# Patient Record
Sex: FEMALE | Race: WHITE | NOT HISPANIC OR LATINO | Employment: FULL TIME | URBAN - METROPOLITAN AREA
[De-identification: names, ages, dates, MRNs, and addresses within clinical notes are randomized per-mention and may not be internally consistent; named-entity substitution may affect disease eponyms.]

---

## 2018-09-11 ENCOUNTER — RECORDS - HEALTHEAST (OUTPATIENT)
Dept: LAB | Facility: CLINIC | Age: 48
End: 2018-09-11

## 2018-09-11 LAB
T4 FREE SERPL-MCNC: 0.6 NG/DL (ref 0.7–1.8)
TSH SERPL DL<=0.005 MIU/L-ACNC: 5.88 UIU/ML (ref 0.3–5)

## 2019-07-23 ENCOUNTER — OFFICE VISIT (OUTPATIENT)
Dept: HEMATOLOGY | Facility: CLINIC | Age: 49
End: 2019-07-23
Attending: INTERNAL MEDICINE
Payer: COMMERCIAL

## 2019-07-23 VITALS
HEART RATE: 89 BPM | OXYGEN SATURATION: 98 % | BODY MASS INDEX: 43.19 KG/M2 | RESPIRATION RATE: 12 BRPM | TEMPERATURE: 98.2 F | SYSTOLIC BLOOD PRESSURE: 136 MMHG | WEIGHT: 253 LBS | DIASTOLIC BLOOD PRESSURE: 80 MMHG | HEIGHT: 64 IN

## 2019-07-23 DIAGNOSIS — Z86.718 PERSONAL HISTORY OF VENOUS THROMBOSIS AND EMBOLISM: Primary | ICD-10-CM

## 2019-07-23 DIAGNOSIS — Z79.01 LONG TERM CURRENT USE OF ANTICOAGULANT THERAPY: ICD-10-CM

## 2019-07-23 DIAGNOSIS — D68.51 HETEROZYGOUS FACTOR V LEIDEN MUTATION (H): ICD-10-CM

## 2019-07-23 PROCEDURE — 99204 OFFICE O/P NEW MOD 45 MIN: CPT | Performed by: INTERNAL MEDICINE

## 2019-07-23 PROCEDURE — G0463 HOSPITAL OUTPT CLINIC VISIT: HCPCS

## 2019-07-23 RX ORDER — ALBUTEROL SULFATE 90 UG/1
2 AEROSOL, METERED RESPIRATORY (INHALATION) EVERY 4 HOURS PRN
COMMUNITY
Start: 2019-05-09

## 2019-07-23 RX ORDER — ERGOCALCIFEROL 1.25 MG/1
1 CAPSULE, LIQUID FILLED ORAL
Refills: 1 | COMMUNITY
Start: 2019-07-01

## 2019-07-23 RX ORDER — LAMOTRIGINE 200 MG/1
200 TABLET ORAL DAILY
Refills: 0 | COMMUNITY
Start: 2019-07-01 | End: 2023-01-25

## 2019-07-23 ASSESSMENT — PAIN SCALES - GENERAL: PAINLEVEL: NO PAIN (0)

## 2019-07-23 ASSESSMENT — MIFFLIN-ST. JEOR: SCORE: 1762.84

## 2019-07-23 NOTE — NURSING NOTE
Anna Nieves is here for return visit (last seen in 2014) and is  being seen for her history of clotting.  She had her initial clotting in 1994 and was on warfarin.  In 2014 when off warfarin for a week, she had unprovoked clotting and has been on warfarin since.  She is fine with this drug, but her primary is urging her to come in to discuss DOACs. She also shared that her father has been ill with a blood problem and brought his records to see if this has an impact on her care.    I welcomed and introduced her to our center and provided her with a contact card containing our information.    We reviewed the above history and reason for visit and this was communicated to the provider.  I then reviewed which provider she was going to be seeing today and the expected timing of that provider.    Medications and allergies were reviewed, updated and reconciled with available records.    Educated patient about the signs, symptoms of and risk factors for venous thrombosis (VTE) and provided an educational  book pedro, which reviews these facts. And includes the following web links  for further information:  www.stoptheclot.org, www.clotconnect.org.    I did provide patient with a flyer about a thrombosis educational event, happening on 10/12/2019 and sponsored by our center. The flyer includes a phone number and email for further information or to register their interest.    I  thanked her for coming and encouraged her to call with any concerns or questions.    Marie Barbour, RN - Nurse Clinician - [facility] - [phone]

## 2019-07-23 NOTE — PROGRESS NOTES
[facility]  [address] [place] [address] [address], [place] [address]  Main: [phone], Fax: [phone]        NAME: Anna Nieves    MRN: [id number]    Date of Visit:  07/23/2019             Chief complaint:   Possible switch to DOAC         History of Present Illness:   Anna Nieves is a 49 year old female with history of unprovoked DVT and PE in 2/2014 and heterozygous for factor V Leiden on chronic warfarin therapy who presents today for discussion regarding consideration of switching to a direct oral anticoagulant.     Please refer to the clinic note on 4/8/2014 by Dr. Lee for further details. In summary, the patient had her first thrombotic event in the superior sagittal sinus in 1994 after taking oral estrogen for less than 6 months.  It was felt to be provoked by oral estrogen therapy thus recommended short-term anticoagulation but patient elected to stay with long-term warfarin.  In terms of hypercoagulation work-up, there was a transient lupus anticoagulant that was not demonstrated on repeat testing.  She was found to be heterozygous for factor V Leiden.  When she had been off of her warfarin for a week due to running out of medication, she developed unprovoked left upper and lower extremity DVT with bilateral PE in 2/2014.  Since then she has been on warfarin.  She was seen last in the clinic at that time discussed other oral anticoagulation options particularly warfarin versus rivaroxaban, she decided to stay on warfarin.     She states that she has not had any recurrence of blood clots or bleeding complications since the last episode in 2014.  Although her INRs are not available to us, she reports that her INR has been very stable.  However, occasionally she did not show up for INR check-up with her PCP since her INR has been stable all the time.  Her primary care physician has thus advised her to discuss newer anticoagulation medications with   Jesus.      Last time she was also recommended to have a repeat venous ultrasound 3 months after the last event, which apparently did not happen.  Her father recently developed refractory anemia and is undergoing evaluations including bone marrow biopsy.  She was told it might be either multiple myeloma or NHL, and wonders whether there is any ties between her inherited coagulation tendency and these hematological conditions.         Review of Systems:   ROS: Comprehensive 10 point ROS negative except for that detailed above.         Past Medical History:   No past medical history on file.           Past Surgical History:   No past surgical history on file.         Social History:     Social History     Socioeconomic History     Marital status:      Spouse name: Not on file     Number of children: Not on file     Years of education: Not on file     Highest education level: Not on file   Occupational History     Not on file   Social Needs     Financial resource strain: Not on file     Food insecurity:     Worry: Not on file     Inability: Not on file     Transportation needs:     Medical: Not on file     Non-medical: Not on file   Tobacco Use     Smoking status: Never Smoker   Substance and Sexual Activity     Alcohol use: No     Drug use: Not on file     Sexual activity: Not on file   Lifestyle     Physical activity:     Days per week: Not on file     Minutes per session: Not on file     Stress: Not on file   Relationships     Social connections:     Talks on phone: Not on file     Gets together: Not on file     Attends Scientologist service: Not on file     Active member of club or organization: Not on file     Attends meetings of clubs or organizations: Not on file     Relationship status: Not on file     Intimate partner violence:     Fear of current or ex partner: Not on file     Emotionally abused: Not on file     Physically abused: Not on file     Forced sexual activity: Not on file   Other Topics Concern  "    Parent/sibling w/ CABG, MI or angioplasty before 65F 55M? Not Asked   Social History Narrative     Not on file            Family History:   No family history on file.     Please see Dr. Lee's note from 2014 for details.         Allergies:     Allergies   Allergen Reactions     Sulfa Drugs Hives            Home Medications:     Current Outpatient Medications   Medication     acetaZOLAMIDE (DIAMOX) 250 MG tablet     amphetamine-dextroamphetamine (ADDERALL) 20 MG tablet     cetirizine (ZYRTEC ALLERGY) 10 MG tablet     sertraline (ZOLOFT) 100 MG tablet     thyroid (ARMOUR THYROID) 120 MG tablet     warfarin (COUMADIN) 3 MG tablet     No current facility-administered medications for this visit.             Physical Exam:   /80 (BP Location: Right arm, Patient Position: Sitting, Cuff Size: Adult Large)   Pulse 89   Temp 98.2  F (36.8  C) (Oral)   Resp 12   Ht 1.618 m (5' 3.7\")   Wt 114.8 kg (253 lb)   SpO2 98%   BMI 43.84 kg/m       General:  no acute distress.  Heme/Lymph: No overt bleeding  Skin: No concerning lesions or rash on exposed surfaces.  HEENT: NCAT. anicteric sclera. Oral mucosa pink and moist with no lesions or thrush.  Neck: Neck supple.   Respiratory: Non-labored breathing, good air exchange, lungs clear to auscultation bilaterally.  Cardiovascular: Regular rate and rhythm. No murmur or rub.   Abdomen: Normoactive bowel sounds. Abdomen soft, non-distended, and non-tender. Extremities: grossly normal, non-tender, no edema.   Neurologic: A&O x 3, CNs 2-12 grossly intact, speech normal. Grossly non-focal.   Psychiatric: Mentation and affect appear normal.           Assessment :   1- History of provoked (by oral estrogen therapy for less than 6 months) superior sagittal sinus thrombosis in 1994.   2- Heterozygous for factor V Leiden with family history of venous thrombosis.   3- History of unprovoked left upper and lower extremity deep vein thrombosis with bilateral pulmonary embolism in " 2014  4- Long term anticoagulation with warfarin      The patient has been on long-term anticoagulation with warfarin since 1994 and complicated by unprovoked DVT/PE when warfarin was discontinued for about a week in 2014.  Last time she discussed pros and cons of different anticoagulation options, particularly warfarin versus rivaroxaban.  At that time, she elected to stay with warfarin.     We again discussed the advantages and disadvantages of warfarin versus direct oral anticoagulants.  After consideration, she decided to stay with warfarin.  This is due primarily to her concern about the impact of occasionally missing doses.    We will recommend her to have another ultrasound at the facility where she had previous venous doppler.  The reason to have another doppler is to set a baseline to be compared in case she develops symptoms concerning for DVT in the future.  Approximately 1 out of 3 patients could have chronic non-occlusive thrombosis after initial DVT.    In terms of genetic test for inherited hypercoagulability, there are no new genetic tests available since her last visit. Her father's diagnosis is pending, but none of her father's potential diagnoses likely requires her to have additional genetic tests. Since her family history is strongly positive for DVT we encourage her to update us if there are any family members newly affected by DVT.           Plan:   - Continue warfarin   - Repeat venous doppler in the left lower extremity    Patient was seen, care plan discussed and staffed with Dr. Cortez.     Se lisa Chun MD  [facility]  Hematology oncology and transplantation fellow  Pager [phone]        HEMATOLOGY STAFF:  Seen with fellow, whose note reflects our joint evaluation, assessment, and plan.    Anna is a 49-year-old woman whom we have seen intermittently over many years.  As outlined in my last note from April 2014, she has a history of estrogen provoked superior  sagittal sinus thrombosis in 1994.  Subsequent hypercoagulable testing revealed only heterozygous factor V Leiden.  She has a strong family history of venous thrombosis suggesting the presence of something more than factor V Leiden although no other thrombophilia has been identified.  She elected to stay on long-term anticoagulation even though her initial clotting event was provoked.    In early 2014, after being off warfarin for approximately 1 week, she had an unprovoked episode of venous thromboembolism including left upper and lower extremity DVTs as well as pulmonary emboli.  Thus, she now has a clear indication for long-term anticoagulation.    She is here today to discuss whether she should continue with warfarin or switch to a direct oral anticoagulant.  We reviewed the advantages and disadvantages of both classes of medications.  Her primary concern about the direct oral anticoagulants is the importance of maintaining strict adherence to the every 12 or 24-hour dosing schedules.  For this reason, she feels that warfarin would be a better fit for her and we support this decision.      Would like to see her back annually to review her long-term anticoagulation plan.  She was given our up-to-date contact information and encouraged to call with any new questions or concerns.    Total time 45 minutes, all in counseling and coordination of care.      Pedro Lee MD  Associate Professor of Medicine  Division of Hematology, Oncology, and Transplantation  Director, [facility]

## 2020-11-29 ENCOUNTER — HEALTH MAINTENANCE LETTER (OUTPATIENT)
Age: 50
End: 2020-11-29

## 2021-02-04 ENCOUNTER — RECORDS - HEALTHEAST (OUTPATIENT)
Dept: LAB | Facility: CLINIC | Age: 51
End: 2021-02-04

## 2021-02-04 LAB
T4 FREE SERPL-MCNC: 0.5 NG/DL (ref 0.7–1.8)
TSH SERPL DL<=0.005 MIU/L-ACNC: 102.01 UIU/ML (ref 0.3–5)

## 2021-02-14 ENCOUNTER — HEALTH MAINTENANCE LETTER (OUTPATIENT)
Age: 51
End: 2021-02-14

## 2021-05-24 ENCOUNTER — RECORDS - HEALTHEAST (OUTPATIENT)
Dept: ADMINISTRATIVE | Facility: CLINIC | Age: 51
End: 2021-05-24

## 2021-05-29 ENCOUNTER — RECORDS - HEALTHEAST (OUTPATIENT)
Dept: ADMINISTRATIVE | Facility: CLINIC | Age: 51
End: 2021-05-29

## 2021-07-19 ENCOUNTER — TRANSFERRED RECORDS (OUTPATIENT)
Dept: HEALTH INFORMATION MANAGEMENT | Facility: CLINIC | Age: 51
End: 2021-07-19

## 2021-08-11 ENCOUNTER — OFFICE VISIT (OUTPATIENT)
Dept: NEUROLOGY | Facility: CLINIC | Age: 51
End: 2021-08-11
Payer: COMMERCIAL

## 2021-08-11 VITALS
DIASTOLIC BLOOD PRESSURE: 95 MMHG | HEART RATE: 68 BPM | WEIGHT: 255 LBS | SYSTOLIC BLOOD PRESSURE: 157 MMHG | BODY MASS INDEX: 43.54 KG/M2 | HEIGHT: 64 IN

## 2021-08-11 DIAGNOSIS — D68.51 FACTOR 5 LEIDEN MUTATION, HETEROZYGOUS (H): ICD-10-CM

## 2021-08-11 DIAGNOSIS — G93.2 PSEUDOTUMOR CEREBRI SYNDROME: ICD-10-CM

## 2021-08-11 PROBLEM — I74.9 THROMBOEMBOLIC DISORDER (H): Status: ACTIVE | Noted: 2021-08-11

## 2021-08-11 PROCEDURE — 99214 OFFICE O/P EST MOD 30 MIN: CPT | Performed by: PSYCHIATRY & NEUROLOGY

## 2021-08-11 RX ORDER — ACETAZOLAMIDE 250 MG/1
250 TABLET ORAL 3 TIMES DAILY
Qty: 90 TABLET | Refills: 11 | Status: SHIPPED | OUTPATIENT
Start: 2021-08-11 | End: 2022-09-26

## 2021-08-11 ASSESSMENT — MIFFLIN-ST. JEOR: SCORE: 1761.67

## 2021-08-11 NOTE — NURSING NOTE
Chief Complaint   Patient presents with     Headache     Pt states headaches are about the same. Most days she does well.     Nisreen Horner LPN on 8/11/2021 at 11:39 AM

## 2021-08-11 NOTE — LETTER
8/11/2021         RE: Anna Nieves  [address] [facility] [id number]        Dear Colleague,    Thank you for referring your patient, Anna Nieves, to the [facility] [place]. Please see a copy of my visit note below.    In person evaluation    HPI  2/26/2020, in person visit  8/11/2021, in person visit    51-year-old followed neurologically for:  Sagittal sinus venous thrombosis 9045  Heterozygous factor V gene mutation  Obstructive sleep apnea  Pseudotumor cerebri with increased ICP and visual changes in the past      Since last seen a year and a half ago  No hospitalizations  No surgeries  No major illnesses  No Covid illness  Is in the midst of getting the 2 part Covid vaccine  Has had some flareup of her asthma usually triggered by cats alone she does not have a cat    Has had very dry eyes and her contacts are irritating her she is seeing the optometrist in that regard  Has followed also with Dr. Julio C Hernandez of ophthalmology    Fluctuating headaches more of an annoying frontal headache  No severe obscurations or visual changes    Pseudotumor seems stable  Currently using Diamox 250 mg tablet 2 at nighttime                She has the history of:    1. Sagittal venous thrombus in 1994.  2. Heterozygous factor V gene mutation.  3. History of obstructive sleep apnea, uses the CPAP machine. Working with a new sleep person is using the CPAP machine currently might have some narcoleptic type symptomatology  4. Intermittent headaches which seem well controlled and responsive to Diamox, using a low dose of 250 mg, one to two tablets per day. Increasing dose today up to 4 times per day  5. Has had some mild cataracts in the past, followed by  ophthalmology.  6. Pre-existing ADD in the past    Past history, the patient had blood clots also back in 2014 during the early part of the year, and was seen at the [facility]. These occurred in the left arm, left leg, right  arm clots, and multiple pulmonary emboli and she was placed back on her Coumadin by her primary.      Past medical history  Heterozygous factor V gene mutation  Sagittal sinus venous thrombosis 1994  Blood clots in 2014  Cataracts  ADD  Depression  Obstructive sleep apnea    She switched from Zoloft to Wellbutrin for depression treatment.      Habits  Non-smoker  Rare alcoholic beverage  She has adopted children with special needs.        Family history  Paternal grandfather with stroke  Father with hypertension and high cholesterol and abdominal aneurysm  Mother with epilepsy, migraines, Alzheimer's disease and arthritis  Father had blood clots  Brother had blood clots  Sister has blood clots and does not have a factor V gene problem        Work-up  History of heterozygous factor V Leiden  7 sinus venous thrombosis 1994  MRI scan brain/MRV head December 2005  A.  Negative MRI scan of the head  B.  Improvement in the superior sagittal sinus thrombosis partially recannulized compared to 2000  EEG December 2007, mild right theta disorganization more so after hyperventilation bursts of theta no epileptiform activity  [facility] August 2009 optic fundi look good  Stop anticoagulation February 2014 developed clot in the left arm left leg and right arm and multiple pulmonary emboli (restarted anticoagulation)      Laboratory review                      2/2014     1/2015   12/2017    10/2018   2/13/2021  Na/K          138/2.9     140/2.8   141/3.8     140/2.8    139/3.9    Cl/CO2       111/22     112/19    112/20      109/23     111/19    Glu                                                                               93    WBC/Hgb                                                                   9.8/14.3    Plts                                                                            289,000       INR                                                                            1.7              Review of  systems  Headaches but no significant visual changes  Blind spots seem to be stable  No obscurations    No diplopia dysarthria dysphagia  No chest pain    Has had some flareup of her asthma stable today    Dry eyes due to her contacts following with optometrist    Otherwise exam stable    We discussed clotting disorder at length  Has follow with the hematologist  Not a good candidate for the newer agents as they have shorter half-life and she have to be spot on with timing  Family history is positive for clots and multiple family members some without the factor V problem      Exam  Blood pressure 157/95 pulse 68 temperature 98.2  HEENT normal  Lungs clear  Heart rate regular  Abdomen soft  Symmetrical pulses  No edema in the feet    Neurologic exam  Alert oriented x3  No aphasia  No neglect  Normal memory recall    Cranial nerves II through XII significant for  Enlarged blind spot bilaterally using confrontation to measure mild increase  Optic fundi appear relatively normal    No ophthalmoplegia no nystagmus tongue twisters good face symmetrical  No actual visual field cut    Upper extremities  No drift no tremor normal finger-nose    Lower extremity strength normal    Reflexes symmetrical toes downgoing    Gait normal      Assessment/Plan     1.  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 Mcleod/ ophthalmologist following and tracking visual fields for good visual field to see if there is increased papilledema      2.  Factor V Leiden (D68.51)       Continue blood thinner warfarin INRs checked through primary       1994 maulik sinus thrombosis intracranially       2014 multifocal peripheral venous thrombosis and bilateral pulmonary emboli restarted warfarin    3.  RENETTA (Obstructive Sleep Apnea) (G47.33) Is wearing the CPAP mask at this time    She knows the risks and benefits of Diamox and the need for using this.   She stays well hydrated.    She knows about kidney stones, and this was re-discussed today also.   It can also cause electrolyte abnormalities, but she is on  low dose at this time.    Current plan:    1. Diamox 250 mg tablet up to 3 tablets/day  2. Should follow up yearly basis sooner if there is problems  3. See ophthalmologist for formal visual fields  4. Anticoagulation is controlled by her primary for her difficulty with clots, pulmonary emboli, and her clotting disorder as above.  5. Follow-up with a sleep specialist to see if she has narcoleptic tendencies or not she has some ADD the Adderall probably helps with that with concentration I do not feel that she has a progressive memory problem but if she is getting poor sleep she had ADD concentration may be difficult we had a long discussion about that she probably did not have any significant brain damage from her past clots but needs to treat her symptoms when they arise          As part of this visit today  Reviewed ER notes 2/13/2021  Left lower extremity Doppler 2/13/2021 no clot  Labs from February 2021    Total care time today 34 minutes      Again, thank you for allowing me to participate in the care of your patient.        Sincerely,        Sharath Riggs MD

## 2021-08-11 NOTE — PROGRESS NOTES
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 Julio C Hernandez of ophthalmology    Fluctuating headaches more of an annoying frontal headache  No severe obscurations or visual changes    Pseudotumor seems stable  Currently using Diamox 250 mg tablet 2 at nighttime                She has the history of:    1. Sagittal venous thrombus in 1994.  2. Heterozygous factor V gene mutation.  3. History of obstructive sleep apnea, uses the CPAP machine. Working with a new sleep person is using the CPAP machine currently might have some narcoleptic type symptomatology  4. Intermittent headaches which seem well controlled and responsive to Diamox, using a low dose of 250 mg, one to two tablets per day. Increasing dose today up to 4 times per day  5. Has had some mild cataracts in the past, followed by  ophthalmology.  6. Pre-existing ADD in the past    Past history, the patient had blood clots also back in 2014 during the early part of the year, and was seen at the [facility]. These occurred in the left arm, left leg, right arm clots, and multiple pulmonary emboli and she was placed back on her Coumadin by her primary.      Past medical history  Heterozygous factor V gene mutation  Sagittal sinus venous thrombosis 1994  Blood clots in 2014  Cataracts  ADD  Depression  Obstructive sleep  apnea    She switched from Zoloft to Wellbutrin for depression treatment.      Habits  Non-smoker  Rare alcoholic beverage  She has adopted children with special needs.        Family history  Paternal grandfather with stroke  Father with hypertension and high cholesterol and abdominal aneurysm  Mother with epilepsy, migraines, Alzheimer's disease and arthritis  Father had blood clots  Brother had blood clots  Sister has blood clots and does not have a factor V gene problem        Work-up  History of heterozygous factor V Leiden  7 sinus venous thrombosis 1994  MRI scan brain/MRV head December 2005  A.  Negative MRI scan of the head  B.  Improvement in the superior sagittal sinus thrombosis partially recannulized compared to 2000  EEG December 2007, mild right theta disorganization more so after hyperventilation bursts of theta no epileptiform activity  [facility] August 2009 optic fundi look good  Stop anticoagulation February 2014 developed clot in the left arm left leg and right arm and multiple pulmonary emboli (restarted anticoagulation)      Laboratory review                      2/2014     1/2015   12/2017    10/2018   2/13/2021  Na/K          138/2.9     140/2.8   141/3.8     140/2.8    139/3.9    Cl/CO2       111/22     112/19    112/20      109/23     111/19    Glu                                                                               93    WBC/Hgb                                                                   9.8/14.3    Plts                                                                            289,000       INR                                                                            1.7              Review of systems  Headaches but no significant visual changes  Blind spots seem to be stable  No obscurations    No diplopia dysarthria dysphagia  No chest pain    Has had some flareup of her asthma stable today    Dry eyes due to her contacts following with  optometrist    Otherwise exam stable    We discussed clotting disorder at length  Has follow with the hematologist  Not a good candidate for the newer agents as they have shorter half-life and she have to be spot on with timing  Family history is positive for clots and multiple family members some without the factor V problem      Exam  Blood pressure 157/95 pulse 68 temperature 98.2  HEENT normal  Lungs clear  Heart rate regular  Abdomen soft  Symmetrical pulses  No edema in the feet    Neurologic exam  Alert oriented x3  No aphasia  No neglect  Normal memory recall    Cranial nerves II through XII significant for  Enlarged blind spot bilaterally using confrontation to measure mild increase  Optic fundi appear relatively normal    No ophthalmoplegia no nystagmus tongue twisters good face symmetrical  No actual visual field cut    Upper extremities  No drift no tremor normal finger-nose    Lower extremity strength normal    Reflexes symmetrical toes downgoing    Gait normal      Assessment/Plan     1.  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 Mcleod/ ophthalmologist following and tracking visual fields for good visual field to see if there is increased papilledema      2.  Factor V Leiden (D68.51)       Continue blood thinner warfarin INRs checked through primary       1994 maulik sinus thrombosis intracranially       2014 multifocal peripheral venous thrombosis and bilateral pulmonary emboli restarted warfarin    3.  RENETTA (Obstructive Sleep Apnea) (G47.33) Is wearing the CPAP mask at this time    She knows the risks and benefits of Diamox and the need for using this.   She stays well hydrated.   She knows about kidney stones, and this was re-discussed today also.   It can also cause electrolyte abnormalities, but she is on  low dose at this time.    Current plan:    1. Diamox 250 mg tablet up to 3 tablets/day  2. Should follow up yearly basis  sooner if there is problems  3. See ophthalmologist for formal visual fields  4. Anticoagulation is controlled by her primary for her difficulty with clots, pulmonary emboli, and her clotting disorder as above.  5. Follow-up with a sleep specialist to see if she has narcoleptic tendencies or not she has some ADD the Adderall probably helps with that with concentration I do not feel that she has a progressive memory problem but if she is getting poor sleep she had ADD concentration may be difficult we had a long discussion about that she probably did not have any significant brain damage from her past clots but needs to treat her symptoms when they arise          As part of this visit today  Reviewed ER notes 2/13/2021  Left lower extremity Doppler 2/13/2021 no clot  Labs from February 2021    Total care time today 34 minutes

## 2021-09-25 ENCOUNTER — HEALTH MAINTENANCE LETTER (OUTPATIENT)
Age: 51
End: 2021-09-25

## 2022-01-09 ENCOUNTER — HEALTH MAINTENANCE LETTER (OUTPATIENT)
Age: 52
End: 2022-01-09

## 2022-02-13 ENCOUNTER — APPOINTMENT (OUTPATIENT)
Dept: ULTRASOUND IMAGING | Facility: HOSPITAL | Age: 52
End: 2022-02-13
Attending: EMERGENCY MEDICINE
Payer: COMMERCIAL

## 2022-02-13 ENCOUNTER — HOSPITAL ENCOUNTER (EMERGENCY)
Facility: HOSPITAL | Age: 52
Discharge: HOME | End: 2022-02-13
Attending: EMERGENCY MEDICINE | Admitting: EMERGENCY MEDICINE
Payer: COMMERCIAL

## 2022-02-13 VITALS
WEIGHT: 255 LBS | OXYGEN SATURATION: 97 % | HEART RATE: 106 BPM | DIASTOLIC BLOOD PRESSURE: 73 MMHG | TEMPERATURE: 97.5 F | RESPIRATION RATE: 16 BRPM | SYSTOLIC BLOOD PRESSURE: 134 MMHG | BODY MASS INDEX: 43.77 KG/M2

## 2022-02-13 DIAGNOSIS — M79.662 PAIN OF LEFT LOWER LEG: ICD-10-CM

## 2022-02-13 LAB — INR PPP: 2.65 (ref 0.9–1.15)

## 2022-02-13 PROCEDURE — 99284 EMERGENCY DEPT VISIT MOD MDM: CPT | Mod: 25

## 2022-02-13 PROCEDURE — 36415 COLL VENOUS BLD VENIPUNCTURE: CPT | Performed by: EMERGENCY MEDICINE

## 2022-02-13 PROCEDURE — 85610 PROTHROMBIN TIME: CPT | Performed by: EMERGENCY MEDICINE

## 2022-02-13 PROCEDURE — 93971 EXTREMITY STUDY: CPT | Mod: LT

## 2022-02-13 NOTE — ED PROVIDER NOTES
EMERGENCY DEPARTMENT ENCOUNTER      NAME: Anna Nieves  AGE: 51 year old female  YOB: 1970  MRN: [id number]  EVALUATION DATE & TIME: 2/13/2022  4:01 PM    PCP: Itzel Young    ED PROVIDER: Sade Parra M.D.      Chief Complaint   Patient presents with     left leg pain     FINAL IMPRESSION:  1. Pain of left lower leg      ED COURSE & MEDICAL DECISION MAKING:    Pertinent Labs & Imaging studies reviewed. (See chart for details)  ED Course as of 02/13/22 1728   Sun Feb 13, 2022   1609 I performed my initial history and physical exam as well as discussed ED course and plan.         1615 Patient is a pleasant 51-year-old female who comes in today with left lower extremity pain.  She noticed some pain and swelling of her left calf.  She has a history of factor V Leiden deficiency and is on Coumadin but has missed some doses in the last week.  She is not having any chest pain or trouble breathing.  She has had pain in the leg before and is that it ultrasounded at times in the past and its been negative but she was worried about it 1 to come in and get checked out.  She does have some mild tenderness and a little bit of swelling.  We will get an ultrasound of the leg and we will check her INR today.  She did not intentionally miss any Coumadin doses but sometimes she gets tired and falls asleep before she takes her doses and misses them.  I discussed the plan with her and she is in agreement.  Her physical exam was otherwise fairly unremarkable.   1709 No DVT was seen.  We are still waiting for the patient's INR.   1724 I discussed the ultrasound results with the patient.  We will wait for her INR and then work on getting her discharged home.  Her initial blood pressure was elevated so she wanted us to recheck it which I think is reasonable.  We will get a recheck right now and then we can hopefully get her discharged.   1726 INR came back at 2.65.  I think patient can continue her normal doses.    1728 I discussed the plan with the patient.  She will be dismissed in stable condition.       At the conclusion of the encounter I discussed  the results of all of the tests and the disposition with patient.   All questions were answered.  The patient acknowledged understanding and was involved in the decision making regarding the overall care plan.      I discussed with patient the utility, limitations and findings of the exam/interventions/studies done during this visit as well as the list of differential diagnosis and symptoms to monitor/return to ER for.  Additional verbal discharge instructions were provided.     MEDICATIONS GIVEN IN THE EMERGENCY:  Medications - No data to display    NEW PRESCRIPTIONS STARTED AT TODAY'S ER VISIT  New Prescriptions    No medications on file          =================================================================    HPI    Triage Note: Pt presents with left lower leg in back of leg and calf area with some swelling.  Pt states that she has factor Leiden 5 and has has a blood clot in this leg previously      Patient information was obtained from: Patient    Use of : N/A        Anna Nieves is a 51 year old female who presents with a pertinent medical history of factor V deficiency, pulmonary embolism, deep vein thrombosis, and long term use of anticoagulation via private car for evaluation of left sided calf pain.     Patient reports having a complex history of complications due to her factor V deficiency, noting that she was diagnosed 23 years old and had a stroke at this time too. She remarks that 20 years later after initial diagnosis she developed a pulmonary embolism and clots in her right arm and left leg. Patient has noticed onset of some left posterior leg pain and calf tenderness with mild swelling. She adds that she gets this sensation in her right leg all the time and it always results as normal following workup. Patient would just like to be sure  "she does not have a clot on the off chance she might have developed another one. She is currently taking Warfarin but says she has not been super consistent over the past few weeks. Patient's last time missing a dose of her blood thinner was earlier this week. Her last INR was \"A while ago\". No other medical problems. Denies chest pain, shortness of breath, or any other symptoms at this time.     REVIEW OF SYSTEMS   Except as stated in the HPI all other systems reviewed and are negative.    PAST MEDICAL HISTORY:  Past Medical History:   Diagnosis Date     Deep vein thrombosis (H)      Factor V deficiency (H)      Parkinson's disease (H)      Pulmonary embolism (H)        PAST SURGICAL HISTORY:  No past surgical history on file.    CURRENT MEDICATIONS:    No current facility-administered medications for this encounter.    Current Outpatient Medications:      acetaZOLAMIDE (DIAMOX) 250 MG tablet, Take 1 tablet (250 mg) by mouth 3 times daily, Disp: 90 tablet, Rfl: 11     albuterol (VENTOLIN HFA) 108 (90 Base) MCG/ACT inhaler, Inhale 2 puffs into the lungs as needed, Disp: , Rfl:      amphetamine-dextroamphetamine (ADDERALL) 20 MG tablet, Take 20 mg by mouth daily  (Patient not taking: Reported on 8/11/2021), Disp: , Rfl:      cetirizine (ZYRTEC ALLERGY) 10 MG tablet, Take 10 mg by mouth daily, Disp: , Rfl:      lamoTRIgine (LAMICTAL) 200 MG tablet, Take 200 mg by mouth daily (Patient not taking: Reported on 8/11/2021), Disp: , Rfl: 0     omeprazole (PRILOSEC) 20 MG DR capsule, TAKE 1 CAPSULE BY MOUTH EVERY DAY, Disp: , Rfl:      thyroid (ARMOUR THYROID) 120 MG tablet, Take 120 mg by mouth daily, Disp: , Rfl:      vitamin D2 (ERGOCALCIFEROL) 46812 units (1250 mcg) capsule, Take 1 capsule by mouth every 7 days, Disp: , Rfl: 1     warfarin (COUMADIN) 3 MG tablet, Take 4 mg by mouth daily , Disp: , Rfl:     ALLERGIES:  Allergies   Allergen Reactions     Cats Difficulty breathing     Dust Mites Other (See Comments)     " Nasal dripping     Pollen Extract Other (See Comments)     Nasal congestion       Sulfa Drugs Hives       FAMILY HISTORY:  Family History   Problem Relation Age of Onset     Hypertension Father      Lymphoma Father      Peripheral Vascular Disease Father        SOCIAL HISTORY:   Social History     Socioeconomic History     Marital status:      Spouse name: Not on file     Number of children: Not on file     Years of education: Not on file     Highest education level: Not on file   Occupational History     Not on file   Tobacco Use     Smoking status: Never Smoker     Smokeless tobacco: Never Used   Substance and Sexual Activity     Alcohol use: Yes     Comment: 1-2 times per year     Drug use: Not on file     Sexual activity: Not on file   Other Topics Concern     Parent/sibling w/ CABG, MI or angioplasty before 65F 55M? Not Asked   Social History Narrative     Not on file     Social Determinants of Health     Financial Resource Strain: Not on file   Food Insecurity: Not on file   Transportation Needs: Not on file   Physical Activity: Not on file   Stress: Not on file   Social Connections: Not on file   Intimate Partner Violence: Not on file   Housing Stability: Not on file       PHYSICAL EXAM    VITAL SIGNS: BP (!) 181/98   Pulse 106   Temp 97.5  F (36.4  C) (Temporal)   Resp 16   Wt 115.7 kg (255 lb)   SpO2 97%   BMI 43.77 kg/m     GENERAL: Awake, Alert, answering questions, No acute distress, Well nourished  HEENT: Normal cephalic, Atraumatic, bilateral external ears normal, No scleral icterus, mask in place  NECK: No obvious swelling or abnormality, No stridor  PULMONARY:Normal and symmetric breath sounds, No respiratory distress, Lungs clear to auscultation bilaterally. No wheezing  CARDIOVASCULAR: Regular rate and rhythm, Distal pulses present and normal.  EXTREMITIES: Moves all extremities spontaneously, warm, No major deformities. Mild left sided calf tenderness  NEURO: No facial droop, normal  motor function, Normal speech   PSYCH: Normal mood and affect  SKIN: No rashes on visualized skin, dry, warm     LAB:  All pertinent labs reviewed and interpreted.  Results for orders placed or performed during the hospital encounter of 02/13/22   US Lower Extremity Venous Duplex Left    Impression    IMPRESSION:  1.  No deep venous thrombosis in the left lower extremity.   Result Value Ref Range    INR 2.65 (H) 0.90 - 1.15       RADIOLOGY:  US Lower Extremity Venous Duplex Left   Final Result   IMPRESSION:   1.  No deep venous thrombosis in the left lower extremity.            I, Scar Mon, am serving as a scribe to document services personally performed by Dr. Parra based on my observation and the provider's statements to me. I, Sade Parra MD attest that Scar Mon is acting in a scribe capacity, has observed my performance of the services and has documented them in accordance with my direction.    Sade Parra M.D.  Emergency Medicine  [facility] EMERGENCY DEPARTMENT  [facility] [facility] [address]  [phone]  Dept: [phone]       Sade Parra MD  02/13/22 2442

## 2022-02-13 NOTE — ED TRIAGE NOTES
Pt presents with left lower leg in back of leg and calf area with some swelling.  Pt states that she has factor Leiden 5 and has has a blood clot in this leg previously

## 2022-02-13 NOTE — DISCHARGE INSTRUCTIONS
You were seen in the Emergency Department today for evaluation of lower leg pain.  Your lab work showed an INR of 2.65. Your imaging studies showed no DVT.  Follow up with your primary care physician to ensure resolution of symptoms. Return if you have new or worsening symptoms.

## 2022-02-16 ENCOUNTER — LAB REQUISITION (OUTPATIENT)
Dept: LAB | Facility: CLINIC | Age: 52
End: 2022-02-16

## 2022-02-16 DIAGNOSIS — E03.9 HYPOTHYROIDISM, UNSPECIFIED: ICD-10-CM

## 2022-02-16 LAB
T4 FREE SERPL-MCNC: 0.88 NG/DL (ref 0.7–1.8)
TSH SERPL DL<=0.005 MIU/L-ACNC: 0.07 UIU/ML (ref 0.3–5)

## 2022-02-16 PROCEDURE — 84443 ASSAY THYROID STIM HORMONE: CPT | Performed by: PHYSICIAN ASSISTANT

## 2022-02-16 PROCEDURE — 84439 ASSAY OF FREE THYROXINE: CPT | Performed by: PHYSICIAN ASSISTANT

## 2022-03-06 ENCOUNTER — HEALTH MAINTENANCE LETTER (OUTPATIENT)
Age: 52
End: 2022-03-06

## 2022-06-17 ENCOUNTER — LAB REQUISITION (OUTPATIENT)
Dept: LAB | Facility: CLINIC | Age: 52
End: 2022-06-17

## 2022-06-17 DIAGNOSIS — A68.9 RELAPSING FEVER, UNSPECIFIED: ICD-10-CM

## 2022-06-17 DIAGNOSIS — R53.83 OTHER FATIGUE: ICD-10-CM

## 2022-06-17 DIAGNOSIS — R68.2 DRY MOUTH, UNSPECIFIED: ICD-10-CM

## 2022-06-17 LAB
C REACTIVE PROTEIN LHE: 0.6 MG/DL (ref 0–0.8)
ERYTHROCYTE [DISTWIDTH] IN BLOOD BY AUTOMATED COUNT: 13.2 % (ref 10–15)
ERYTHROCYTE [SEDIMENTATION RATE] IN BLOOD BY WESTERGREN METHOD: 15 MM/HR (ref 0–20)
HCT VFR BLD AUTO: 43.3 % (ref 35–47)
HGB BLD-MCNC: 13.6 G/DL (ref 11.7–15.7)
MCH RBC QN AUTO: 27.6 PG (ref 26.5–33)
MCHC RBC AUTO-ENTMCNC: 31.4 G/DL (ref 31.5–36.5)
MCV RBC AUTO: 88 FL (ref 78–100)
PLATELET # BLD AUTO: 302 10E3/UL (ref 150–450)
RBC # BLD AUTO: 4.93 10E6/UL (ref 3.8–5.2)
TSH SERPL DL<=0.005 MIU/L-ACNC: 3.23 UIU/ML (ref 0.3–5)
WBC # BLD AUTO: 7.2 10E3/UL (ref 4–11)

## 2022-06-17 PROCEDURE — 85027 COMPLETE CBC AUTOMATED: CPT | Performed by: PHYSICIAN ASSISTANT

## 2022-06-17 PROCEDURE — 80053 COMPREHEN METABOLIC PANEL: CPT | Performed by: PHYSICIAN ASSISTANT

## 2022-06-17 PROCEDURE — 84443 ASSAY THYROID STIM HORMONE: CPT | Performed by: PHYSICIAN ASSISTANT

## 2022-06-17 PROCEDURE — 85652 RBC SED RATE AUTOMATED: CPT | Performed by: PHYSICIAN ASSISTANT

## 2022-06-17 PROCEDURE — 86140 C-REACTIVE PROTEIN: CPT | Performed by: PHYSICIAN ASSISTANT

## 2022-06-17 PROCEDURE — 82040 ASSAY OF SERUM ALBUMIN: CPT | Performed by: PHYSICIAN ASSISTANT

## 2022-06-17 PROCEDURE — 86038 ANTINUCLEAR ANTIBODIES: CPT | Performed by: PHYSICIAN ASSISTANT

## 2022-06-18 LAB
ALBUMIN SERPL-MCNC: 3.7 G/DL (ref 3.5–5)
ALP SERPL-CCNC: 95 U/L (ref 45–120)
ALT SERPL W P-5'-P-CCNC: 15 U/L (ref 0–45)
ANION GAP SERPL CALCULATED.3IONS-SCNC: 11 MMOL/L (ref 5–18)
AST SERPL W P-5'-P-CCNC: 18 U/L (ref 0–40)
BILIRUB SERPL-MCNC: 0.4 MG/DL (ref 0–1)
BUN SERPL-MCNC: 9 MG/DL (ref 8–22)
CALCIUM SERPL-MCNC: 8.6 MG/DL (ref 8.5–10.5)
CHLORIDE BLD-SCNC: 110 MMOL/L (ref 98–107)
CO2 SERPL-SCNC: 20 MMOL/L (ref 22–31)
CREAT SERPL-MCNC: 0.76 MG/DL (ref 0.6–1.1)
GFR SERPL CREATININE-BSD FRML MDRD: >90 ML/MIN/1.73M2
GLUCOSE BLD-MCNC: 91 MG/DL (ref 70–125)
POTASSIUM BLD-SCNC: 4.5 MMOL/L (ref 3.5–5)
PROT SERPL-MCNC: 6.4 G/DL (ref 6–8)
SODIUM SERPL-SCNC: 141 MMOL/L (ref 136–145)

## 2022-06-20 LAB
ANA PAT SER IF-IMP: ABNORMAL
ANA PAT SER IF-IMP: ABNORMAL
ANA SER QL IF: POSITIVE
ANA TITR SER IF: ABNORMAL {TITER}
ANA TITR SER IF: ABNORMAL {TITER}

## 2022-06-29 ENCOUNTER — MEDICAL CORRESPONDENCE (OUTPATIENT)
Dept: HEALTH INFORMATION MANAGEMENT | Facility: CLINIC | Age: 52
End: 2022-06-29

## 2022-09-26 ENCOUNTER — TELEPHONE (OUTPATIENT)
Dept: NEUROLOGY | Facility: CLINIC | Age: 52
End: 2022-09-26

## 2022-09-26 RX ORDER — ACETAZOLAMIDE 250 MG/1
250 TABLET ORAL 3 TIMES DAILY
Qty: 90 TABLET | Refills: 3 | Status: SHIPPED | OUTPATIENT
Start: 2022-09-26 | End: 2023-01-25

## 2022-09-26 NOTE — TELEPHONE ENCOUNTER
Jo-Ann sent a refill request for Acetazolamide 250 mg 1 tab tid # 90. I have lm for pt with a appt date for follow up visit.

## 2022-09-26 NOTE — TELEPHONE ENCOUNTER
Pending Prescriptions:                       Disp   Refills    acetaZOLAMIDE (DIAMOX) 250 MG tablet      90 tab*11           Sig: Take 1 tablet (250 mg) by mouth 3 times daily    LOV 8/11/21    NOV 1/25/23    Per LOV note: Current plan: 1. Diamox 250 mg tablet up to 3 tablets/day    Refills provided until f/u    Sloan Gunn RN, BSN  [facility]

## 2022-12-26 ENCOUNTER — HEALTH MAINTENANCE LETTER (OUTPATIENT)
Age: 52
End: 2022-12-26

## 2023-01-24 NOTE — PROGRESS NOTES
In person evaluation    HPI  2/26/2020, in person visit  8/11/2021, in person visit  1/25/2023, in person visit      52-year-old followed neurologically for:  Sagittal sinus venous thrombosis 9088  Heterozygous factor V gene mutation  Obstructive sleep apnea  Pseudotumor cerebri with increased ICP and visual changes in the past    Since last seen about a year and a half ago  No hospitalizations  No surgeries  No new major illnesses    Had COVID in the past some chronic fatigue from that  Past history of some flareup of asthma triggered by cats in the past not having asthma trouble now    No diplopia dysarthria dysphagia  No visual obscurations   No blurry vision    Last seen ophthalmology Dr. Julio C Hernandez may be year plus ago    Has headache that is mild    Frequency 3/week    Duration    4 hours    Tylenol and a sinus medicine 1.5 tabs per week    No sign of withdrawal headache    Does take an extra Diamox occasionally  Otherwise is on 2 tabs at night  Medications written as 250 mg Diamox 2 tabs 3 times daily    This way she has a rescue    Increased stressors with loss of loved ones in the last year and a half                    She has the history of:    1. Sagittal venous thrombus in 1994.  2. Heterozygous factor V gene mutation.  3. History of obstructive sleep apnea, uses the CPAP machine. Working with a new sleep person is using the CPAP machine currently might have some narcoleptic type symptomatology  4. Intermittent headaches which seem well controlled and responsive to Diamox, using a low dose of 250 mg, one to two tablets per day. Increasing dose today up to 4 times per day  5. Has had some mild cataracts in the past, followed by  ophthalmology.  6. Pre-existing ADD in the past    Past history, the patient had blood clots also back in 2014 during the early part of the year, and was seen at the [facility]. These occurred in the left arm, left leg, right arm clots, and multiple pulmonary emboli  and she was placed back on her Coumadin by her primary.      Past medical history  Heterozygous factor V gene mutation  Sagittal sinus venous thrombosis 1994  Blood clots in 2014  Cataracts  ADD  Depression  Obstructive sleep apnea    She switched from Zoloft to Wellbutrin for depression treatment.      Habits  Non-smoker  Rare alcoholic beverage  She has adopted children with special needs.        Family history  Paternal grandfather with stroke  Father with hypertension and high cholesterol and abdominal aneurysm  Mother with epilepsy, migraines, Alzheimer's disease and arthritis  Father had blood clots  Brother had blood clots  Sister has blood clots and does not have a factor V gene problem        Work-up  History of heterozygous factor V Leiden  7 sinus venous thrombosis 1994  MRI scan brain/MRV head December 2005  A.  Negative MRI scan of the head  B.  Improvement in the superior sagittal sinus thrombosis partially recannulized compared to 2000  EEG December 2007, mild right theta disorganization more so after hyperventilation bursts of theta no epileptiform activity  [place] [facility] August 2009 optic fundi look good  Stop anticoagulation February 2014 developed clot in the left arm left leg and right arm and multiple pulmonary emboli (restarted anticoagulation)      Laboratory review                      2/2014     1/2015   12/2017    10/2018   2/13/2021    6/17/2022  Na/K          138/2.9     140/2.8   141/3.8     140/2.8    139/3.9          141/4.5    Cl/CO2       111/22     112/19    112/20      109/23     111/19           110/20    Glu                                                                               93                  91    AST                                                                                                   18    WBC/Hgb                                                                   9.8/14.3          7.2/13.6    Plts                                                                             289,000           302,000       INR                                                                            1.7    TSH                                                                                                    3.23          Review of systems  Headaches but no significant visual changes see above  Blind spots seem to be stable  No obscurations    No diplopia dysarthria dysphagia  No chest pain    Has had some flareup of her asthma stable today    Dry eyes due to her contacts following with optometrist    Otherwise exam stable    Discussed past clotting disorder  Has followed with hematology  Is on warfarin    Family history is positive for clots in multiple family members son without factor V problem        Exam  Blood pressure 117/85, pulse 86  HEENT normal  Lungs clear  Heart rate regular  Abdomen soft  Symmetrical pulses  No edema in the feet    Neurologic exam  Alert oriented x3  No aphasia  No neglect  Normal memory recall    Cranial nerves II through XII significant for   blind spot is possibly minimally in creased subtle  Optic fundi appear relatively normal    No ophthalmoplegia no nystagmus tongue twisters good face symmetrical  No actual visual field cut    Upper extremities  No drift no tremor normal finger-nose    Lower extremity strength normal    Reflexes symmetrical toes downgoing    Gait normal      Assessment/Plan     1.  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 Mcleod/ ophthalmologist following and tracking visual fields for good visual field to see if there is increased papilledema      2.  Factor V Leiden (D68.51)       Continue blood thinner warfarin INRs checked through primary       1994 maulik sinus thrombosis intracranially       2014 multifocal peripheral venous thrombosis and bilateral pulmonary emboli restarted warfarin    3.  RENETTA (Obstructive Sleep Apnea) (G47.33) Is wearing  the CPAP mask at this time    She knows the risks and benefits of Diamox and the need for using this.   She stays well hydrated.   She knows about kidney stones, and this was re-discussed today also.   It can also cause electrolyte abnormalities, but she is on  low dose at this time.    Current plan:    1. Diamox 250 mg tablet up to 3 tablets/day, usually on 2 uses a third as a rescue  2. Should follow up yearly basis sooner if there is problems  3. See ophthalmologist for formal visual fields  4. Anticoagulation is controlled by her primary for her difficulty with clots, pulmonary emboli, and her clotting disorder as above.      In the past  5. Follow-up with a sleep specialist to see if she has narcoleptic tendencies or not she has some ADD the Adderall probably helps with that with concentration I do not feel that she has a progressive memory problem but if she is getting poor sleep she had ADD concentration may be difficult we had a long discussion about that she probably did not have any significant brain damage from her past clots but needs to treat her symptoms when they arise    Should follow-up with ophthalmology  Continue with Diamox  Follow-up in October 2023    Reviewed past history since the last year and a half  Multiple issues as above discussed    Total care time today 42 minutes      As per the visit today  Reviewed multiple laboratory data  Multiple issues clotting disorder/pseudotumor/headaches discussed

## 2023-01-25 ENCOUNTER — OFFICE VISIT (OUTPATIENT)
Dept: NEUROLOGY | Facility: CLINIC | Age: 53
End: 2023-01-25
Payer: COMMERCIAL

## 2023-01-25 VITALS
WEIGHT: 249 LBS | SYSTOLIC BLOOD PRESSURE: 117 MMHG | BODY MASS INDEX: 42.51 KG/M2 | HEART RATE: 86 BPM | HEIGHT: 64 IN | DIASTOLIC BLOOD PRESSURE: 85 MMHG

## 2023-01-25 DIAGNOSIS — D68.51 FACTOR 5 LEIDEN MUTATION, HETEROZYGOUS (H): ICD-10-CM

## 2023-01-25 DIAGNOSIS — G93.2 PSEUDOTUMOR CEREBRI SYNDROME: Primary | ICD-10-CM

## 2023-01-25 PROCEDURE — 99215 OFFICE O/P EST HI 40 MIN: CPT | Performed by: PSYCHIATRY & NEUROLOGY

## 2023-01-25 RX ORDER — ACETAZOLAMIDE 250 MG/1
250 TABLET ORAL 3 TIMES DAILY
Qty: 270 TABLET | Refills: 3 | Status: SHIPPED | OUTPATIENT
Start: 2023-01-25 | End: 2023-10-02

## 2023-01-25 NOTE — LETTER
1/25/2023         RE: Anna Nieves  [address] [facility] [address]        Dear Colleague,    Thank you for referring your patient, Anna Nieves, to the [facility] [place]. Please see a copy of my visit note below.    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 Julio C Hernandez may be year plus ago    Has headache that is mild    Frequency 3/week    Duration    4 hours    Tylenol and a sinus medicine 1.5 tabs per week    No sign of withdrawal headache    Does take an extra Diamox occasionally  Otherwise is on 2 tabs at night  Medications written as 250 mg Diamox 2 tabs 3 times daily    This way she has a rescue    Increased stressors with loss of loved ones in the last year and a half                    She has the history of:    1. Sagittal venous thrombus in 1994.  2. Heterozygous factor V gene mutation.  3. History of obstructive sleep apnea, uses the CPAP machine. Working with a new sleep person is using the CPAP machine currently might have some narcoleptic type symptomatology  4. Intermittent headaches which seem well controlled and responsive to Diamox, using a low dose of 250 mg, one to two tablets per day. Increasing dose today up to 4 times per day  5. Has had some mild cataracts in the past, followed  by  ophthalmology.  6. Pre-existing ADD in the past    Past history, the patient had blood clots also back in 2014 during the early part of the year, and was seen at the [facility]. These occurred in the left arm, left leg, right arm clots, and multiple pulmonary emboli and she was placed back on her Coumadin by her primary.      Past medical history  Heterozygous factor V gene mutation  Sagittal sinus venous thrombosis 1994  Blood clots in 2014  Cataracts  ADD  Depression  Obstructive sleep apnea    She switched from Zoloft to Wellbutrin for depression treatment.      Habits  Non-smoker  Rare alcoholic beverage  She has adopted children with special needs.        Family history  Paternal grandfather with stroke  Father with hypertension and high cholesterol and abdominal aneurysm  Mother with epilepsy, migraines, Alzheimer's disease and arthritis  Father had blood clots  Brother had blood clots  Sister has blood clots and does not have a factor V gene problem        Work-up  History of heterozygous factor V Leiden  7 sinus venous thrombosis 1994  MRI scan brain/MRV head December 2005  A.  Negative MRI scan of the head  B.  Improvement in the superior sagittal sinus thrombosis partially recannulized compared to 2000  EEG December 2007, mild right theta disorganization more so after hyperventilation bursts of theta no epileptiform activity  [place] [facility] August 2009 optic fundi look good  Stop anticoagulation February 2014 developed clot in the left arm left leg and right arm and multiple pulmonary emboli (restarted anticoagulation)      Laboratory review                      2/2014     1/2015   12/2017    10/2018   2/13/2021    6/17/2022  Na/K          138/2.9     140/2.8   141/3.8     140/2.8    139/3.9          141/4.5    Cl/CO2       111/22     112/19    112/20      109/23     111/19           110/20    Glu                                                                               93                   91    AST                                                                                                   18    WBC/Hgb                                                                   9.8/14.3          7.2/13.6    Plts                                                                            289,000           302,000       INR                                                                            1.7    TSH                                                                                                    3.23          Review of systems  Headaches but no significant visual changes see above  Blind spots seem to be stable  No obscurations    No diplopia dysarthria dysphagia  No chest pain    Has had some flareup of her asthma stable today    Dry eyes due to her contacts following with optometrist    Otherwise exam stable    Discussed past clotting disorder  Has followed with hematology  Is on warfarin    Family history is positive for clots in multiple family members son without factor V problem        Exam  Blood pressure 117/85, pulse 86  HEENT normal  Lungs clear  Heart rate regular  Abdomen soft  Symmetrical pulses  No edema in the feet    Neurologic exam  Alert oriented x3  No aphasia  No neglect  Normal memory recall    Cranial nerves II through XII significant for   blind spot is possibly minimally in creased subtle  Optic fundi appear relatively normal    No ophthalmoplegia no nystagmus tongue twisters good face symmetrical  No actual visual field cut    Upper extremities  No drift no tremor normal finger-nose    Lower extremity strength normal    Reflexes symmetrical toes downgoing    Gait normal      Assessment/Plan     1.  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 Mcleod/ ophthalmologist following and tracking visual fields for good visual field to see if there is increased papilledema      2.  Factor V Leiden  (D68.51)       Continue blood thinner warfarin INRs checked through primary       1994 maulik sinus thrombosis intracranially       2014 multifocal peripheral venous thrombosis and bilateral pulmonary emboli restarted warfarin    3.  RENETTA (Obstructive Sleep Apnea) (G47.33) Is wearing the CPAP mask at this time    She knows the risks and benefits of Diamox and the need for using this.   She stays well hydrated.   She knows about kidney stones, and this was re-discussed today also.   It can also cause electrolyte abnormalities, but she is on  low dose at this time.    Current plan:    1. Diamox 250 mg tablet up to 3 tablets/day, usually on 2 uses a third as a rescue  2. Should follow up yearly basis sooner if there is problems  3. See ophthalmologist for formal visual fields  4. Anticoagulation is controlled by her primary for her difficulty with clots, pulmonary emboli, and her clotting disorder as above.      In the past  5. Follow-up with a sleep specialist to see if she has narcoleptic tendencies or not she has some ADD the Adderall probably helps with that with concentration I do not feel that she has a progressive memory problem but if she is getting poor sleep she had ADD concentration may be difficult we had a long discussion about that she probably did not have any significant brain damage from her past clots but needs to treat her symptoms when they arise    Should follow-up with ophthalmology  Continue with Diamox  Follow-up in October 2023    Reviewed past history since the last year and a half  Multiple issues as above discussed    Total care time today 42 minutes      As per the visit today  Reviewed multiple laboratory data  Multiple issues clotting disorder/pseudotumor/headaches discussed        Again, thank you for allowing me to participate in the care of your patient.        Sincerely,        Sharath Riggs MD

## 2023-01-25 NOTE — NURSING NOTE
Chief Complaint   Patient presents with     Headache     Pt states occasionally will have to take a third diamox. Not sure if it's more sinus pressure, but of she has a headache she will take an extra.     Pseudotumor cerebri      Annual follow up     Nisreen Horner LPN on 1/25/2023 at 11:00 AM

## 2023-03-13 ENCOUNTER — APPOINTMENT (OUTPATIENT)
Dept: CT IMAGING | Facility: HOSPITAL | Age: 53
End: 2023-03-13
Attending: EMERGENCY MEDICINE
Payer: COMMERCIAL

## 2023-03-13 ENCOUNTER — HOSPITAL ENCOUNTER (EMERGENCY)
Facility: HOSPITAL | Age: 53
Discharge: HOME | End: 2023-03-13
Attending: EMERGENCY MEDICINE | Admitting: EMERGENCY MEDICINE
Payer: COMMERCIAL

## 2023-03-13 VITALS
TEMPERATURE: 98 F | DIASTOLIC BLOOD PRESSURE: 70 MMHG | OXYGEN SATURATION: 99 % | SYSTOLIC BLOOD PRESSURE: 140 MMHG | RESPIRATION RATE: 16 BRPM | HEART RATE: 72 BPM

## 2023-03-13 DIAGNOSIS — R10.12 ABDOMINAL DISCOMFORT IN LEFT UPPER QUADRANT: ICD-10-CM

## 2023-03-13 PROBLEM — E66.9 OBESITY: Status: ACTIVE | Noted: 2023-03-13

## 2023-03-13 PROBLEM — G47.33 OBSTRUCTIVE SLEEP APNEA SYNDROME: Status: ACTIVE | Noted: 2023-03-13

## 2023-03-13 PROBLEM — Z87.2 HISTORY OF CHRONIC URTICARIA: Status: ACTIVE | Noted: 2019-07-09

## 2023-03-13 LAB
ALBUMIN SERPL BCG-MCNC: 4.3 G/DL (ref 3.5–5.2)
ALP SERPL-CCNC: 97 U/L (ref 35–104)
ALT SERPL W P-5'-P-CCNC: 11 U/L (ref 10–35)
ANION GAP SERPL CALCULATED.3IONS-SCNC: 11 MMOL/L (ref 7–15)
AST SERPL W P-5'-P-CCNC: 17 U/L (ref 10–35)
BILIRUB DIRECT SERPL-MCNC: <0.2 MG/DL (ref 0–0.3)
BILIRUB SERPL-MCNC: 0.3 MG/DL
BUN SERPL-MCNC: 12.3 MG/DL (ref 6–20)
CALCIUM SERPL-MCNC: 9.3 MG/DL (ref 8.6–10)
CHLORIDE SERPL-SCNC: 105 MMOL/L (ref 98–107)
CREAT SERPL-MCNC: 0.93 MG/DL (ref 0.51–0.95)
DEPRECATED HCO3 PLAS-SCNC: 24 MMOL/L (ref 22–29)
ERYTHROCYTE [DISTWIDTH] IN BLOOD BY AUTOMATED COUNT: 13.5 % (ref 10–15)
GFR SERPL CREATININE-BSD FRML MDRD: 74 ML/MIN/1.73M2
GLUCOSE SERPL-MCNC: 106 MG/DL (ref 70–99)
HCT VFR BLD AUTO: 42 % (ref 35–47)
HGB BLD-MCNC: 14.3 G/DL (ref 11.7–15.7)
HOLD SPECIMEN: NORMAL
INR PPP: 1.89 (ref 0.85–1.15)
LIPASE SERPL-CCNC: 44 U/L (ref 13–60)
MCH RBC QN AUTO: 30.2 PG (ref 26.5–33)
MCHC RBC AUTO-ENTMCNC: 34 G/DL (ref 31.5–36.5)
MCV RBC AUTO: 89 FL (ref 78–100)
MONOCYTES NFR BLD AUTO: NEGATIVE %
PLATELET # BLD AUTO: 338 10E3/UL (ref 150–450)
POTASSIUM SERPL-SCNC: 3.7 MMOL/L (ref 3.4–5.3)
PROT SERPL-MCNC: 7.4 G/DL (ref 6.4–8.3)
RBC # BLD AUTO: 4.74 10E6/UL (ref 3.8–5.2)
SODIUM SERPL-SCNC: 140 MMOL/L (ref 136–145)
WBC # BLD AUTO: 9.7 10E3/UL (ref 4–11)

## 2023-03-13 PROCEDURE — 86308 HETEROPHILE ANTIBODY SCREEN: CPT | Performed by: EMERGENCY MEDICINE

## 2023-03-13 PROCEDURE — 85027 COMPLETE CBC AUTOMATED: CPT | Performed by: EMERGENCY MEDICINE

## 2023-03-13 PROCEDURE — 99285 EMERGENCY DEPT VISIT HI MDM: CPT | Mod: 25

## 2023-03-13 PROCEDURE — 82248 BILIRUBIN DIRECT: CPT | Performed by: EMERGENCY MEDICINE

## 2023-03-13 PROCEDURE — 83690 ASSAY OF LIPASE: CPT | Performed by: EMERGENCY MEDICINE

## 2023-03-13 PROCEDURE — 74177 CT ABD & PELVIS W/CONTRAST: CPT

## 2023-03-13 PROCEDURE — 36415 COLL VENOUS BLD VENIPUNCTURE: CPT | Performed by: EMERGENCY MEDICINE

## 2023-03-13 PROCEDURE — 80053 COMPREHEN METABOLIC PANEL: CPT | Performed by: EMERGENCY MEDICINE

## 2023-03-13 PROCEDURE — 250N000011 HC RX IP 250 OP 636: Performed by: EMERGENCY MEDICINE

## 2023-03-13 PROCEDURE — 85610 PROTHROMBIN TIME: CPT | Performed by: EMERGENCY MEDICINE

## 2023-03-13 RX ORDER — IOPAMIDOL 755 MG/ML
100 INJECTION, SOLUTION INTRAVASCULAR ONCE
Status: COMPLETED | OUTPATIENT
Start: 2023-03-13 | End: 2023-03-13

## 2023-03-13 RX ADMIN — IOPAMIDOL 100 ML: 755 INJECTION, SOLUTION INTRAVENOUS at 16:47

## 2023-03-13 ASSESSMENT — ENCOUNTER SYMPTOMS
SORE THROAT: 1
ADENOPATHY: 1
NAUSEA: 1
CONSTIPATION: 0
APPETITE CHANGE: 1
ABDOMINAL PAIN: 1
VOMITING: 0

## 2023-03-13 ASSESSMENT — ACTIVITIES OF DAILY LIVING (ADL)
ADLS_ACUITY_SCORE: 33
ADLS_ACUITY_SCORE: 35

## 2023-03-13 NOTE — ED PROVIDER NOTES
"EMERGENCY DEPARTMENT ENCOUNTER      NAME: Anna Nieves  AGE: 52 year old female  YOB: 1970  MRN: [id number]  EVALUATION DATE & TIME: No admission date for patient encounter.    PCP: Itzel Young    ED PROVIDER: Jaron Davis M.D.      Chief Complaint   Patient presents with     Abdominal Pain         FINAL IMPRESSION:  1. Abdominal discomfort in left upper quadrant          ED COURSE & MEDICAL DECISION MAKING:    Pertinent Labs & Imaging studies reviewed below.  All EKGs below represent my independent interpretation.   ED Course as of 03/13/23 1728   Mon Mar 13, 2023   1518 Patient is a 52-year-old woman with history of factor V Leiden, presents with discomfort in the abdomen, swelling in the left upper aspect for the past few days after experiencing a \"popping\" sensation.  She denies any urban pain, but is concerned because she is on blood thinners.  Arrival blood pressure is mildly elevated at 149 systolic, normal temperature.  Heart rate is mildly elevated at 109.  Abdominal exam is benign, she notes some discomfort when I push in the left upper quadrant.  She also notes some soreness in the right side of her throat.  She may have mono or another viral illness that is causing splenomegaly, and with the description of her symptoms I like to make sure that there is no evidence of intra-abdominal, or splenic hematoma.  Lab work, CT scan ordered.  Monoscreen ordered.   1615 INR(!): 1.89   1615 Lipase: 44   1615 Basic metabolic panel(!)  WNL   1615 Hepatic function panel  WNL   1632 Mononucleosis Screen: Negative   1718 CT Abdomen Pelvis w Contrast  1.  No abnormalities seen to explain patient's left upper quadrant pain. No evidence for splenomegaly or a hematoma.   1723 Patient was updated, provided reassurance, follow-up with primary care doctor for any further work-up.  Unclear what is causing her symptoms, but thankfully no evidence of intra-abdominal hematoma, infection, or emergent " pathology.       Additional ED Course Timestamps:  2:40 PM I introduced myself to the patient, obtained patient history, performed a physical exam, and discussed plan for ED workup including potential diagnostic laboratory/imaging studies and interventions.  5:20 PM Rechecked and updated the patient. We discussed the plan for discharge and the patient is agreeable. Reviewed supportive cares, symptomatic treatment, outpatient follow up, and reasons to return to the Emergency Department. Patient to be discharged by ED RN.     Medical Decision Making    History:    Supplemental history from: N/A    External Record(s) reviewed: Documented in chart, if applicable.    Work Up:    Chart documentation includes differential considered and any EKGs or imaging independently interpreted by provider, where specified.    In additional to work up documented, I considered the following work up: Documented in chart, if applicable.    External consultation:    Discussion of management with another provider: Documented in chart, if applicable    Complicating factors:    Care impacted by chronic illness: Other: Factor V Leiden    Care affected by social determinants of health: N/A    Disposition considerations: Discharge. No recommendations on prescription strength medication(s). See documentation for any additional details.        At the conclusion of the encounter I discussed the results of all of the tests and the disposition. The questions were answered. The patient or family acknowledged understanding and was agreeable with the care plan.       MEDICATIONS GIVEN IN THE EMERGENCY:  Medications   iopamidol (ISOVUE-370) solution 100 mL (100 mLs Intravenous $Given 3/13/23 2779)         NEW PRESCRIPTIONS STARTED AT TODAY'S ER VISIT  New Prescriptions    No medications on file          =================================================================    HPI    Patient information was obtained from: Patient    Use of : N/A     "  Anna Nieves is a 52 year old female with a pertinent history of Parkinson's disease, Factor V Leiden mutation, DVT, and PE who presents to this ED for evaluation of abdominal pain.    The patient reports a few days ago she started to have a pulling sensation in her mid abdomen and felt bloated, but she attributed the discomfort to something she had eaten. 2 days ago she felt 2 \"pops\" in her abdomen and the pulling sensation resolved. Later she noticed a bloody discharge in her naval but was not able to identify and sores or lacerations in her naval. Later she developed a discomfort in her left lower abdomen she describes as a fullness. The fullness has come and gone over the past couple days and occasionally extends up into her left upper abdomen. She endorses some nausea and has had a decreased appetite. She has not had any vomiting and is still passing bowel movements. She has not had any prior abdominal surgeries.    She also reports she had a sore throat about a week ago and has since noticed an enlarge lymph node in the right side of her neck with associated pain. The pain is worse when she turns her head to the left.    She denies any other complaints at this time.             REVIEW OF SYSTEMS   Review of Systems   Constitutional: Positive for appetite change (decrease).   HENT: Positive for sore throat.    Gastrointestinal: Positive for abdominal pain and nausea. Negative for constipation and vomiting.   Hematological: Positive for adenopathy (right neck ).   All other systems reviewed and are negative.    VITALS:  BP (!) 141/75   Pulse 73   Temp 98  F (36.7  C) (Oral)   Resp 18   SpO2 100%     PHYSICAL EXAM    Constitutional: Well developed, well nourished. Comfortable appearing.  HENT: Normocephalic, atraumatic, mucous membranes moist, nose normal. Neck- Supple, gross ROM intact.   Eyes: Pupils mid-range, conjunctiva without injection, no discharge.   Respiratory: Clear to auscultation " bilaterally, no respiratory distress, no wheezing, speaks full sentences easily. No cough.  Cardiovascular: Normal heart rate, regular rhythm, no murmurs.   GI: Soft, minimal discomfort in the LUQ, no masses, no lacerations or bleeding around the umbilicus.  Musculoskeletal: Moving all 4 extremities intentionally and without pain. No obvious deformity.  Skin: Warm, dry, no rash.  Neurologic: Alert & oriented x 3, cranial nerves grossly intact.  Psychiatric: Affect normal, cooperative.      PROCEDURES:   None      I, Omega Valle am serving as a scribe to document services personally performed by Dr. Jaron Davis based on my observation and the provider's statements to me. IJaron MD attest that Omega Valle is acting in a scribe capacity, has observed my performance of the services and has documented them in accordance with my direction.    Jaron Davis M.D.  Emergency Medicine  [facility] EMERGENCY DEPARTMENT  [facility] [facility] [address]  [phone]  Dept: [phone]     Jaron Davis MD  03/13/23 9651

## 2023-03-13 NOTE — ED TRIAGE NOTES
Pt presents to triage ambulatory for abdominal pain. Pt reports felt a pop in her abdomen about two days ago and has been having discomfort since. Reports she feels a pressure that pushes up into her chest intermittently. Discomfort is mostly in LUQ of abdomen. Reports she had some blood in her naval.

## 2023-03-13 NOTE — DISCHARGE INSTRUCTIONS
Thankfully no sign of mononucleosis, hematoma, spleen injury.  The bowels are normal.  Please recheck your primary care doctor for further evaluation.

## 2023-04-16 ENCOUNTER — HEALTH MAINTENANCE LETTER (OUTPATIENT)
Age: 53
End: 2023-04-16

## 2023-05-14 ENCOUNTER — HOSPITAL ENCOUNTER (OUTPATIENT)
Facility: CLINIC | Age: 53
Setting detail: OBSERVATION
Discharge: HOME | End: 2023-05-15
Attending: EMERGENCY MEDICINE | Admitting: PHYSICIAN ASSISTANT
Payer: COMMERCIAL

## 2023-05-14 ENCOUNTER — APPOINTMENT (OUTPATIENT)
Dept: CT IMAGING | Facility: HOSPITAL | Age: 53
End: 2023-05-14
Attending: EMERGENCY MEDICINE
Payer: COMMERCIAL

## 2023-05-14 ENCOUNTER — HOSPITAL ENCOUNTER (EMERGENCY)
Facility: HOSPITAL | Age: 53
Discharge: ANOTHER HOSPITAL | End: 2023-05-14
Attending: EMERGENCY MEDICINE | Admitting: EMERGENCY MEDICINE
Payer: COMMERCIAL

## 2023-05-14 VITALS
SYSTOLIC BLOOD PRESSURE: 141 MMHG | TEMPERATURE: 97.8 F | HEART RATE: 94 BPM | BODY MASS INDEX: 44.56 KG/M2 | OXYGEN SATURATION: 97 % | WEIGHT: 261.02 LBS | HEIGHT: 64 IN | DIASTOLIC BLOOD PRESSURE: 85 MMHG | RESPIRATION RATE: 18 BRPM

## 2023-05-14 DIAGNOSIS — D68.2 HEREDITARY COAGULATION FACTOR DEFICIENCY (H): ICD-10-CM

## 2023-05-14 DIAGNOSIS — R11.0 NAUSEA: ICD-10-CM

## 2023-05-14 DIAGNOSIS — S02.30XA CLOSED FRACTURE OF ORBITAL FLOOR, UNSPECIFIED LATERALITY, INITIAL ENCOUNTER (H): ICD-10-CM

## 2023-05-14 DIAGNOSIS — R79.1 ABNORMAL COAGULATION PROFILE: ICD-10-CM

## 2023-05-14 DIAGNOSIS — S02.32XA CLOSED BLOW-OUT FRACTURE OF LEFT ORBIT, INITIAL ENCOUNTER (H): ICD-10-CM
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
## 2023-05-14 DIAGNOSIS — G93.2 BENIGN INTRACRANIAL HYPERTENSION: ICD-10-CM

## 2023-05-14 DIAGNOSIS — S09.90XA INJURY OF HEAD, INITIAL ENCOUNTER: ICD-10-CM

## 2023-05-14 DIAGNOSIS — H53.2 DIPLOPIA: ICD-10-CM

## 2023-05-14 DIAGNOSIS — S02.32XA CLOSED FRACTURE OF LEFT ORBITAL FLOOR, INITIAL ENCOUNTER (H): ICD-10-CM

## 2023-05-14 DIAGNOSIS — Z79.01 LONG TERM (CURRENT) USE OF ANTICOAGULANTS: ICD-10-CM

## 2023-05-14 LAB — INR PPP: 1.7 (ref 0.85–1.15)

## 2023-05-14 PROCEDURE — 250N000011 HC RX IP 250 OP 636: Performed by: EMERGENCY MEDICINE

## 2023-05-14 PROCEDURE — 85610 PROTHROMBIN TIME: CPT | Performed by: EMERGENCY MEDICINE

## 2023-05-14 PROCEDURE — 99285 EMERGENCY DEPT VISIT HI MDM: CPT | Mod: 25

## 2023-05-14 PROCEDURE — 99285 EMERGENCY DEPT VISIT HI MDM: CPT | Performed by: EMERGENCY MEDICINE

## 2023-05-14 PROCEDURE — 70486 CT MAXILLOFACIAL W/O DYE: CPT

## 2023-05-14 PROCEDURE — 99222 1ST HOSP IP/OBS MODERATE 55: CPT | Performed by: PHYSICIAN ASSISTANT

## 2023-05-14 PROCEDURE — 99285 EMERGENCY DEPT VISIT HI MDM: CPT | Mod: 25 | Performed by: EMERGENCY MEDICINE

## 2023-05-14 PROCEDURE — 70450 CT HEAD/BRAIN W/O DYE: CPT

## 2023-05-14 PROCEDURE — 36415 COLL VENOUS BLD VENIPUNCTURE: CPT | Performed by: EMERGENCY MEDICINE

## 2023-05-14 RX ORDER — ONDANSETRON 4 MG/1
4 TABLET, ORALLY DISINTEGRATING ORAL ONCE
Status: COMPLETED | OUTPATIENT
Start: 2023-05-14 | End: 2023-05-14

## 2023-05-14 RX ADMIN — ONDANSETRON 4 MG: 4 TABLET, ORALLY DISINTEGRATING ORAL at 22:06

## 2023-05-14 ASSESSMENT — ACTIVITIES OF DAILY LIVING (ADL): ADLS_ACUITY_SCORE: 35

## 2023-05-14 NOTE — LETTER
[facility] [address] [facility]  [address] [address]  Phone: [phone]  Fax: [phone]    May 15, 2023        Anna Nieves  [address] [facility] [address]          To whom it may concern:    RE: Anna Nieves    Patient was seen and treated today at our hospital and will not be able to drive for the following days: 5/14/23 and 5/15/23, 5/16/23, 5/17/23, 5/18/23, 5/19/23, 5/20/23, 5/21/23  Patient may return to driving on 5/22/23.     Please contact me for questions or concerns.      Sincerely,    GUEVARA Mays, NP  Emergency Department/OBS unit

## 2023-05-14 NOTE — LETTER
[facility] UNIT 6D OBSERVATION [address] [address]  Phone: [phone]  Fax: [phone]    May 15, 2023        Anna Nieves  [address] [place] [id number]          To whom it may concern:    RE: Anna Nieves    Patient was seen and treated today at our hospital and missed work on 5/14/23 and 5/15/23, 5/16/23, 5/17/23, 5/18/23, 5/19/23, 5/20/23, 5/21/23  Patient may return to work 5/22/23.     Please contact me for questions or concerns.      Sincerely,        GUEVARA Mays, NP  Emergency Department/Observation unit.

## 2023-05-15 ENCOUNTER — APPOINTMENT (OUTPATIENT)
Dept: OCCUPATIONAL THERAPY | Facility: CLINIC | Age: 53
End: 2023-05-15
Attending: PHYSICIAN ASSISTANT
Payer: COMMERCIAL

## 2023-05-15 ENCOUNTER — APPOINTMENT (OUTPATIENT)
Dept: CT IMAGING | Facility: CLINIC | Age: 53
End: 2023-05-15
Attending: PHYSICIAN ASSISTANT
Payer: COMMERCIAL

## 2023-05-15 ENCOUNTER — APPOINTMENT (OUTPATIENT)
Dept: CT IMAGING | Facility: CLINIC | Age: 53
End: 2023-05-15
Attending: EMERGENCY MEDICINE
Payer: COMMERCIAL

## 2023-05-15 VITALS
SYSTOLIC BLOOD PRESSURE: 135 MMHG | BODY MASS INDEX: 44.26 KG/M2 | HEART RATE: 80 BPM | WEIGHT: 259.26 LBS | HEIGHT: 64 IN | RESPIRATION RATE: 18 BRPM | DIASTOLIC BLOOD PRESSURE: 85 MMHG | TEMPERATURE: 98.9 F | OXYGEN SATURATION: 100 %

## 2023-05-15 PROBLEM — S02.30XA: Status: ACTIVE | Noted: 2023-05-15

## 2023-05-15 PROBLEM — S09.90XA INJURY OF HEAD, INITIAL ENCOUNTER: Status: ACTIVE | Noted: 2023-05-15

## 2023-05-15 LAB
ALBUMIN SERPL BCG-MCNC: 4.1 G/DL (ref 3.5–5.2)
ALP SERPL-CCNC: 91 U/L (ref 35–104)
ALT SERPL W P-5'-P-CCNC: 11 U/L (ref 10–35)
ANION GAP SERPL CALCULATED.3IONS-SCNC: 10 MMOL/L (ref 7–15)
ANION GAP SERPL CALCULATED.3IONS-SCNC: 11 MMOL/L (ref 7–15)
APTT PPP: 30 SECONDS (ref 22–38)
AST SERPL W P-5'-P-CCNC: 15 U/L (ref 10–35)
BASOPHILS # BLD AUTO: 0 10E3/UL (ref 0–0.2)
BASOPHILS # BLD AUTO: 0.1 10E3/UL (ref 0–0.2)
BASOPHILS NFR BLD AUTO: 0 %
BASOPHILS NFR BLD AUTO: 0 %
BILIRUB SERPL-MCNC: 0.3 MG/DL
BUN SERPL-MCNC: 10.6 MG/DL (ref 6–20)
BUN SERPL-MCNC: 11.5 MG/DL (ref 6–20)
CALCIUM SERPL-MCNC: 8.9 MG/DL (ref 8.6–10)
CALCIUM SERPL-MCNC: 9.6 MG/DL (ref 8.6–10)
CHLORIDE SERPL-SCNC: 106 MMOL/L (ref 98–107)
CHLORIDE SERPL-SCNC: 109 MMOL/L (ref 98–107)
CREAT SERPL-MCNC: 0.81 MG/DL (ref 0.51–0.95)
CREAT SERPL-MCNC: 0.9 MG/DL (ref 0.51–0.95)
DEPRECATED HCO3 PLAS-SCNC: 22 MMOL/L (ref 22–29)
DEPRECATED HCO3 PLAS-SCNC: 22 MMOL/L (ref 22–29)
EOSINOPHIL # BLD AUTO: 0.2 10E3/UL (ref 0–0.7)
EOSINOPHIL # BLD AUTO: 0.3 10E3/UL (ref 0–0.7)
EOSINOPHIL NFR BLD AUTO: 2 %
EOSINOPHIL NFR BLD AUTO: 2 %
ERYTHROCYTE [DISTWIDTH] IN BLOOD BY AUTOMATED COUNT: 14.4 % (ref 10–15)
ERYTHROCYTE [DISTWIDTH] IN BLOOD BY AUTOMATED COUNT: 14.6 % (ref 10–15)
GFR SERPL CREATININE-BSD FRML MDRD: 77 ML/MIN/1.73M2
GFR SERPL CREATININE-BSD FRML MDRD: 87 ML/MIN/1.73M2
GLUCOSE SERPL-MCNC: 124 MG/DL (ref 70–99)
GLUCOSE SERPL-MCNC: 139 MG/DL (ref 70–99)
HCT VFR BLD AUTO: 40.3 % (ref 35–47)
HCT VFR BLD AUTO: 41.4 % (ref 35–47)
HGB BLD-MCNC: 12.6 G/DL (ref 11.7–15.7)
HGB BLD-MCNC: 13 G/DL (ref 11.7–15.7)
IMM GRANULOCYTES # BLD: 0.1 10E3/UL
IMM GRANULOCYTES # BLD: 0.1 10E3/UL
IMM GRANULOCYTES NFR BLD: 0 %
IMM GRANULOCYTES NFR BLD: 0 %
INR PPP: 1.68 (ref 0.85–1.15)
LYMPHOCYTES # BLD AUTO: 1.8 10E3/UL (ref 0.8–5.3)
LYMPHOCYTES # BLD AUTO: 1.9 10E3/UL (ref 0.8–5.3)
LYMPHOCYTES NFR BLD AUTO: 14 %
LYMPHOCYTES NFR BLD AUTO: 15 %
MCH RBC QN AUTO: 28.8 PG (ref 26.5–33)
MCH RBC QN AUTO: 28.8 PG (ref 26.5–33)
MCHC RBC AUTO-ENTMCNC: 31.3 G/DL (ref 31.5–36.5)
MCHC RBC AUTO-ENTMCNC: 31.4 G/DL (ref 31.5–36.5)
MCV RBC AUTO: 92 FL (ref 78–100)
MCV RBC AUTO: 92 FL (ref 78–100)
MONOCYTES # BLD AUTO: 0.5 10E3/UL (ref 0–1.3)
MONOCYTES # BLD AUTO: 0.6 10E3/UL (ref 0–1.3)
MONOCYTES NFR BLD AUTO: 4 %
MONOCYTES NFR BLD AUTO: 5 %
NEUTROPHILS # BLD AUTO: 10.7 10E3/UL (ref 1.6–8.3)
NEUTROPHILS # BLD AUTO: 9.6 10E3/UL (ref 1.6–8.3)
NEUTROPHILS NFR BLD AUTO: 79 %
NEUTROPHILS NFR BLD AUTO: 79 %
NRBC # BLD AUTO: 0 10E3/UL
NRBC # BLD AUTO: 0 10E3/UL
NRBC BLD AUTO-RTO: 0 /100
NRBC BLD AUTO-RTO: 0 /100
PLATELET # BLD AUTO: 276 10E3/UL (ref 150–450)
PLATELET # BLD AUTO: 297 10E3/UL (ref 150–450)
POTASSIUM SERPL-SCNC: 3.6 MMOL/L (ref 3.4–5.3)
POTASSIUM SERPL-SCNC: 4 MMOL/L (ref 3.4–5.3)
PROT SERPL-MCNC: 7 G/DL (ref 6.4–8.3)
RBC # BLD AUTO: 4.38 10E6/UL (ref 3.8–5.2)
RBC # BLD AUTO: 4.52 10E6/UL (ref 3.8–5.2)
SODIUM SERPL-SCNC: 139 MMOL/L (ref 136–145)
SODIUM SERPL-SCNC: 141 MMOL/L (ref 136–145)
WBC # BLD AUTO: 12.2 10E3/UL (ref 4–11)
WBC # BLD AUTO: 13.5 10E3/UL (ref 4–11)

## 2023-05-15 PROCEDURE — 85610 PROTHROMBIN TIME: CPT | Performed by: EMERGENCY MEDICINE

## 2023-05-15 PROCEDURE — 85730 THROMBOPLASTIN TIME PARTIAL: CPT | Performed by: EMERGENCY MEDICINE

## 2023-05-15 PROCEDURE — 97535 SELF CARE MNGMENT TRAINING: CPT | Mod: GO

## 2023-05-15 PROCEDURE — 80053 COMPREHEN METABOLIC PANEL: CPT | Performed by: EMERGENCY MEDICINE

## 2023-05-15 PROCEDURE — G0378 HOSPITAL OBSERVATION PER HR: HCPCS

## 2023-05-15 PROCEDURE — 72125 CT NECK SPINE W/O DYE: CPT

## 2023-05-15 PROCEDURE — 70450 CT HEAD/BRAIN W/O DYE: CPT | Mod: 26 | Performed by: RADIOLOGY

## 2023-05-15 PROCEDURE — 72125 CT NECK SPINE W/O DYE: CPT | Mod: 26 | Performed by: RADIOLOGY

## 2023-05-15 PROCEDURE — 99238 HOSP IP/OBS DSCHRG MGMT 30/<: CPT | Mod: FS | Performed by: INTERNAL MEDICINE

## 2023-05-15 PROCEDURE — 250N000013 HC RX MED GY IP 250 OP 250 PS 637: Performed by: PHYSICIAN ASSISTANT

## 2023-05-15 PROCEDURE — 36415 COLL VENOUS BLD VENIPUNCTURE: CPT | Performed by: PHYSICIAN ASSISTANT

## 2023-05-15 PROCEDURE — 99207 PR SERVICE NOT STAFFED W/SUPERV PROV: CPT | Performed by: STUDENT IN AN ORGANIZED HEALTH CARE EDUCATION/TRAINING PROGRAM

## 2023-05-15 PROCEDURE — 36415 COLL VENOUS BLD VENIPUNCTURE: CPT | Performed by: EMERGENCY MEDICINE

## 2023-05-15 PROCEDURE — 70450 CT HEAD/BRAIN W/O DYE: CPT

## 2023-05-15 PROCEDURE — 250N000013 HC RX MED GY IP 250 OP 250 PS 637

## 2023-05-15 PROCEDURE — 250N000013 HC RX MED GY IP 250 OP 250 PS 637: Performed by: EMERGENCY MEDICINE

## 2023-05-15 PROCEDURE — 85025 COMPLETE CBC W/AUTO DIFF WBC: CPT | Performed by: EMERGENCY MEDICINE

## 2023-05-15 PROCEDURE — 999N000157 HC STATISTIC RCP TIME EA 10 MIN

## 2023-05-15 PROCEDURE — 97165 OT EVAL LOW COMPLEX 30 MIN: CPT | Mod: GO

## 2023-05-15 PROCEDURE — 85025 COMPLETE CBC W/AUTO DIFF WBC: CPT | Performed by: PHYSICIAN ASSISTANT

## 2023-05-15 RX ORDER — DIMENHYDRINATE 50 MG
50 TABLET ORAL
Qty: 7 TABLET | Refills: 0 | Status: ON HOLD | OUTPATIENT
Start: 2023-05-15 | End: 2023-07-07

## 2023-05-15 RX ORDER — CALCIUM CARBONATE 500 MG/1
500 TABLET, CHEWABLE ORAL ONCE
Status: COMPLETED | OUTPATIENT
Start: 2023-05-15 | End: 2023-05-15

## 2023-05-15 RX ORDER — NALOXONE HYDROCHLORIDE 0.4 MG/ML
0.4 INJECTION, SOLUTION INTRAMUSCULAR; INTRAVENOUS; SUBCUTANEOUS
Status: DISCONTINUED | OUTPATIENT
Start: 2023-05-15 | End: 2023-05-15 | Stop reason: HOSPADM

## 2023-05-15 RX ORDER — PROCHLORPERAZINE MALEATE 10 MG
10 TABLET ORAL EVERY 6 HOURS PRN
Status: DISCONTINUED | OUTPATIENT
Start: 2023-05-15 | End: 2023-05-15 | Stop reason: HOSPADM

## 2023-05-15 RX ORDER — WARFARIN SODIUM 5 MG/1
5 TABLET ORAL
Status: DISCONTINUED | OUTPATIENT
Start: 2023-05-15 | End: 2023-05-15 | Stop reason: HOSPADM

## 2023-05-15 RX ORDER — ONDANSETRON 2 MG/ML
4 INJECTION INTRAMUSCULAR; INTRAVENOUS ONCE
Status: DISCONTINUED | OUTPATIENT
Start: 2023-05-15 | End: 2023-05-15 | Stop reason: HOSPADM

## 2023-05-15 RX ORDER — CETIRIZINE HYDROCHLORIDE 10 MG/1
10 TABLET ORAL DAILY
Status: DISCONTINUED | OUTPATIENT
Start: 2023-05-15 | End: 2023-05-15 | Stop reason: HOSPADM

## 2023-05-15 RX ORDER — ACETAZOLAMIDE 250 MG/1
250 TABLET ORAL 3 TIMES DAILY
Status: DISCONTINUED | OUTPATIENT
Start: 2023-05-15 | End: 2023-05-15

## 2023-05-15 RX ORDER — HYDROCODONE BITARTRATE AND ACETAMINOPHEN 5; 325 MG/1; MG/1
1 TABLET ORAL EVERY 6 HOURS PRN
Qty: 5 TABLET | Refills: 0 | Status: SHIPPED | OUTPATIENT
Start: 2023-05-15 | End: 2023-12-27

## 2023-05-15 RX ORDER — ACETAMINOPHEN 325 MG/1
650 TABLET ORAL EVERY 6 HOURS PRN
Status: DISCONTINUED | OUTPATIENT
Start: 2023-05-15 | End: 2023-05-15

## 2023-05-15 RX ORDER — THYROID 60 MG/1
120 TABLET ORAL
Status: DISCONTINUED | OUTPATIENT
Start: 2023-05-15 | End: 2023-05-15 | Stop reason: HOSPADM

## 2023-05-15 RX ORDER — CALCIUM CARBONATE 500 MG/1
500 TABLET, CHEWABLE ORAL 3 TIMES DAILY PRN
Status: DISCONTINUED | OUTPATIENT
Start: 2023-05-15 | End: 2023-05-15 | Stop reason: HOSPADM

## 2023-05-15 RX ORDER — PROCHLORPERAZINE 25 MG
25 SUPPOSITORY, RECTAL RECTAL EVERY 12 HOURS PRN
Status: DISCONTINUED | OUTPATIENT
Start: 2023-05-15 | End: 2023-05-15 | Stop reason: HOSPADM

## 2023-05-15 RX ORDER — ONDANSETRON 2 MG/ML
4 INJECTION INTRAMUSCULAR; INTRAVENOUS EVERY 6 HOURS PRN
Status: DISCONTINUED | OUTPATIENT
Start: 2023-05-15 | End: 2023-05-15 | Stop reason: HOSPADM

## 2023-05-15 RX ORDER — PANTOPRAZOLE SODIUM 40 MG/1
40 TABLET, DELAYED RELEASE ORAL DAILY PRN
Status: DISCONTINUED | OUTPATIENT
Start: 2023-05-15 | End: 2023-05-15 | Stop reason: HOSPADM

## 2023-05-15 RX ORDER — NALOXONE HYDROCHLORIDE 0.4 MG/ML
0.2 INJECTION, SOLUTION INTRAMUSCULAR; INTRAVENOUS; SUBCUTANEOUS
Status: DISCONTINUED | OUTPATIENT
Start: 2023-05-15 | End: 2023-05-15 | Stop reason: HOSPADM

## 2023-05-15 RX ORDER — HYDROCODONE BITARTRATE AND ACETAMINOPHEN 5; 325 MG/1; MG/1
1 TABLET ORAL EVERY 6 HOURS PRN
Status: DISCONTINUED | OUTPATIENT
Start: 2023-05-15 | End: 2023-05-15 | Stop reason: HOSPADM

## 2023-05-15 RX ORDER — ONDANSETRON 4 MG/1
4 TABLET, ORALLY DISINTEGRATING ORAL EVERY 6 HOURS PRN
Status: DISCONTINUED | OUTPATIENT
Start: 2023-05-15 | End: 2023-05-15 | Stop reason: HOSPADM

## 2023-05-15 RX ORDER — ACETAMINOPHEN 500 MG
1000 TABLET ORAL ONCE
Status: COMPLETED | OUTPATIENT
Start: 2023-05-15 | End: 2023-05-15

## 2023-05-15 RX ORDER — ACETAZOLAMIDE 250 MG/1
250 TABLET ORAL 3 TIMES DAILY
Status: DISCONTINUED | OUTPATIENT
Start: 2023-05-15 | End: 2023-05-15 | Stop reason: HOSPADM

## 2023-05-15 RX ORDER — LIDOCAINE 40 MG/G
CREAM TOPICAL
Status: DISCONTINUED | OUTPATIENT
Start: 2023-05-15 | End: 2023-05-15 | Stop reason: HOSPADM

## 2023-05-15 RX ORDER — WARFARIN SODIUM 5 MG/1
5 TABLET ORAL ONCE
Status: COMPLETED | OUTPATIENT
Start: 2023-05-15 | End: 2023-05-15

## 2023-05-15 RX ORDER — ACETAMINOPHEN 650 MG/1
650 SUPPOSITORY RECTAL EVERY 6 HOURS PRN
Status: DISCONTINUED | OUTPATIENT
Start: 2023-05-15 | End: 2023-05-15

## 2023-05-15 RX ORDER — ONDANSETRON 4 MG/1
4 TABLET, ORALLY DISINTEGRATING ORAL EVERY 6 HOURS PRN
Qty: 5 TABLET | Refills: 0 | Status: ON HOLD | OUTPATIENT
Start: 2023-05-15 | End: 2023-07-07

## 2023-05-15 RX ADMIN — THYROID, PORCINE 120 MG: 60 TABLET ORAL at 08:05

## 2023-05-15 RX ADMIN — WARFARIN SODIUM 5 MG: 5 TABLET ORAL at 03:25

## 2023-05-15 RX ADMIN — ACETAMINOPHEN 1000 MG: 500 TABLET ORAL at 00:51

## 2023-05-15 RX ADMIN — ACETAZOLAMIDE 250 MG: 250 TABLET ORAL at 02:56

## 2023-05-15 RX ADMIN — CALCIUM CARBONATE 500 MG: 500 TABLET, CHEWABLE ORAL at 01:11

## 2023-05-15 ASSESSMENT — ACTIVITIES OF DAILY LIVING (ADL)
ADLS_ACUITY_SCORE: 31
ADLS_ACUITY_SCORE: 31
ADLS_ACUITY_SCORE: 35
ADLS_ACUITY_SCORE: 31
ADLS_ACUITY_SCORE: 35
DEPENDENT_IADLS:: INDEPENDENT
ADLS_ACUITY_SCORE: 31

## 2023-05-15 ASSESSMENT — ENCOUNTER SYMPTOMS
PAIN SEVERITY NOW: MILD
SUBJECTIVE PATIENT PAIN CONTROL: PARTIALLY CONTROLLED
ACTIVITY IMPAIRMENT: NORMAL
DIETARY ISSUES: ADEQUATE INTAKE
SUBJECTIVE PAIN PROGRESSION: UNCHANGED

## 2023-05-15 NOTE — PROGRESS NOTES
"Goal Outcome Evaluation:                Observation goals:    -diagnostic tests and consults completed and resulted. Not Met, Ophthalmology consulted.     -vital signs normal or at patient baseline: Met     -tolerating oral intake to maintain hydration: Met     -adequate pain control on oral analgesics: Met, denies pain.  /85 (BP Location: Right arm)   Pulse 80   Temp 98.9  F (37.2  C) (Oral)   Resp 18   Ht 1.626 m (5' 4\")   Wt 117.6 kg (259 lb 4.2 oz)   SpO2 100%   BMI 44.50 kg/m         "

## 2023-05-15 NOTE — PLAN OF CARE
"Goal Outcome Evaluation:     Observation goals:    BP (!) 149/90   Pulse 79   Temp 98.6  F (37  C) (Oral)   Resp 16   Ht 1.626 m (5' 4\")   Wt 117.6 kg (259 lb 4.2 oz)   SpO2 99%   BMI 44.50 kg/m    -diagnostic tests and consults completed and resulted. Not Met   -vital signs normal or at patient baseline: Met   -tolerating oral intake to maintain hydration: Met   -adequate pain control on oral analgesics: Met        "
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
## 2023-05-15 NOTE — PLAN OF CARE
Occupational Therapy Discharge Summary    Reason for therapy discharge:    All goals and outcomes met, no further needs identified.    Progress towards therapy goal(s). See goals on Care Plan in [facility] electronic health record for goal details.  Goals met    Therapy recommendation(s):    Continued therapy is recommended.  Rationale/Recommendations:  Pt may benefit from OP vision rehab and OP vestibular rehab to manage impaired L eye vision and dizziness/nausea with mobility pending functional improvement with time. Recommend return home with assist as needed with ADLs/IADLs.

## 2023-05-15 NOTE — PROGRESS NOTES
RT talked to pt about using CPAP for tonight and she stated that she believed she was going home today. She would like to wait until she gets updated settings for her home unit by her new doctor as well. Pt stated she would notify us if she changed her mind.    Jeremie Walters,ADRIANO.

## 2023-05-15 NOTE — PROGRESS NOTES
"/85 (BP Location: Right arm)   Pulse 80   Temp 98.9  F (37.2  C) (Oral)   Resp 18   Ht 1.626 m (5' 4\")   Wt 117.6 kg (259 lb 4.2 oz)   SpO2 100%   BMI 44.50 kg/m      Patient's condition and vital signs are stable/WNL. Discharge instructions reviewed with patient and questions answered. Patient verbalizes understanding. PIV removed. Pain under control. Patient is tolerating regular diet and denies any N/V. Patient to be discharged to home via family. Patient has all belongings.    "

## 2023-05-15 NOTE — ED NOTES
Report called to observation unit RN, pt pushed over to obs unit in stretcher and then ambulated to bed. Vitals taken prior to leaving the ed.
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
## 2023-05-15 NOTE — PROGRESS NOTES
"Goal Outcome Evaluation:                Observation goals:    -diagnostic tests and consults completed and resulted. Not Met, Ophthalmology consulted.    -vital signs normal or at patient baseline: Met, /88 (BP Location: Right arm)   Pulse 69   Temp 98.8  F (37.1  C) (Oral)   Resp 16   Ht 1.626 m (5' 4\")   Wt 117.6 kg (259 lb 4.2 oz)   SpO2 98%   BMI 44.50 kg/m       -tolerating oral intake to maintain hydration: Met, tolerated Apple Juices. Denies nausea. Diet has been advanced.    -adequate pain control on oral analgesics: Met, denies pain.        "

## 2023-05-15 NOTE — PROGRESS NOTES
"Anna Nieves is a 52 year old female patient.  1. Closed fracture of orbital floor, unspecified laterality, initial encounter (H)    2. Injury of head, initial encounter    3. Nausea      Past Medical History:   Diagnosis Date     Deep vein thrombosis (H)      Factor V deficiency (H)      Parkinson's disease (H)      Pulmonary embolism (H)      No current outpatient medications on file.     Allergies   Allergen Reactions     Cats Difficulty breathing     Dust Mites Other (See Comments)     Nasal dripping     Mold      Pollen Extract Other (See Comments)     Nasal congestion       Sulfa Antibiotics Hives     Principal Problem:    Injury of head, initial encounter  Active Problems:    Closed fracture of orbital floor, unspecified laterality, initial encounter (H)    Blood pressure 128/88, pulse 69, temperature 98.8  F (37.1  C), temperature source Oral, resp. rate 16, height 1.626 m (5' 4\"), weight 117.6 kg (259 lb 4.2 oz), SpO2 98 %.    Subjective:  Symptoms:  Stable.  (Left eye pain and ?diplopia).    Diet:  Adequate intake.    Activity level: Normal.    Pain:  She complains of pain that is mild.  She reports pain is unchanged.  Pain is partially controlled.      Objective:  General Appearance:  Comfortable.    Vital signs: (most recent): Blood pressure 128/88, pulse 69, temperature 98.8  F (37.1  C), temperature source Oral, resp. rate 16, height 1.626 m (5' 4\"), weight 117.6 kg (259 lb 4.2 oz), SpO2 98 %.  Vital signs are normal.    Output: Producing urine.    HEENT: Normal HEENT exam.  (Left eye echymosis mild)    Lungs:  Normal effort and normal respiratory rate.  Breath sounds clear to auscultation.    Heart: Normal rate.  Regular rhythm.  S1 normal and S2 normal.    Abdomen: Abdomen is soft.  Bowel sounds are normal.   There is no abdominal tenderness.     Extremities: Normal range of motion.    Pulses: Distal pulses are intact.    Neurological: Patient is alert.    Pupils:  Pupils are equal, round, and " reactive to light.    Skin:  Warm.      Assessment:    Condition: In stable condition.  Improving.   (52 yof with Parkinson Factor 5 Leiden presents with left orbital floor fx with ?entrapment after trauma.    OMFS input appreciated, awaiting today's input. Optho consult.    Possible discharge later today.).     The pt was seen and examined by myself. The case was reviewed and the plan was discussed with the COLIN.    Michelle Sharif MD, MD  5/15/2023

## 2023-05-15 NOTE — PLAN OF CARE
"Outpatient/Observation goals to be met before discharge home:   /85 (BP Location: Right arm)   Pulse 80   Temp 98.9  F (37.2  C) (Oral)   Resp 18   Ht 1.626 m (5' 4\")   Wt 117.6 kg (259 lb 4.2 oz)   SpO2 100%   BMI 44.50 kg/m      -diagnostic tests and consults completed and resulted: Not met  -vital signs normal or at patient baseline: Met  -tolerating oral intake to maintain hydration: Met  -adequate pain control on oral analgesics: Met  "
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
## 2023-05-15 NOTE — PHARMACY-ANTICOAGULATION SERVICE
Clinical Pharmacy - Warfarin Dosing Consult     Pharmacy has been consulted to manage this patient s warfarin therapy.  Indication: Other - specify in comments (Factor V leiden, hx VTE)  Therapy Goal: INR 2-3  Warfarin Prior to Admission: Yes  Warfarin PTA Regimen: 3mg Tue,Thu and 4 mg all other days  Dose Comments: Last dose taken Sat 5/13 (4mg)    INR   Date Value Ref Range Status   05/15/2023 1.68 (H) 0.85 - 1.15 Final   05/14/2023 1.70 (H) 0.85 - 1.15 Final     Factor 2 Assay   Date Value Ref Range Status   09/01/2005 19 (L) 60 - 140 % Final       Recommend warfarin 5 mg today (Late dose for 5-14 not taken prior to admission).  Pharmacy will monitor Anna Nieves daily and order warfarin doses to achieve specified goal.      Please contact pharmacy as soon as possible if the warfarin needs to be held for a procedure or if the warfarin goals change.
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
## 2023-05-17 ENCOUNTER — TELEPHONE (OUTPATIENT)
Dept: OPHTHALMOLOGY | Facility: CLINIC | Age: 53
End: 2023-05-17
Payer: COMMERCIAL

## 2023-05-17 ENCOUNTER — NURSE TRIAGE (OUTPATIENT)
Dept: NURSING | Facility: CLINIC | Age: 53
End: 2023-05-17

## 2023-05-17 NOTE — TELEPHONE ENCOUNTER
Called and LVM for Cayetano's     Made an appointment for 5/30 @ 815 am with Dr. Riley     Sent a [employer] message     Saba Bennett Communication Facilitator on 5/17/2023 at 1:52 PM

## 2023-05-17 NOTE — TELEPHONE ENCOUNTER
Pt to f/u in oculoplastics in 1-2 weeks per Ophthalmology hospital consult note and [facility] oculo-plastics team will assist in scheduling/reaching out (previous request sent to team)    Symptoms stable per message with some blurring during computer work.      [phone]  Called twice at 1055 and no answer and mailbox full    Ok to f/u in oculoplastics in lieu of any acute vision loss/changes that are more constant and/or any worsening eye pain/pain with eye movement as previously review by eye provider.    Would encourage preservative free artificial tear use throughout day and use before reading/computer activities.    Will forward to oculo-plastics team for assistance scheduling and Dr. Welch in oculoplastics for review
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
## 2023-05-30 ENCOUNTER — OFFICE VISIT (OUTPATIENT)
Dept: OPHTHALMOLOGY | Facility: CLINIC | Age: 53
End: 2023-05-30
Payer: COMMERCIAL

## 2023-05-30 DIAGNOSIS — S02.30XA CLOSED FRACTURE OF ORBITAL FLOOR, UNSPECIFIED LATERALITY, INITIAL ENCOUNTER (H): Primary | ICD-10-CM

## 2023-05-30 PROCEDURE — 92285 EXTERNAL OCULAR PHOTOGRAPHY: CPT | Mod: GC | Performed by: OPHTHALMOLOGY

## 2023-05-30 PROCEDURE — 99204 OFFICE O/P NEW MOD 45 MIN: CPT | Mod: GC | Performed by: OPHTHALMOLOGY

## 2023-05-30 ASSESSMENT — VISUAL ACUITY
METHOD: SNELLEN - LINEAR
OS_CC+: -2
OD_CC: 20/30
OD_CC+: -2
OS_CC: 20/25
CORRECTION_TYPE: GLASSES

## 2023-05-30 ASSESSMENT — CONF VISUAL FIELD
OS_SUPERIOR_TEMPORAL_RESTRICTION: 0
OD_SUPERIOR_NASAL_RESTRICTION: 0
OD_INFERIOR_NASAL_RESTRICTION: 0
OS_SUPERIOR_NASAL_RESTRICTION: 0
OD_NORMAL: 1
OS_INFERIOR_NASAL_RESTRICTION: 0
OD_SUPERIOR_TEMPORAL_RESTRICTION: 0
OD_INFERIOR_TEMPORAL_RESTRICTION: 0
METHOD: COUNTING FINGERS
OS_INFERIOR_TEMPORAL_RESTRICTION: 0
OS_NORMAL: 1

## 2023-05-30 ASSESSMENT — SLIT LAMP EXAM - LIDS: COMMENTS: NORMAL

## 2023-05-30 ASSESSMENT — TONOMETRY
OS_IOP_MMHG: 14
OD_IOP_MMHG: 15
IOP_METHOD: ICARE

## 2023-05-30 ASSESSMENT — EXTERNAL EXAM - RIGHT EYE: OD_EXAM: NORMAL
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
## 2023-06-01 ENCOUNTER — LAB REQUISITION (OUTPATIENT)
Dept: LAB | Facility: CLINIC | Age: 53
End: 2023-06-01

## 2023-06-01 ENCOUNTER — TELEPHONE (OUTPATIENT)
Dept: OPHTHALMOLOGY | Facility: CLINIC | Age: 53
End: 2023-06-01
Payer: COMMERCIAL

## 2023-06-01 DIAGNOSIS — E03.9 HYPOTHYROIDISM, UNSPECIFIED: ICD-10-CM

## 2023-06-01 DIAGNOSIS — Z86.711 PERSONAL HISTORY OF PULMONARY EMBOLISM: ICD-10-CM

## 2023-06-01 LAB
INR PPP: 4.57 (ref 0.85–1.15)
TSH SERPL DL<=0.005 MIU/L-ACNC: 0.49 UIU/ML (ref 0.3–4.2)

## 2023-06-01 PROCEDURE — 85610 PROTHROMBIN TIME: CPT | Performed by: PHYSICIAN ASSISTANT

## 2023-06-01 PROCEDURE — 84443 ASSAY THYROID STIM HORMONE: CPT | Performed by: PHYSICIAN ASSISTANT

## 2023-06-01 NOTE — TELEPHONE ENCOUNTER
LVM for patient to call to schedule surgery. Please have them call [phone], they have my direct contact info

## 2023-06-02 ENCOUNTER — TELEPHONE (OUTPATIENT)
Dept: OPHTHALMOLOGY | Facility: CLINIC | Age: 53
End: 2023-06-02
Payer: COMMERCIAL

## 2023-06-02 NOTE — TELEPHONE ENCOUNTER
Spoke with patient to schedule surgery with Dr Lynch    Surgery was scheduled on 7/7 at   Patient will have H&P at [facility]     Post-Op visit was scheduled on 7/18  Patient is aware a / is needed day of surgery.   Surgery packet was mailed, patient has my direct contact information for any further questions.

## 2023-06-29 ENCOUNTER — LAB REQUISITION (OUTPATIENT)
Dept: LAB | Facility: CLINIC | Age: 53
End: 2023-06-29

## 2023-06-29 DIAGNOSIS — G93.2 BENIGN INTRACRANIAL HYPERTENSION: ICD-10-CM

## 2023-06-29 PROCEDURE — 80048 BASIC METABOLIC PNL TOTAL CA: CPT | Performed by: PHYSICIAN ASSISTANT

## 2023-06-30 LAB
ANION GAP SERPL CALCULATED.3IONS-SCNC: 12 MMOL/L (ref 7–15)
BUN SERPL-MCNC: 16.1 MG/DL (ref 6–20)
CALCIUM SERPL-MCNC: 8.6 MG/DL (ref 8.6–10)
CHLORIDE SERPL-SCNC: 108 MMOL/L (ref 98–107)
CREAT SERPL-MCNC: 0.78 MG/DL (ref 0.51–0.95)
DEPRECATED HCO3 PLAS-SCNC: 20 MMOL/L (ref 22–29)
GFR SERPL CREATININE-BSD FRML MDRD: >90 ML/MIN/1.73M2
GLUCOSE SERPL-MCNC: 100 MG/DL (ref 70–99)
POTASSIUM SERPL-SCNC: 4.4 MMOL/L (ref 3.4–5.3)
SODIUM SERPL-SCNC: 140 MMOL/L (ref 136–145)

## 2023-07-05 RX ORDER — DEXTROAMPHETAMINE SACCHARATE, AMPHETAMINE ASPARTATE MONOHYDRATE, DEXTROAMPHETAMINE SULFATE AND AMPHETAMINE SULFATE 7.5; 7.5; 7.5; 7.5 MG/1; MG/1; MG/1; MG/1
30 CAPSULE, EXTENDED RELEASE ORAL DAILY
COMMUNITY
Start: 2023-06-01

## 2023-07-05 RX ORDER — PSEUDOEPHEDRINE HCL 30 MG
1 TABLET ORAL DAILY
Status: ON HOLD | COMMUNITY
End: 2023-12-28

## 2023-07-06 ENCOUNTER — ANESTHESIA EVENT (OUTPATIENT)
Dept: SURGERY | Facility: CLINIC | Age: 53
End: 2023-07-06
Payer: COMMERCIAL

## 2023-07-06 NOTE — ANESTHESIA PREPROCEDURE EVALUATION
Anesthesia Pre-Procedure Evaluation    Patient: Anna Nieves   MRN: [id number] : 1970        Procedure : Procedure(s):  left orbital floor and medial wall fracture repair with implant  LEFT ORBITOTOMY, USING OPTICAL TRACKING SYSTEM          Past Medical History:   Diagnosis Date     Deep vein thrombosis (H)      Factor V deficiency (H)      Gastroesophageal reflux disease      Hypothyroidism      Pulmonary embolism (H)      Sleep apnea       No past surgical history on file.   Allergies   Allergen Reactions     Cats Difficulty breathing     Dust Mites Other (See Comments)     Nasal dripping     Mold      Pollen Extract Other (See Comments)     Nasal congestion       Sulfa Antibiotics Hives      Social History     Tobacco Use     Smoking status: Never     Smokeless tobacco: Never   Substance Use Topics     Alcohol use: Yes     Comment: 1-2 times per year      Wt Readings from Last 1 Encounters:   05/15/23 117.6 kg (259 lb 4.2 oz)        Anesthesia Evaluation   Pt has had prior anesthetic.     History of anesthetic complications   Hives with anesthetic as child..    ROS/MED HX  ENT/Pulmonary: Comment: Mild sleep apnea - related per patient to nasal congestion. Patient is clear from a congestion perspective today.    (+) sleep apnea, mild, doesn't use CPAP,     Neurologic: Comment: ADHD  H/o saggital sinus thrombosis  Pseudotumor cerebri      Cardiovascular: Comment: H/o PE      METS/Exercise Tolerance:     Hematologic: Comments: Factor V Leiden    Hgb 12.2    (+) History of blood clots, pt is anticoagulated,     Musculoskeletal:       GI/Hepatic:     (+) GERD,     Renal/Genitourinary: Comment: Cr 0.78      Endo:     (+) thyroid problem, hypothyroidism, Obesity,     Psychiatric/Substance Use:     (+) psychiatric history depression     Infectious Disease:       Malignancy:       Other:            Physical Exam    Airway  airway exam normal      Mallampati: II   TM distance: > 3 FB   Neck ROM: full   Mouth  opening: > 3 cm    Respiratory Devices and Support         Dental       (+) Minor Abnormalities - some fillings, tiny chips      Cardiovascular   cardiovascular exam normal       Rhythm and rate: regular and normal     Pulmonary   pulmonary exam normal                OUTSIDE LABS:  CBC:   Lab Results   Component Value Date    WBC 12.2 (H) 05/15/2023    WBC 13.5 (H) 05/15/2023    HGB 13.0 05/15/2023    HGB 12.6 05/15/2023    HCT 41.4 05/15/2023    HCT 40.3 05/15/2023     05/15/2023     05/15/2023     BMP:   Lab Results   Component Value Date     06/29/2023     05/15/2023    POTASSIUM 4.4 06/29/2023    POTASSIUM 4.0 05/15/2023    CHLORIDE 108 (H) 06/29/2023    CHLORIDE 109 (H) 05/15/2023    CO2 20 (L) 06/29/2023    CO2 22 05/15/2023    BUN 16.1 06/29/2023    BUN 10.6 05/15/2023    CR 0.78 06/29/2023    CR 0.81 05/15/2023     (H) 06/29/2023     (H) 05/15/2023     COAGS:   Lab Results   Component Value Date    PTT 30 05/15/2023    INR 4.57 (H) 06/01/2023     POC: No results found for: BGM, HCG, HCGS  HEPATIC:   Lab Results   Component Value Date    ALBUMIN 4.1 05/15/2023    PROTTOTAL 7.0 05/15/2023    ALT 11 05/15/2023    AST 15 05/15/2023    ALKPHOS 91 05/15/2023    BILITOTAL 0.3 05/15/2023     OTHER:   Lab Results   Component Value Date    JESSICA 8.6 06/29/2023    LIPASE 44 03/13/2023    TSH 0.49 06/01/2023    T4 0.88 02/16/2022    CRP 0.6 06/17/2022    SED 15 06/17/2022       Anesthesia Plan    ASA Status:  2   NPO Status:  NPO Appropriate    Anesthesia Type: General.     - Airway: ETT   Induction: Propofol.      Techniques and Equipment:     - Airway: Video-Laryngoscope         Consents    Anesthesia Plan(s) and associated risks, benefits, and realistic alternatives discussed. Questions answered and patient/representative(s) expressed understanding.    - Discussed:     - Discussed with:  Patient         Postoperative Care    Pain management: Multi-modal analgesia.   PONV  prophylaxis: Ondansetron (or other 5HT-3), Background Propofol Infusion, Dexamethasone or Solumedrol     Comments:                Isaac London MD

## 2023-07-07 ENCOUNTER — HOSPITAL ENCOUNTER (OUTPATIENT)
Facility: CLINIC | Age: 53
Discharge: HOME HEALTH | End: 2023-07-07
Attending: OPHTHALMOLOGY | Admitting: OPHTHALMOLOGY
Payer: COMMERCIAL

## 2023-07-07 ENCOUNTER — ANESTHESIA (OUTPATIENT)
Dept: SURGERY | Facility: CLINIC | Age: 53
End: 2023-07-07
Payer: COMMERCIAL

## 2023-07-07 VITALS
HEART RATE: 87 BPM | BODY MASS INDEX: 42.75 KG/M2 | RESPIRATION RATE: 12 BRPM | DIASTOLIC BLOOD PRESSURE: 83 MMHG | TEMPERATURE: 97.6 F | HEIGHT: 64 IN | OXYGEN SATURATION: 96 % | WEIGHT: 250.4 LBS | SYSTOLIC BLOOD PRESSURE: 140 MMHG

## 2023-07-07 DIAGNOSIS — S02.30XA CLOSED FRACTURE OF ORBITAL FLOOR, UNSPECIFIED LATERALITY, INITIAL ENCOUNTER (H): Primary | ICD-10-CM

## 2023-07-07 LAB — INR PPP: 1.66 (ref 0.85–1.15)

## 2023-07-07 PROCEDURE — 250N000009 HC RX 250

## 2023-07-07 PROCEDURE — 250N000011 HC RX IP 250 OP 636: Performed by: ANESTHESIOLOGY

## 2023-07-07 PROCEDURE — 36415 COLL VENOUS BLD VENIPUNCTURE: CPT | Performed by: ANESTHESIOLOGY

## 2023-07-07 PROCEDURE — 272N000001 HC OR GENERAL SUPPLY STERILE: Performed by: OPHTHALMOLOGY

## 2023-07-07 PROCEDURE — 250N000025 HC SEVOFLURANE, PER MIN: Performed by: OPHTHALMOLOGY

## 2023-07-07 PROCEDURE — 710N000009 HC RECOVERY PHASE 1, LEVEL 1, PER MIN: Performed by: OPHTHALMOLOGY

## 2023-07-07 PROCEDURE — 250N000009 HC RX 250: Performed by: ANESTHESIOLOGY

## 2023-07-07 PROCEDURE — 21390 OPN TX ORBIT PERIORBTL IMPLT: CPT | Mod: LT | Performed by: OPHTHALMOLOGY

## 2023-07-07 PROCEDURE — 250N000011 HC RX IP 250 OP 636: Mod: JZ

## 2023-07-07 PROCEDURE — 370N000017 HC ANESTHESIA TECHNICAL FEE, PER MIN: Performed by: OPHTHALMOLOGY

## 2023-07-07 PROCEDURE — 258N000003 HC RX IP 258 OP 636: Performed by: ANESTHESIOLOGY

## 2023-07-07 PROCEDURE — 360N000077 HC SURGERY LEVEL 4, PER MIN: Performed by: OPHTHALMOLOGY

## 2023-07-07 PROCEDURE — C1713 ANCHOR/SCREW BN/BN,TIS/BN: HCPCS | Performed by: OPHTHALMOLOGY

## 2023-07-07 PROCEDURE — 250N000013 HC RX MED GY IP 250 OP 250 PS 637: Performed by: OPHTHALMOLOGY

## 2023-07-07 PROCEDURE — 250N000011 HC RX IP 250 OP 636: Performed by: STUDENT IN AN ORGANIZED HEALTH CARE EDUCATION/TRAINING PROGRAM

## 2023-07-07 PROCEDURE — 250N000011 HC RX IP 250 OP 636: Performed by: NURSE ANESTHETIST, CERTIFIED REGISTERED

## 2023-07-07 PROCEDURE — 85610 PROTHROMBIN TIME: CPT | Performed by: ANESTHESIOLOGY

## 2023-07-07 PROCEDURE — 250N000011 HC RX IP 250 OP 636: Performed by: OPHTHALMOLOGY

## 2023-07-07 PROCEDURE — 250N000011 HC RX IP 250 OP 636: Mod: JZ | Performed by: ANESTHESIOLOGY

## 2023-07-07 PROCEDURE — 61782 SCAN PROC CRANIAL EXTRA: CPT | Performed by: OPHTHALMOLOGY

## 2023-07-07 PROCEDURE — 250N000009 HC RX 250: Performed by: OPHTHALMOLOGY

## 2023-07-07 PROCEDURE — 999N000141 HC STATISTIC PRE-PROCEDURE NURSING ASSESSMENT: Performed by: OPHTHALMOLOGY

## 2023-07-07 PROCEDURE — 710N000012 HC RECOVERY PHASE 2, PER MINUTE: Performed by: OPHTHALMOLOGY

## 2023-07-07 DEVICE — IMPLANTABLE DEVICE: Type: IMPLANTABLE DEVICE | Site: EYE | Status: FUNCTIONAL

## 2023-07-07 RX ORDER — ONDANSETRON 4 MG/1
4 TABLET, ORALLY DISINTEGRATING ORAL EVERY 30 MIN PRN
Status: DISCONTINUED | OUTPATIENT
Start: 2023-07-07 | End: 2023-07-07 | Stop reason: HOSPADM

## 2023-07-07 RX ORDER — NEOMYCIN POLYMYXIN B SULFATES AND DEXAMETHASONE 3.5; 10000; 1 MG/ML; [USP'U]/ML; MG/ML
SUSPENSION/ DROPS OPHTHALMIC
Qty: 5 ML | Refills: 0 | Status: SHIPPED | OUTPATIENT
Start: 2023-07-07 | End: 2023-07-17

## 2023-07-07 RX ORDER — CEFAZOLIN SODIUM/WATER 2 G/20 ML
2 SYRINGE (ML) INTRAVENOUS
Status: COMPLETED | OUTPATIENT
Start: 2023-07-07 | End: 2023-07-07

## 2023-07-07 RX ORDER — SODIUM CHLORIDE, SODIUM LACTATE, POTASSIUM CHLORIDE, CALCIUM CHLORIDE 600; 310; 30; 20 MG/100ML; MG/100ML; MG/100ML; MG/100ML
INJECTION, SOLUTION INTRAVENOUS CONTINUOUS PRN
Status: DISCONTINUED | OUTPATIENT
Start: 2023-07-07 | End: 2023-07-07

## 2023-07-07 RX ORDER — ERYTHROMYCIN 5 MG/G
OINTMENT OPHTHALMIC PRN
Status: DISCONTINUED | OUTPATIENT
Start: 2023-07-07 | End: 2023-07-07 | Stop reason: HOSPADM

## 2023-07-07 RX ORDER — FENTANYL CITRATE 50 UG/ML
25 INJECTION, SOLUTION INTRAMUSCULAR; INTRAVENOUS EVERY 5 MIN PRN
Status: DISCONTINUED | OUTPATIENT
Start: 2023-07-07 | End: 2023-07-07 | Stop reason: HOSPADM

## 2023-07-07 RX ORDER — OXYCODONE HYDROCHLORIDE 5 MG/1
5 TABLET ORAL EVERY 6 HOURS PRN
Qty: 12 TABLET | Refills: 0 | Status: SHIPPED | OUTPATIENT
Start: 2023-07-07 | End: 2023-07-10

## 2023-07-07 RX ORDER — BUPIVACAINE HYDROCHLORIDE 5 MG/ML
INJECTION, SOLUTION EPIDURAL; INTRACAUDAL
Status: DISCONTINUED
Start: 2023-07-07 | End: 2023-07-07 | Stop reason: HOSPADM

## 2023-07-07 RX ORDER — HYDROMORPHONE HCL IN WATER/PF 6 MG/30 ML
0.4 PATIENT CONTROLLED ANALGESIA SYRINGE INTRAVENOUS EVERY 5 MIN PRN
Status: DISCONTINUED | OUTPATIENT
Start: 2023-07-07 | End: 2023-07-07 | Stop reason: HOSPADM

## 2023-07-07 RX ORDER — TETRACAINE HYDROCHLORIDE 5 MG/ML
SOLUTION OPHTHALMIC
Status: DISCONTINUED
Start: 2023-07-07 | End: 2023-07-07 | Stop reason: HOSPADM

## 2023-07-07 RX ORDER — FENTANYL CITRATE 50 UG/ML
50 INJECTION, SOLUTION INTRAMUSCULAR; INTRAVENOUS EVERY 5 MIN PRN
Status: DISCONTINUED | OUTPATIENT
Start: 2023-07-07 | End: 2023-07-07 | Stop reason: HOSPADM

## 2023-07-07 RX ORDER — FENTANYL CITRATE 50 UG/ML
INJECTION, SOLUTION INTRAMUSCULAR; INTRAVENOUS PRN
Status: DISCONTINUED | OUTPATIENT
Start: 2023-07-07 | End: 2023-07-07

## 2023-07-07 RX ORDER — ONDANSETRON 2 MG/ML
INJECTION INTRAMUSCULAR; INTRAVENOUS PRN
Status: DISCONTINUED | OUTPATIENT
Start: 2023-07-07 | End: 2023-07-07

## 2023-07-07 RX ORDER — HYDROMORPHONE HCL IN WATER/PF 6 MG/30 ML
0.2 PATIENT CONTROLLED ANALGESIA SYRINGE INTRAVENOUS EVERY 5 MIN PRN
Status: DISCONTINUED | OUTPATIENT
Start: 2023-07-07 | End: 2023-07-07 | Stop reason: HOSPADM

## 2023-07-07 RX ORDER — DEXAMETHASONE SODIUM PHOSPHATE 4 MG/ML
INJECTION, SOLUTION INTRA-ARTICULAR; INTRALESIONAL; INTRAMUSCULAR; INTRAVENOUS; SOFT TISSUE PRN
Status: DISCONTINUED | OUTPATIENT
Start: 2023-07-07 | End: 2023-07-07

## 2023-07-07 RX ORDER — CEFAZOLIN SODIUM/WATER 2 G/20 ML
2 SYRINGE (ML) INTRAVENOUS SEE ADMIN INSTRUCTIONS
Status: DISCONTINUED | OUTPATIENT
Start: 2023-07-07 | End: 2023-07-07 | Stop reason: HOSPADM

## 2023-07-07 RX ORDER — CEPHALEXIN 500 MG/1
500 CAPSULE ORAL 2 TIMES DAILY
Qty: 10 CAPSULE | Refills: 0 | Status: SHIPPED | OUTPATIENT
Start: 2023-07-07 | End: 2023-07-12

## 2023-07-07 RX ORDER — OXYCODONE HYDROCHLORIDE 5 MG/1
5 TABLET ORAL ONCE
Status: COMPLETED | OUTPATIENT
Start: 2023-07-07 | End: 2023-07-07

## 2023-07-07 RX ORDER — LIDOCAINE HYDROCHLORIDE 20 MG/ML
INJECTION, SOLUTION INFILTRATION; PERINEURAL PRN
Status: DISCONTINUED | OUTPATIENT
Start: 2023-07-07 | End: 2023-07-07

## 2023-07-07 RX ORDER — ONDANSETRON 2 MG/ML
4 INJECTION INTRAMUSCULAR; INTRAVENOUS EVERY 30 MIN PRN
Status: DISCONTINUED | OUTPATIENT
Start: 2023-07-07 | End: 2023-07-07 | Stop reason: HOSPADM

## 2023-07-07 RX ORDER — PROPOFOL 10 MG/ML
INJECTION, EMULSION INTRAVENOUS PRN
Status: DISCONTINUED | OUTPATIENT
Start: 2023-07-07 | End: 2023-07-07

## 2023-07-07 RX ORDER — ERYTHROMYCIN 5 MG/G
OINTMENT OPHTHALMIC
Status: DISCONTINUED
Start: 2023-07-07 | End: 2023-07-07 | Stop reason: HOSPADM

## 2023-07-07 RX ORDER — SODIUM CHLORIDE, SODIUM LACTATE, POTASSIUM CHLORIDE, CALCIUM CHLORIDE 600; 310; 30; 20 MG/100ML; MG/100ML; MG/100ML; MG/100ML
INJECTION, SOLUTION INTRAVENOUS CONTINUOUS
Status: DISCONTINUED | OUTPATIENT
Start: 2023-07-07 | End: 2023-07-07 | Stop reason: HOSPADM

## 2023-07-07 RX ORDER — DEXMEDETOMIDINE HYDROCHLORIDE 4 UG/ML
INJECTION, SOLUTION INTRAVENOUS PRN
Status: DISCONTINUED | OUTPATIENT
Start: 2023-07-07 | End: 2023-07-07

## 2023-07-07 RX ORDER — PROPOFOL 10 MG/ML
INJECTION, EMULSION INTRAVENOUS CONTINUOUS PRN
Status: DISCONTINUED | OUTPATIENT
Start: 2023-07-07 | End: 2023-07-07

## 2023-07-07 RX ADMIN — PROPOFOL 30 MCG/KG/MIN: 10 INJECTION, EMULSION INTRAVENOUS at 14:00

## 2023-07-07 RX ADMIN — HYDROMORPHONE HYDROCHLORIDE 0.5 MG: 1 INJECTION, SOLUTION INTRAMUSCULAR; INTRAVENOUS; SUBCUTANEOUS at 14:16

## 2023-07-07 RX ADMIN — SUGAMMADEX 300 MG: 100 INJECTION, SOLUTION INTRAVENOUS at 14:53

## 2023-07-07 RX ADMIN — ONDANSETRON 4 MG: 2 INJECTION INTRAMUSCULAR; INTRAVENOUS at 14:46

## 2023-07-07 RX ADMIN — ROCURONIUM BROMIDE 20 MG: 50 INJECTION, SOLUTION INTRAVENOUS at 14:15

## 2023-07-07 RX ADMIN — DEXAMETHASONE SODIUM PHOSPHATE 4 MG: 4 INJECTION, SOLUTION INTRA-ARTICULAR; INTRALESIONAL; INTRAMUSCULAR; INTRAVENOUS; SOFT TISSUE at 13:59

## 2023-07-07 RX ADMIN — DEXMEDETOMIDINE HYDROCHLORIDE 8 MCG: 200 INJECTION INTRAVENOUS at 14:16

## 2023-07-07 RX ADMIN — DEXMEDETOMIDINE HYDROCHLORIDE 12 MCG: 200 INJECTION INTRAVENOUS at 14:09

## 2023-07-07 RX ADMIN — ONDANSETRON 4 MG: 2 INJECTION INTRAMUSCULAR; INTRAVENOUS at 16:45

## 2023-07-07 RX ADMIN — FENTANYL CITRATE 50 MCG: 50 INJECTION, SOLUTION INTRAMUSCULAR; INTRAVENOUS at 14:11

## 2023-07-07 RX ADMIN — SODIUM CHLORIDE, POTASSIUM CHLORIDE, SODIUM LACTATE AND CALCIUM CHLORIDE: 600; 310; 30; 20 INJECTION, SOLUTION INTRAVENOUS at 14:53

## 2023-07-07 RX ADMIN — LIDOCAINE HYDROCHLORIDE 80 MG: 20 INJECTION, SOLUTION INFILTRATION; PERINEURAL at 13:50

## 2023-07-07 RX ADMIN — SODIUM CHLORIDE, POTASSIUM CHLORIDE, SODIUM LACTATE AND CALCIUM CHLORIDE: 600; 310; 30; 20 INJECTION, SOLUTION INTRAVENOUS at 13:43

## 2023-07-07 RX ADMIN — OXYCODONE HYDROCHLORIDE 5 MG: 5 TABLET ORAL at 15:56

## 2023-07-07 RX ADMIN — PROPOFOL 200 MG: 10 INJECTION, EMULSION INTRAVENOUS at 13:50

## 2023-07-07 RX ADMIN — Medication 2 G: at 13:43

## 2023-07-07 RX ADMIN — FENTANYL CITRATE 50 MCG: 50 INJECTION, SOLUTION INTRAMUSCULAR; INTRAVENOUS at 13:50

## 2023-07-07 RX ADMIN — MIDAZOLAM 2 MG: 1 INJECTION INTRAMUSCULAR; INTRAVENOUS at 13:43

## 2023-07-07 RX ADMIN — ROCURONIUM BROMIDE 30 MG: 50 INJECTION, SOLUTION INTRAVENOUS at 13:51

## 2023-07-07 ASSESSMENT — ACTIVITIES OF DAILY LIVING (ADL)
ADLS_ACUITY_SCORE: 35

## 2023-07-07 NOTE — ANESTHESIA CARE TRANSFER NOTE
Patient: Anna Nieves    Procedure: Procedure(s):  left orbital floor and medial wall fracture repair with implant  LEFT ORBITOTOMY, USING OPTICAL TRACKING SYSTEM       Diagnosis: Closed fracture of orbital floor, unspecified laterality, initial encounter (H) [S02.30XA]  Diagnosis Additional Information: No value filed.    Anesthesia Type:   General     Note:    Oropharynx: oropharynx clear of all foreign objects and spontaneously breathing  Level of Consciousness: awake  Oxygen Supplementation: face mask  Level of Supplemental Oxygen (L/min / FiO2): 6  Independent Airway: airway patency satisfactory and stable  Dentition: dentition unchanged  Vital Signs Stable: post-procedure vital signs reviewed and stable  Report to RN Given: handoff report given  Patient transferred to: PACU    Handoff Report: Identifed the Patient, Identified the Reponsible Provider, Reviewed the pertinent medical history, Discussed the surgical course, Reviewed Intra-OP anesthesia mangement and issues during anesthesia, Set expectations for post-procedure period and Allowed opportunity for questions and acknowledgement of understanding      Vitals:  Vitals Value Taken Time   /74 07/07/23 1503   Temp     Pulse 105 07/07/23 1509   Resp 22 07/07/23 1509   SpO2 95 % 07/07/23 1509   Vitals shown include unvalidated device data.    Electronically Signed By: BERONICA Castaneda CRNA  July 7, 2023  3:10 PM

## 2023-07-07 NOTE — DISCHARGE INSTRUCTIONS
Same Day Surgery Discharge Instructions for  Sedation and General Anesthesia     It's not unusual to feel dizzy, light-headed or faint for up to 24 hours after surgery or while taking pain medication.  If you have these symptoms: sit for a few minutes before standing and have someone assist you when you get up to walk or use the bathroom.    You should rest and relax for the next 24 hours. We recommend you make arrangements to have an adult stay with you for at least 24 hours after your discharge.  Avoid hazardous and strenuous activity.    DO NOT DRIVE any vehicle or operate mechanical equipment for 24 hours following the end of your surgery.  Even though you may feel normal, your reactions may be affected by the medication you have received.    Do not drink alcoholic beverages for 24 hours following surgery.     Slowly progress to your regular diet as you feel able. It's not unusual to feel nauseated and/or vomit after receiving anesthesia.  If you develop these symptoms, drink clear liquids (apple juice, ginger ale, broth, 7-up, etc. ) until you feel better.  If your nausea and vomiting persists for 24 hours, please notify your surgeon.      All narcotic pain medications, along with inactivity and anesthesia, can cause constipation. Drinking plenty of liquids and increasing fiber intake will help.    For any questions of a medical nature, call your surgeon.    Do not make important decisions for 24 hours.    If you had general anesthesia, you may have a sore throat for a couple of days related to the breathing tube used during surgery.  You may use Cepacol lozenges to help with this discomfort.  If it worsens or if you develop a fever, contact your surgeon.     If you feel your pain is not well managed with the pain medications prescribed by your surgeon, please contact your surgeon's office to let them know so they can address your concerns.                   **If you have questions or concerns about your  procedure,   call Dr. Sheppard at [phone] [facility] and [phone] [place]**            May resume warfarin 7/8/2023    [facility]   Eyelid/Orbital Surgery Discharge Instructions   Fina Pink M.D.     ICE COMPRESSES  Immediately following surgery, you should begin to apply ice compresses.  Apply a cold gel pack, which can be purchased at your drug store, or wrap a clean washcloth around a cup of crushed ice in a plastic bag (a bag of frozen peas also works well) and hold the cold compresses directly against the closed eyelid (s). Do this at least six times per day for 15 minutes, but not while sleeping.  Continue cold compresses for the first two days after surgery, or longer if swelling persists.           ACTIVITY  Avoid heavy lifting or vigorous exercise for one week after surgery.  You may resume regular activities as tolerated.  You may shower and wash your hair on the day after surgery; be careful to avoid getting shampoo in your eyes.     MEDICATION  If the doctor has given you some medications to take after surgery, please take these according to the instructions on the bottle.  Pain medications may make you drowsy so do not drive, operate heavy machinery, or use alcohol while taking it.  When you feel that you do not need the prescription pain medication, you may substitute Extra Strength Tylenol for mild pain.    WHAT TO EXPECT  You should expect some slight oozing of blood from the incision site over the first two to three days after surgery.  Swelling and bruising will occur for one to two weeks or longer.  You may also experience itching and tearing during the first several weeks after surgery.  This is part of the normal healing process    QUESTIONS  Please feel free to contact the office, should you have any questions that are not answered above.  The phone number is [phone].  Please call immediately if you are unable to establish vision in the operative eye,  or if you are experiencing heavy bleeding that will not stop with gentle pressure.

## 2023-07-07 NOTE — OP NOTE
PREOPERATIVE DIAGNOSIS: Left eye blowout orbital floor, and medial wall fracture.     POSTOPERATIVE DIAGNOSIS: Left eye blowout orbital floor, and medial wall fracture.     PROCEDURE: Left orbital exploration with repair of orbital floor and medial wall fracture with Warren-Por porus polyethylene implant. Intraoperative navigation.    SURGEON: Fina Sheppard MD     ANESTHESIA: General with local infiltration of a 50/50 mixture of 2% lidocaine with epinephrine and 0.5% Marcaine.     COMPLICATIONS: None.     ESTIMATED BLOOD LOSS: Less than 5 mL.     HISTORY: Anna Nieves  presented with a left orbital blowout fracture as well  with enophthalmos and diplopia. After the risks, benefits and alternatives to the proposed procedure were explained, informed consent was obtained.     DESCRIPTION OF PROCEDURE: Anna Nieves  was brought to the operating room and placed supine on the operating table. Under general anesthesia, the left lateral canthus and lower lid were infiltrated with local anesthetic.  The area was prepped and draped in the typical sterile ophthalmic fashion. Her CT scan had been loaded into the [employer] system. Registration was performed and accuracy was verified. Attention was directed to the left side. A transconjunctival incision was made with monopolar cautery in the inferior conjunctival fornix. This dissection was carried down to the orbital rim. [place] elevator was used to elevate the periorbita from the orbital floor. The fracture site was delineated for 360 degrees identifying the bony edges with the freer elevator. The orbital floor aspect of the fracture was completely exposed. The infraorbital nerve was identified as was the inferior oblique and both were preserved. Attention was directed to the medial wall fracture. The conjunctival fornix incision was extended into the caruncle and dissection was carried to the posterior lacrimal crest. Monopolar cautery was used to incise  the periosteum and the medial aspect of the fracture was also exposed in a subperiosteal plane. All of the orbital soft tissue was repositioned into the orbit. A small Warren-Por wrap around implant was selected to address both the blow out orbital floor fracture as well as the medial wall fracture. It was trimmed to size. A small notch was cut to accommodate the infraorbital nerve. The implant was soaked in gentamicin solution and then cut to fit over the fracture site and placed in the subperiosteal space covering the floor defect as well as the medial wall defect. The navigation system was used to verify proper positioning of the implant. The conjunctiva was reset. Forced ductions were checked and found to be normal. The are was irrigated, and conjunctiva was closed with interrupted 6-0 plain gut. Antibiotic ophthalmic ointment was applied to the eye. The patient tolerated the procedure well. Anna DONOHUE Stephenrefugio was awoken from general anesthesia and  left the operating room in stable condition.     Fina Sheppard MD

## 2023-07-07 NOTE — ANESTHESIA POSTPROCEDURE EVALUATION
Patient: Anna Nieves    Procedure: Procedure(s):  left orbital floor and medial wall fracture repair with implant  LEFT ORBITOTOMY, USING OPTICAL TRACKING SYSTEM       Anesthesia Type:  General    Note:  Disposition: Outpatient   Postop Pain Control: Uneventful            Sign Out: Well controlled pain   PONV: No   Neuro/Psych: Uneventful            Sign Out: Acceptable/Baseline neuro status   Airway/Respiratory: Uneventful            Sign Out: Acceptable/Baseline resp. status   CV/Hemodynamics: Uneventful            Sign Out: Acceptable CV status; No obvious hypovolemia; No obvious fluid overload   Other NRE: NONE   DID A NON-ROUTINE EVENT OCCUR? No    Event details/Postop Comments:  Please call with questions or concerns.           Last vitals:  Vitals Value Taken Time   /83 07/07/23 1725   Temp 36.3  C (97.3  F) 07/07/23 1630   Pulse 79 07/07/23 1725   Resp 7 07/07/23 1637   SpO2 94 % 07/07/23 1636   Vitals shown include unvalidated device data.    Electronically Signed By: Isaac London MD  July 7, 2023  5:28 PM

## 2023-07-07 NOTE — OR NURSING
Patient states there is no chance she could be pregnant. Understands she is receiving anesthesia today.

## 2023-07-07 NOTE — ANESTHESIA PROCEDURE NOTES
Airway       Patient location during procedure: OR       Procedure Start/Stop Times: 7/7/2023 1:53 PM  Staff -        CRNA: Yuridia Burger APRN CRNA       Performed By: CRNA  Consent for Airway        Urgency: elective  Indications and Patient Condition       Indications for airway management: chandana-procedural       Induction type:intravenous       Mask difficulty assessment: 1 - vent by mask    Final Airway Details       Final airway type: endotracheal airway       Successful airway: ETT - single  Endotracheal Airway Details        ETT size (mm): 7.0       Cuffed: yes       Successful intubation technique: video laryngoscopy       VL Blade Size: Glidescope 3       Grade View of Cords: 1       Adjucts: stylet       Position: Left       Measured from: gums/teeth       Secured at (cm): 21       Bite block used: None    Post intubation assessment        Placement verified by: capnometry, equal breath sounds and chest rise        Number of attempts at approach: 1       Number of other approaches attempted: 0       Secured with: pink tape       Ease of procedure: easy       Dentition: Intact and Unchanged    Medication(s) Administered   Medication Administration Time: 7/7/2023 1:53 PM

## 2023-07-08 ENCOUNTER — TELEPHONE (OUTPATIENT)
Dept: OPHTHALMOLOGY | Facility: CLINIC | Age: 53
End: 2023-07-08
Payer: COMMERCIAL

## 2023-07-08 NOTE — TELEPHONE ENCOUNTER
Pt called regarding overnight crusting of the eyelids/lashes. Was wondering if it would be ok to use a warm, moist rag to clean off. Assured the patient that this would be fine if the eye was closed but to try to avoid getting water into the eye itself.

## 2023-07-10 ENCOUNTER — TELEPHONE (OUTPATIENT)
Dept: OPHTHALMOLOGY | Facility: CLINIC | Age: 53
End: 2023-07-10
Payer: COMMERCIAL

## 2023-07-10 NOTE — TELEPHONE ENCOUNTER
[facility] Call Center    Phone Message    May a detailed message be left on voicemail: yes     Reason for Call: Other: Patient had surgery on 7/7 with Dr Sheppard and last night patient noticed a hard spot on her right hand where we placed the IV in. Patient states it feels hard about 2 inches long. Not red and painful unless patient stretches and touches it. Patient is concerned it might be a blood clott and would like a call back to advise if this is normal. Patient understand she may need to see another provider in a different department but would like to run it by us first. Please call patient back [phone]. Thank you.     Action Taken: Message routed to:  [facility] ([facility] Eye    Travel Screening: Not Applicable

## 2023-07-18 ENCOUNTER — OFFICE VISIT (OUTPATIENT)
Dept: OPHTHALMOLOGY | Facility: CLINIC | Age: 53
End: 2023-07-18
Payer: COMMERCIAL

## 2023-07-18 DIAGNOSIS — S02.30XA CLOSED FRACTURE OF ORBITAL FLOOR, UNSPECIFIED LATERALITY, INITIAL ENCOUNTER (H): Primary | ICD-10-CM

## 2023-07-18 PROCEDURE — 99024 POSTOP FOLLOW-UP VISIT: CPT | Performed by: OPHTHALMOLOGY

## 2023-07-18 ASSESSMENT — VISUAL ACUITY
OD_CC: 20/25
METHOD: SNELLEN - LINEAR
OS_CC+: -2
OD_CC+: -2
OS_CC: 20/20
CORRECTION_TYPE: GLASSES

## 2023-07-18 ASSESSMENT — CONF VISUAL FIELD
OS_INFERIOR_NASAL_RESTRICTION: 0
OD_NORMAL: 1
OS_NORMAL: 1
OD_INFERIOR_TEMPORAL_RESTRICTION: 0
OS_INFERIOR_TEMPORAL_RESTRICTION: 0
OD_SUPERIOR_TEMPORAL_RESTRICTION: 0
OD_SUPERIOR_NASAL_RESTRICTION: 0
OS_SUPERIOR_NASAL_RESTRICTION: 0
OD_INFERIOR_NASAL_RESTRICTION: 0
METHOD: COUNTING FINGERS
OS_SUPERIOR_TEMPORAL_RESTRICTION: 0

## 2023-07-18 ASSESSMENT — TONOMETRY
OS_IOP_MMHG: 10
IOP_METHOD: ICARE
OD_IOP_MMHG: 10

## 2023-07-18 NOTE — PATIENT INSTRUCTIONS
Use artificial tears 2-4 x daily  Use warm compresses three times a day for the next month  Gently massage your lower lid up wards several times daily  Use saline nasal spray, avoid aggressive nose blowing.

## 2023-07-18 NOTE — NURSING NOTE
Chief Complaints and History of Present Illnesses   Patient presents with     Follow Up For Surgery Of Eye     Chief Complaint(s) and History of Present Illness(es)     Follow Up For Surgery Of Eye            Laterality: left eye    Associated symptoms: Negative for eye pain, redness, flashes and floaters          Comments    Patient states no pain but numbness left eye. Patient states vision was great initially but since returning to work seems to have decreased. Patient states she feels something in left side of nose and cannot breathe well through that side. Patient states nerve pain in her lip.  Angelica Hurley, FELY July 18, 2023 8:08 AM

## 2023-07-18 NOTE — PROGRESS NOTES
Chief Complaint(s) and History of Present Illness(es)     Follow Up For Surgery Of Eye            Laterality: left eye    Associated symptoms: Negative for eye pain, redness, flashes and floaters            Comments    Patient states no pain but numbness left eye. Patient states vision was   great initially but since returning to work seems to have decreased.   Patient states she feels something in left side of nose and cannot breathe   well through that side. Patient states nerve pain in her lip.  Angelica Hurley, COA July 18, 2023 8:08 AM       Doing well. Improved up gaze, now very rare diplopia outside. She does have an XP in upgaze in clinic but no vertical component I can see.     V2 numb, was moderately numb before and now worse, but feels like it is coming back.      Patient Instructions   Use artificial tears 2-4 x daily  Use warm compresses three times a day for the next month  Gently massage your lower lid up wards several times daily  Use saline nasal spray, avoid aggressive nose blowing.     Return in about 2 months (around 9/18/2023) for Follow up orbital fracture repair .      Attending Physician Attestation: Complete documentation of historical and exam elements from today's encounter can be found in the full encounter summary report (not reduplicated in this progress note). I personally obtained the chief complaint(s) and history of present illness. I confirmed and edited as necessary the review of systems, past medical/surgical history, family history, social history, and examination findings as documented by others; and I examined the patient myself. I personally reviewed the relevant tests, images, and reports as documented above. I formulated and edited as necessary the assessment and plan and discussed the findings and management plan with the patient and family. I personally reviewed the ophthalmic test(s) associated with this encounter, agree with the interpretation(s) as documented by the  resident/fellow, and have edited the corresponding report(s) as necessary. Fina Sheppard MD

## 2023-09-19 ENCOUNTER — OFFICE VISIT (OUTPATIENT)
Dept: OPHTHALMOLOGY | Facility: CLINIC | Age: 53
End: 2023-09-19
Payer: COMMERCIAL

## 2023-09-19 DIAGNOSIS — S02.30XA CLOSED FRACTURE OF ORBITAL FLOOR, UNSPECIFIED LATERALITY, INITIAL ENCOUNTER (H): ICD-10-CM

## 2023-09-19 DIAGNOSIS — Z98.890 POSTOPERATIVE EYE STATE: Primary | ICD-10-CM

## 2023-09-19 PROCEDURE — 99024 POSTOP FOLLOW-UP VISIT: CPT | Mod: GC | Performed by: OPHTHALMOLOGY

## 2023-09-19 ASSESSMENT — CONF VISUAL FIELD
OD_NORMAL: 1
OS_INFERIOR_TEMPORAL_RESTRICTION: 0
OS_SUPERIOR_NASAL_RESTRICTION: 0
OD_INFERIOR_TEMPORAL_RESTRICTION: 0
OS_NORMAL: 1
OS_INFERIOR_NASAL_RESTRICTION: 0
OS_SUPERIOR_TEMPORAL_RESTRICTION: 0
OD_SUPERIOR_TEMPORAL_RESTRICTION: 0
OD_INFERIOR_NASAL_RESTRICTION: 0
OD_SUPERIOR_NASAL_RESTRICTION: 0
METHOD: COUNTING FINGERS

## 2023-09-19 ASSESSMENT — VISUAL ACUITY
CORRECTION_TYPE: GLASSES
OD_CC: 20/30
OD_CC+: -1
METHOD: SNELLEN - LINEAR

## 2023-09-19 ASSESSMENT — SLIT LAMP EXAM - LIDS
COMMENTS: NORMAL
COMMENTS: NORMAL

## 2023-09-19 ASSESSMENT — EXTERNAL EXAM - RIGHT EYE: OD_EXAM: NORMAL

## 2023-09-19 NOTE — NURSING NOTE
"Chief Complaints and History of Present Illnesses   Patient presents with    Post Op (Ophthalmology) Left Eye     Pt here for POM# 2.5 s/p Left orbital exploration with repair of orbital floor with medial wall fracture with Warren-Por Porus polyethylene implant. DOS 07/07/2023.     Chief Complaint(s) and History of Present Illness(es)       Post Op (Ophthalmology) Left Eye              Laterality: left eye    Comments: Pt here for POM# 2.5 s/p Left orbital exploration with repair of orbital floor with medial wall fracture with Warren-Por Porus polyethylene implant. DOS 07/07/2023.              Comments    Pt states everything is going well. The area that had numbing is starting to come back but is not \"the right location, like if I touch my lip my tooth hurts.\"     Zahraa Muñoz, COA on 9/19/2023 at 10:53 AM                     "

## 2023-09-29 NOTE — PROGRESS NOTES
In person evaluation    [facility]  2/26/2020, in person visit  8/11/2021, in person visit  1/25/2023, in person visit  10/2/2023, in person visit      53-year-old followed neurologically for:  Sagittal sinus venous thrombosis 1994  Heterozygous factor V gene mutation  Obstructive sleep apnea  Pseudotumor cerebri with increased ICP and visual changes in the past    Since last seen 1/25/2023  Patient has an autistic child that accidentally hit her by the left eye and caused a blowout fracture by the left eye  She had diplopia and numbness in the V2 distribution  She also had some vertigo probably from the diplopia  Some dysesthesia felt like itching in the ear canal    7/7/2023 left orbital floor medial wall fracture repair with implant  Has residual diplopia looking up and to the left but tolerates it  Numbness on the left side of the face is getting to be less  She has some tactile sensation temperature appreciation in V2  There is 2 teeth that are numb upper left side      Pseudotumor cerebri under good control  No enlargement of the blind spot  Headaches very variable  When she has a flurry she takes the increased rescue dose of Diamox  Otherwise is on Diamox 250 mg tablet, 2 at nighttime    Feels the pseudotumor is stable          A.  Pseudotumor cerebri        Diagnosed 1994 with sagittal sinus venous thrombosis        Has had some mild cataracts in the past, followed by ophthalmology        Has seen Dr. Julio C Hernandez of ophthalmology    Headaches                          1/2023      10/2023  Frequency       3/week       2-3/month  Duration           4 hours      4 plus hours  Severity            mild          mild to moderate      Tylenol and a sinus medicine 1.5 tabs per week    No sign of withdrawal headache    Does take an extra Diamox occasionally  Otherwise is on 2 tabs at night  Medications written as 250 mg Diamox 1 tab 3 times daily  This way she has a rescue          B. Sagittal venous thrombus in 1994.        Heterozygous factor V gene mutation.       Patient on blood thinners per primary MD    C.  Obstructive sleep apnea       History of obstructive sleep apnea, uses the CPAP machine.        Working with a new sleep person is using the CPAP machine currently might have some narcoleptic type symptomatology      D. Pre-existing ADD in the past    E.  May 2023 blowout fracture left orbit       Patient has an autistic child that accidentally hit her by the left eye and caused a blowout fracture by the left eye       She had diplopia and numbness in the V2 distribution       She also had some vertigo probably from the diplopia       Some dysesthesia felt like itching in the ear canal         7/7/2023 left orbital floor medial wall fracture repair with implant       Has residual diplopia looking up and to the left but tolerates it       Numbness on the left side of the face is getting to be less       She has some tactile sensation temperature appreciation in V2       There is 2 teeth that are numb upper left side          Past history, the patient had blood clots also back in 2014 during the early part of the year, and was seen at the [facility]. These occurred in the left arm, left leg, right arm clots, and multiple pulmonary emboli and she was placed back on her Coumadin by her primary.      Past medical history  Heterozygous factor V gene mutation  Sagittal sinus venous thrombosis 1994  Blood clots in 2014  Cataracts  ADD  Depression  Obstructive sleep apnea          She has the history of:  1. Sagittal venous thrombus in 1994.  2. Heterozygous factor V gene mutation.  3. History of obstructive sleep apnea, uses the CPAP machine. Working with a new sleep person is using the CPAP machine currently might have some narcoleptic type symptomatology  4. Intermittent headaches which seem well controlled and responsive to Diamox, using a low dose of 250 mg, one to two tablets per day. Increasing dose today up to 4  times per day  5. Has had some mild cataracts in the past, followed by  ophthalmology.  6. Pre-existing ADD in the past    Past history, the patient had blood clots also back in 2014 during the early part of the year, and was seen at the [facility]. These occurred in the left arm, left leg, right arm clots, and multiple pulmonary emboli and she was placed back on her Coumadin by her primary.      Past medical history  Heterozygous factor V gene mutation  Sagittal sinus venous thrombosis 1994  Blood clots in 2014  Cataracts  ADD  Depression  Obstructive sleep apnea    She switched from Zoloft to Wellbutrin for depression treatment.      Habits  Non-smoker  Rare alcoholic beverage  She has adopted children with special needs.        Family history  Paternal grandfather with stroke  Father with hypertension and high cholesterol and abdominal aneurysm  Mother with epilepsy, migraines, Alzheimer's disease and arthritis  Father had blood clots  Brother had blood clots  Sister has blood clots and does not have a factor V gene problem        Work-up  History of heterozygous factor V Leiden  7 sinus venous thrombosis 1994  MRI scan brain/MRV head December 2005  A.  Negative MRI scan of the head  B.  Improvement in the superior sagittal sinus thrombosis partially recannulized compared to 2000  EEG December 2007, mild right theta disorganization more so after hyperventilation bursts of theta no epileptiform activity  [place] [facility] August 2009 optic fundi look good  Stop anticoagulation February 2014 developed clot in the left arm left leg and right arm and multiple pulmonary emboli (restarted anticoagulation)      Laboratory review                      2/2014     1/2015   12/2017    10/2018   2/13/2021    6/17/2022      6/2023  Na/K          138/2.9     140/2.8   141/3.8     140/2.8    139/3.9          141/4.5       141/4.4    Cl/CO2       111/22     112/19    112/20      109/23     111/19            110/20           108/20    BUN/Cr                                                                                                                16.1/0.78    Glu                                                                               93                  91              100     AST                                                                                                   18    WBC/Hgb                                                                   9.8/14.3          7.2/13.6      12.2/13.0    Plts                                                                            289,000           302,000       297,000       INR                                                                            1.7                                     1.66    TSH                                                                                                    3.23            0.49          Review of systems    Headaches but no significant visual changes see above  Blind spots seem to be stable  No obscurations    No dysarthria  No dysphagia  No chest pain    Has had some flareup of her asthma stable today    Dry eyes due to her contacts following with optometrist    Numbness left V2  Blowout fracture May 2023 residual diplopia looking left and superiorly    Otherwise exam stable    Discussed past clotting disorder  Has followed with hematology  Is on warfarin followed by family    Family history is positive for clots in multiple family members son without factor V problem        Exam  Blood pressure 127/94, pulse 111  HEENT normal  Lungs clear  Heart rate regular  Abdomen soft  Symmetrical pulses  No edema in the feet    Neurologic exam  Alert oriented x3  No aphasia  No neglect  Normal memory recall    Cranial nerves II through XII significant for  Could not map out any significant blind spot in the past had minimal increase blind spot  Optic fundi look normal  Extraocular movements relatively intact some diplopia  looking to the left and superiorly from her block fracture  No nystagmus  Visual fields intact  Face symmetrical  Temperature appreciation symmetrical  Decreased light touch circular area V2 and 2 front teeth off to the left  Some itchiness in her left ear canal      Upper extremities  No drift no tremor normal finger-nose    Lower extremity strength normal    Reflexes symmetrical toes downgoing    Gait normal      Assessment/Plan     1.  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 Mcleod/ ophthalmologist following and tracking visual fields for good visual field to see if there is increased papilledema      2.  Factor V Leiden (D68.51)       Continue blood thinner warfarin INRs checked through primary       1994 maulik sinus thrombosis intracranially       2014 multifocal peripheral venous thrombosis and bilateral pulmonary emboli restarted warfarin    3.  RENETTA (Obstructive Sleep Apnea) (G47.33) Is wearing the CPAP mask at this time    She knows the risks and benefits of Diamox and the need for using this.   She stays well hydrated.   She knows about kidney stones, and this was re-discussed today also.   It can also cause electrolyte abnormalities, but she is on  low dose at this time.    Current plan:    1. Diamox 250 mg tablet up to 3 tablets/day, usually on 2 uses a third as a rescue  2. Should follow up yearly basis sooner if there is problems  3. See ophthalmologist for formal visual fields  4. Anticoagulation is controlled by her primary for her difficulty with clots, pulmonary emboli, and her clotting disorder as above.    In the past  5. Follow-up with a sleep specialist to see if she has narcoleptic tendencies or not she has some ADD the Adderall probably helps with that with concentration I do not feel that she has a progressive memory problem but if she is getting poor sleep she had ADD concentration may be difficult we had a long  discussion about that she probably did not have any significant brain damage from her past clots but needs to treat her symptoms when they arise      Refilled her Diamox  Discussed the block fracture that she had above  Patient will follow-up on a yearly basis  Multiple issues as above discussed    Total care time today 32 minutes      As part of visit today  Reviewed ophthalmology surgery 7/7/2023 for fracture  Reviewed primary MD note 6/29/2023  Reviewed laboratory data

## 2023-10-02 ENCOUNTER — OFFICE VISIT (OUTPATIENT)
Dept: NEUROLOGY | Facility: CLINIC | Age: 53
End: 2023-10-02
Payer: COMMERCIAL

## 2023-10-02 VITALS
SYSTOLIC BLOOD PRESSURE: 127 MMHG | BODY MASS INDEX: 44.39 KG/M2 | HEIGHT: 64 IN | DIASTOLIC BLOOD PRESSURE: 94 MMHG | WEIGHT: 260 LBS | HEART RATE: 111 BPM

## 2023-10-02 DIAGNOSIS — G93.2 PSEUDOTUMOR CEREBRI SYNDROME: Primary | ICD-10-CM

## 2023-10-02 DIAGNOSIS — D68.51 FACTOR 5 LEIDEN MUTATION, HETEROZYGOUS (H): ICD-10-CM

## 2023-10-02 PROCEDURE — 99214 OFFICE O/P EST MOD 30 MIN: CPT | Performed by: PSYCHIATRY & NEUROLOGY

## 2023-10-02 RX ORDER — ACETAZOLAMIDE 250 MG/1
250 TABLET ORAL 3 TIMES DAILY
Qty: 270 TABLET | Refills: 3 | Status: SHIPPED | OUTPATIENT
Start: 2023-10-02 | End: 2023-12-27

## 2023-10-02 NOTE — LETTER
10/2/2023         RE: Anna Nieves  [address] [facility] [id number]        Dear Colleague,    Thank you for referring your patient, Anna Nieves, to the [facility] [place]. Please see a copy of my visit note below.    In person evaluation    HPI  2/26/2020, in person visit  8/11/2021, in person visit  1/25/2023, in person visit  10/2/2023, in person visit      53-year-old followed neurologically for:  Sagittal sinus venous thrombosis 1994  Heterozygous factor V gene mutation  Obstructive sleep apnea  Pseudotumor cerebri with increased ICP and visual changes in the past    Since last seen 1/25/2023  Patient has an autistic child that accidentally hit her by the left eye and caused a blowout fracture by the left eye  She had diplopia and numbness in the V2 distribution  She also had some vertigo probably from the diplopia  Some dysesthesia felt like itching in the ear canal    7/7/2023 left orbital floor medial wall fracture repair with implant  Has residual diplopia looking up and to the left but tolerates it  Numbness on the left side of the face is getting to be less  She has some tactile sensation temperature appreciation in V2  There is 2 teeth that are numb upper left side      Pseudotumor cerebri under good control  No enlargement of the blind spot  Headaches very variable  When she has a flurry she takes the increased rescue dose of Diamox  Otherwise is on Diamox 250 mg tablet, 2 at nighttime    Feels the pseudotumor is stable          A.  Pseudotumor cerebri        Diagnosed 1994 with sagittal sinus venous thrombosis        Has had some mild cataracts in the past, followed by ophthalmology        Has seen Dr. Julio C Hernandez of ophthalmology    Headaches                          1/2023      10/2023  Frequency       3/week       2-3/month  Duration           4 hours      4 plus hours  Severity            mild          mild to moderate      Tylenol and a sinus medicine  1.5 tabs per week    No sign of withdrawal headache    Does take an extra Diamox occasionally  Otherwise is on 2 tabs at night  Medications written as 250 mg Diamox 1 tab 3 times daily  This way she has a rescue          B. Sagittal venous thrombus in 1994.       Heterozygous factor V gene mutation.       Patient on blood thinners per primary MD    C.  Obstructive sleep apnea       History of obstructive sleep apnea, uses the CPAP machine.        Working with a new sleep person is using the CPAP machine currently might have some narcoleptic type symptomatology      D. Pre-existing ADD in the past    E.  May 2023 blowout fracture left orbit       Patient has an autistic child that accidentally hit her by the left eye and caused a blowout fracture by the left eye       She had diplopia and numbness in the V2 distribution       She also had some vertigo probably from the diplopia       Some dysesthesia felt like itching in the ear canal         7/7/2023 left orbital floor medial wall fracture repair with implant       Has residual diplopia looking up and to the left but tolerates it       Numbness on the left side of the face is getting to be less       She has some tactile sensation temperature appreciation in V2       There is 2 teeth that are numb upper left side          Past history, the patient had blood clots also back in 2014 during the early part of the year, and was seen at the [facility]. These occurred in the left arm, left leg, right arm clots, and multiple pulmonary emboli and she was placed back on her Coumadin by her primary.      Past medical history  Heterozygous factor V gene mutation  Sagittal sinus venous thrombosis 1994  Blood clots in 2014  Cataracts  ADD  Depression  Obstructive sleep apnea          She has the history of:  1. Sagittal venous thrombus in 1994.  2. Heterozygous factor V gene mutation.  3. History of obstructive sleep apnea, uses the CPAP machine. Working with a new  sleep person is using the CPAP machine currently might have some narcoleptic type symptomatology  4. Intermittent headaches which seem well controlled and responsive to Diamox, using a low dose of 250 mg, one to two tablets per day. Increasing dose today up to 4 times per day  5. Has had some mild cataracts in the past, followed by  ophthalmology.  6. Pre-existing ADD in the past    Past history, the patient had blood clots also back in 2014 during the early part of the year, and was seen at the [facility]. These occurred in the left arm, left leg, right arm clots, and multiple pulmonary emboli and she was placed back on her Coumadin by her primary.      Past medical history  Heterozygous factor V gene mutation  Sagittal sinus venous thrombosis 1994  Blood clots in 2014  Cataracts  ADD  Depression  Obstructive sleep apnea    She switched from Zoloft to Wellbutrin for depression treatment.      Habits  Non-smoker  Rare alcoholic beverage  She has adopted children with special needs.        Family history  Paternal grandfather with stroke  Father with hypertension and high cholesterol and abdominal aneurysm  Mother with epilepsy, migraines, Alzheimer's disease and arthritis  Father had blood clots  Brother had blood clots  Sister has blood clots and does not have a factor V gene problem        Work-up  History of heterozygous factor V Leiden  7 sinus venous thrombosis 1994  MRI scan brain/MRV head December 2005  A.  Negative MRI scan of the head  B.  Improvement in the superior sagittal sinus thrombosis partially recannulized compared to 2000  EEG December 2007, mild right theta disorganization more so after hyperventilation bursts of theta no epileptiform activity  [place] [facility] August 2009 optic fundi look good  Stop anticoagulation February 2014 developed clot in the left arm left leg and right arm and multiple pulmonary emboli (restarted anticoagulation)      Laboratory review                       2/2014     1/2015   12/2017    10/2018   2/13/2021    6/17/2022      6/2023  Na/K          138/2.9     140/2.8   141/3.8     140/2.8    139/3.9          141/4.5       141/4.4    Cl/CO2       111/22     112/19    112/20      109/23     111/19           110/20           108/20    BUN/Cr                                                                                                                16.1/0.78    Glu                                                                               93                  91              100     AST                                                                                                   18    WBC/Hgb                                                                   9.8/14.3          7.2/13.6      12.2/13.0    Plts                                                                            289,000           302,000       297,000       INR                                                                            1.7                                     1.66    TSH                                                                                                    3.23            0.49          Review of systems    Headaches but no significant visual changes see above  Blind spots seem to be stable  No obscurations    No dysarthria  No dysphagia  No chest pain    Has had some flareup of her asthma stable today    Dry eyes due to her contacts following with optometrist    Numbness left V2  Blowout fracture May 2023 residual diplopia looking left and superiorly    Otherwise exam stable    Discussed past clotting disorder  Has followed with hematology  Is on warfarin followed by family    Family history is positive for clots in multiple family members son without factor V problem        Exam  Blood pressure 127/94, pulse 111  HEENT normal  Lungs clear  Heart rate regular  Abdomen soft  Symmetrical pulses  No edema in the feet    Neurologic exam  Alert oriented x3  No  aphasia  No neglect  Normal memory recall    Cranial nerves II through XII significant for  Could not map out any significant blind spot in the past had minimal increase blind spot  Optic fundi look normal  Extraocular movements relatively intact some diplopia looking to the left and superiorly from her block fracture  No nystagmus  Visual fields intact  Face symmetrical  Temperature appreciation symmetrical  Decreased light touch circular area V2 and 2 front teeth off to the left  Some itchiness in her left ear canal      Upper extremities  No drift no tremor normal finger-nose    Lower extremity strength normal    Reflexes symmetrical toes downgoing    Gait normal      Assessment/Plan     1.  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 Mcleod/ ophthalmologist following and tracking visual fields for good visual field to see if there is increased papilledema      2.  Factor V Leiden (D68.51)       Continue blood thinner warfarin INRs checked through primary       1994 maulik sinus thrombosis intracranially       2014 multifocal peripheral venous thrombosis and bilateral pulmonary emboli restarted warfarin    3.  RENETTA (Obstructive Sleep Apnea) (G47.33) Is wearing the CPAP mask at this time    She knows the risks and benefits of Diamox and the need for using this.   She stays well hydrated.   She knows about kidney stones, and this was re-discussed today also.   It can also cause electrolyte abnormalities, but she is on  low dose at this time.    Current plan:    1. Diamox 250 mg tablet up to 3 tablets/day, usually on 2 uses a third as a rescue  2. Should follow up yearly basis sooner if there is problems  3. See ophthalmologist for formal visual fields  4. Anticoagulation is controlled by her primary for her difficulty with clots, pulmonary emboli, and her clotting disorder as above.    In the past  5. Follow-up with a sleep specialist to see if  she has narcoleptic tendencies or not she has some ADD the Adderall probably helps with that with concentration I do not feel that she has a progressive memory problem but if she is getting poor sleep she had ADD concentration may be difficult we had a long discussion about that she probably did not have any significant brain damage from her past clots but needs to treat her symptoms when they arise      Refilled her Diamox  Discussed the block fracture that she had above  Patient will follow-up on a yearly basis  Multiple issues as above discussed    Total care time today 32 minutes      As part of visit today  Reviewed ophthalmology surgery 7/7/2023 for fracture  Reviewed primary MD note 6/29/2023  Reviewed laboratory data      Again, thank you for allowing me to participate in the care of your patient.        Sincerely,        julissa Riggs MD

## 2023-11-08 ENCOUNTER — LAB REQUISITION (OUTPATIENT)
Dept: LAB | Facility: CLINIC | Age: 53
End: 2023-11-08

## 2023-11-08 DIAGNOSIS — R10.32 LEFT LOWER QUADRANT PAIN: ICD-10-CM

## 2023-11-08 DIAGNOSIS — R10.13 EPIGASTRIC PAIN: ICD-10-CM

## 2023-11-08 PROCEDURE — 83690 ASSAY OF LIPASE: CPT | Performed by: PHYSICIAN ASSISTANT

## 2023-11-08 PROCEDURE — 86140 C-REACTIVE PROTEIN: CPT | Performed by: PHYSICIAN ASSISTANT

## 2023-11-08 PROCEDURE — 80053 COMPREHEN METABOLIC PANEL: CPT | Performed by: PHYSICIAN ASSISTANT

## 2023-11-09 LAB
ALBUMIN SERPL BCG-MCNC: 4.5 G/DL (ref 3.5–5.2)
ALP SERPL-CCNC: 97 U/L (ref 35–104)
ALT SERPL W P-5'-P-CCNC: 12 U/L (ref 0–50)
ANION GAP SERPL CALCULATED.3IONS-SCNC: 12 MMOL/L (ref 7–15)
AST SERPL W P-5'-P-CCNC: 19 U/L (ref 0–45)
BILIRUB SERPL-MCNC: 0.5 MG/DL
BUN SERPL-MCNC: 13.5 MG/DL (ref 6–20)
CALCIUM SERPL-MCNC: 9.1 MG/DL (ref 8.6–10)
CHLORIDE SERPL-SCNC: 106 MMOL/L (ref 98–107)
CREAT SERPL-MCNC: 1.11 MG/DL (ref 0.51–0.95)
CRP SERPL-MCNC: 4.49 MG/L
DEPRECATED HCO3 PLAS-SCNC: 20 MMOL/L (ref 22–29)
EGFRCR SERPLBLD CKD-EPI 2021: 60 ML/MIN/1.73M2
GLUCOSE SERPL-MCNC: 99 MG/DL (ref 70–99)
LIPASE SERPL-CCNC: 51 U/L (ref 13–60)
POTASSIUM SERPL-SCNC: 4 MMOL/L (ref 3.4–5.3)
PROT SERPL-MCNC: 7.5 G/DL (ref 6.4–8.3)
SODIUM SERPL-SCNC: 138 MMOL/L (ref 135–145)

## 2023-11-27 ENCOUNTER — LAB REQUISITION (OUTPATIENT)
Dept: LAB | Facility: CLINIC | Age: 53
End: 2023-11-27

## 2023-11-27 DIAGNOSIS — J02.9 ACUTE PHARYNGITIS, UNSPECIFIED: ICD-10-CM

## 2023-11-27 PROCEDURE — 87081 CULTURE SCREEN ONLY: CPT | Performed by: FAMILY MEDICINE

## 2023-11-29 LAB — BACTERIA SPEC CULT: NORMAL

## 2023-12-26 ENCOUNTER — APPOINTMENT (OUTPATIENT)
Dept: CT IMAGING | Facility: HOSPITAL | Age: 53
End: 2023-12-26
Attending: STUDENT IN AN ORGANIZED HEALTH CARE EDUCATION/TRAINING PROGRAM
Payer: COMMERCIAL

## 2023-12-26 ENCOUNTER — APPOINTMENT (OUTPATIENT)
Dept: ULTRASOUND IMAGING | Facility: HOSPITAL | Age: 53
End: 2023-12-26
Attending: STUDENT IN AN ORGANIZED HEALTH CARE EDUCATION/TRAINING PROGRAM
Payer: COMMERCIAL

## 2023-12-26 ENCOUNTER — HOSPITAL ENCOUNTER (OUTPATIENT)
Facility: HOSPITAL | Age: 53
Setting detail: OBSERVATION
Discharge: HOME | End: 2023-12-28
Attending: STUDENT IN AN ORGANIZED HEALTH CARE EDUCATION/TRAINING PROGRAM | Admitting: HOSPITALIST
Payer: COMMERCIAL

## 2023-12-26 DIAGNOSIS — Z87.2 HISTORY OF CHRONIC URTICARIA: ICD-10-CM

## 2023-12-26 DIAGNOSIS — S09.90XA INJURY OF HEAD, INITIAL ENCOUNTER: ICD-10-CM

## 2023-12-26 DIAGNOSIS — I74.9 THROMBOEMBOLIC DISORDER (H): ICD-10-CM

## 2023-12-26 DIAGNOSIS — S02.30XA CLOSED FRACTURE OF ORBITAL FLOOR, UNSPECIFIED LATERALITY, INITIAL ENCOUNTER (H): ICD-10-CM

## 2023-12-26 DIAGNOSIS — D68.51 FACTOR 5 LEIDEN MUTATION, HETEROZYGOUS (H): ICD-10-CM

## 2023-12-26 DIAGNOSIS — R00.0 TACHYCARDIA: ICD-10-CM

## 2023-12-26 LAB
ALBUMIN SERPL BCG-MCNC: 4.3 G/DL (ref 3.5–5.2)
ALP SERPL-CCNC: 111 U/L (ref 40–150)
ALT SERPL W P-5'-P-CCNC: 19 U/L (ref 0–50)
ANION GAP SERPL CALCULATED.3IONS-SCNC: 11 MMOL/L (ref 7–15)
AST SERPL W P-5'-P-CCNC: 16 U/L (ref 0–45)
BASOPHILS # BLD AUTO: 0 10E3/UL (ref 0–0.2)
BASOPHILS NFR BLD AUTO: 0 %
BILIRUB DIRECT SERPL-MCNC: <0.2 MG/DL (ref 0–0.3)
BILIRUB SERPL-MCNC: 0.3 MG/DL
BUN SERPL-MCNC: 8.2 MG/DL (ref 6–20)
CALCIUM SERPL-MCNC: 8.5 MG/DL (ref 8.6–10)
CHLORIDE SERPL-SCNC: 104 MMOL/L (ref 98–107)
CREAT SERPL-MCNC: 0.74 MG/DL (ref 0.51–0.95)
DEPRECATED HCO3 PLAS-SCNC: 19 MMOL/L (ref 22–29)
EGFRCR SERPLBLD CKD-EPI 2021: >90 ML/MIN/1.73M2
EOSINOPHIL # BLD AUTO: 0.1 10E3/UL (ref 0–0.7)
EOSINOPHIL NFR BLD AUTO: 1 %
ERYTHROCYTE [DISTWIDTH] IN BLOOD BY AUTOMATED COUNT: 13.3 % (ref 10–15)
FLUAV RNA SPEC QL NAA+PROBE: POSITIVE
FLUBV RNA RESP QL NAA+PROBE: NEGATIVE
GLUCOSE SERPL-MCNC: 116 MG/DL (ref 70–99)
HCT VFR BLD AUTO: 38.7 % (ref 35–47)
HGB BLD-MCNC: 12.7 G/DL (ref 11.7–15.7)
IMM GRANULOCYTES # BLD: 0.1 10E3/UL
IMM GRANULOCYTES NFR BLD: 1 %
INR PPP: 2.39 (ref 0.85–1.15)
LYMPHOCYTES # BLD AUTO: 0.4 10E3/UL (ref 0.8–5.3)
LYMPHOCYTES NFR BLD AUTO: 4 %
MAGNESIUM SERPL-MCNC: 1.6 MG/DL (ref 1.7–2.3)
MCH RBC QN AUTO: 29 PG (ref 26.5–33)
MCHC RBC AUTO-ENTMCNC: 32.8 G/DL (ref 31.5–36.5)
MCV RBC AUTO: 88 FL (ref 78–100)
MONOCYTES # BLD AUTO: 0.5 10E3/UL (ref 0–1.3)
MONOCYTES NFR BLD AUTO: 6 %
NEUTROPHILS # BLD AUTO: 7.7 10E3/UL (ref 1.6–8.3)
NEUTROPHILS NFR BLD AUTO: 88 %
NRBC # BLD AUTO: 0 10E3/UL
NRBC BLD AUTO-RTO: 0 /100
PLATELET # BLD AUTO: 228 10E3/UL (ref 150–450)
POTASSIUM SERPL-SCNC: 3.8 MMOL/L (ref 3.4–5.3)
PROT SERPL-MCNC: 7.2 G/DL (ref 6.4–8.3)
RBC # BLD AUTO: 4.38 10E6/UL (ref 3.8–5.2)
RSV RNA SPEC NAA+PROBE: NEGATIVE
SARS-COV-2 RNA RESP QL NAA+PROBE: NEGATIVE
SODIUM SERPL-SCNC: 134 MMOL/L (ref 135–145)
TROPONIN T SERPL HS-MCNC: <6 NG/L
WBC # BLD AUTO: 8.7 10E3/UL (ref 4–11)

## 2023-12-26 PROCEDURE — 87637 SARSCOV2&INF A&B&RSV AMP PRB: CPT | Performed by: STUDENT IN AN ORGANIZED HEALTH CARE EDUCATION/TRAINING PROGRAM

## 2023-12-26 PROCEDURE — 93970 EXTREMITY STUDY: CPT

## 2023-12-26 PROCEDURE — 250N000013 HC RX MED GY IP 250 OP 250 PS 637: Performed by: STUDENT IN AN ORGANIZED HEALTH CARE EDUCATION/TRAINING PROGRAM

## 2023-12-26 PROCEDURE — 96361 HYDRATE IV INFUSION ADD-ON: CPT

## 2023-12-26 PROCEDURE — 74177 CT ABD & PELVIS W/CONTRAST: CPT

## 2023-12-26 PROCEDURE — 85610 PROTHROMBIN TIME: CPT | Performed by: STUDENT IN AN ORGANIZED HEALTH CARE EDUCATION/TRAINING PROGRAM

## 2023-12-26 PROCEDURE — 71275 CT ANGIOGRAPHY CHEST: CPT

## 2023-12-26 PROCEDURE — 258N000003 HC RX IP 258 OP 636: Performed by: STUDENT IN AN ORGANIZED HEALTH CARE EDUCATION/TRAINING PROGRAM

## 2023-12-26 PROCEDURE — 82248 BILIRUBIN DIRECT: CPT | Performed by: STUDENT IN AN ORGANIZED HEALTH CARE EDUCATION/TRAINING PROGRAM

## 2023-12-26 PROCEDURE — 36415 COLL VENOUS BLD VENIPUNCTURE: CPT | Performed by: STUDENT IN AN ORGANIZED HEALTH CARE EDUCATION/TRAINING PROGRAM

## 2023-12-26 PROCEDURE — 96366 THER/PROPH/DIAG IV INF ADDON: CPT

## 2023-12-26 PROCEDURE — 80053 COMPREHEN METABOLIC PANEL: CPT | Performed by: STUDENT IN AN ORGANIZED HEALTH CARE EDUCATION/TRAINING PROGRAM

## 2023-12-26 PROCEDURE — 84484 ASSAY OF TROPONIN QUANT: CPT | Performed by: STUDENT IN AN ORGANIZED HEALTH CARE EDUCATION/TRAINING PROGRAM

## 2023-12-26 PROCEDURE — 250N000011 HC RX IP 250 OP 636: Mod: JZ | Performed by: STUDENT IN AN ORGANIZED HEALTH CARE EDUCATION/TRAINING PROGRAM

## 2023-12-26 PROCEDURE — 84443 ASSAY THYROID STIM HORMONE: CPT | Performed by: STUDENT IN AN ORGANIZED HEALTH CARE EDUCATION/TRAINING PROGRAM

## 2023-12-26 PROCEDURE — 93005 ELECTROCARDIOGRAM TRACING: CPT | Performed by: STUDENT IN AN ORGANIZED HEALTH CARE EDUCATION/TRAINING PROGRAM

## 2023-12-26 PROCEDURE — 96365 THER/PROPH/DIAG IV INF INIT: CPT | Mod: 59

## 2023-12-26 PROCEDURE — 85025 COMPLETE CBC W/AUTO DIFF WBC: CPT | Performed by: STUDENT IN AN ORGANIZED HEALTH CARE EDUCATION/TRAINING PROGRAM

## 2023-12-26 PROCEDURE — 83735 ASSAY OF MAGNESIUM: CPT | Performed by: STUDENT IN AN ORGANIZED HEALTH CARE EDUCATION/TRAINING PROGRAM

## 2023-12-26 PROCEDURE — 99285 EMERGENCY DEPT VISIT HI MDM: CPT | Mod: 25

## 2023-12-26 RX ORDER — KETOROLAC TROMETHAMINE 15 MG/ML
15 INJECTION, SOLUTION INTRAMUSCULAR; INTRAVENOUS ONCE
Status: DISCONTINUED | OUTPATIENT
Start: 2023-12-26 | End: 2023-12-26

## 2023-12-26 RX ORDER — ACETAMINOPHEN 325 MG/1
975 TABLET ORAL ONCE
Status: COMPLETED | OUTPATIENT
Start: 2023-12-26 | End: 2023-12-26

## 2023-12-26 RX ORDER — MAGNESIUM SULFATE HEPTAHYDRATE 40 MG/ML
2 INJECTION, SOLUTION INTRAVENOUS ONCE
Status: COMPLETED | OUTPATIENT
Start: 2023-12-26 | End: 2023-12-26

## 2023-12-26 RX ORDER — IOPAMIDOL 755 MG/ML
100 INJECTION, SOLUTION INTRAVASCULAR ONCE
Status: COMPLETED | OUTPATIENT
Start: 2023-12-26 | End: 2023-12-26

## 2023-12-26 RX ADMIN — ACETAMINOPHEN 975 MG: 325 TABLET ORAL at 21:42

## 2023-12-26 RX ADMIN — MAGNESIUM SULFATE HEPTAHYDRATE 2 G: 40 INJECTION, SOLUTION INTRAVENOUS at 21:42

## 2023-12-26 RX ADMIN — SODIUM CHLORIDE, POTASSIUM CHLORIDE, SODIUM LACTATE AND CALCIUM CHLORIDE 1000 ML: 600; 310; 30; 20 INJECTION, SOLUTION INTRAVENOUS at 19:46

## 2023-12-26 RX ADMIN — IOPAMIDOL 100 ML: 755 INJECTION, SOLUTION INTRAVENOUS at 20:54

## 2023-12-26 RX ADMIN — SODIUM CHLORIDE, POTASSIUM CHLORIDE, SODIUM LACTATE AND CALCIUM CHLORIDE 1000 ML: 600; 310; 30; 20 INJECTION, SOLUTION INTRAVENOUS at 21:42

## 2023-12-26 ASSESSMENT — ACTIVITIES OF DAILY LIVING (ADL)
ADLS_ACUITY_SCORE: 35
ADLS_ACUITY_SCORE: 35

## 2023-12-27 PROBLEM — R00.0 TACHYCARDIA: Status: ACTIVE | Noted: 2023-12-27

## 2023-12-27 LAB
ANION GAP SERPL CALCULATED.3IONS-SCNC: 9 MMOL/L (ref 7–15)
BUN SERPL-MCNC: 7.5 MG/DL (ref 6–20)
CALCIUM SERPL-MCNC: 8.6 MG/DL (ref 8.6–10)
CHLORIDE SERPL-SCNC: 108 MMOL/L (ref 98–107)
CREAT SERPL-MCNC: 0.71 MG/DL (ref 0.51–0.95)
CRP SERPL-MCNC: 34.5 MG/L
DEPRECATED HCO3 PLAS-SCNC: 21 MMOL/L (ref 22–29)
EGFRCR SERPLBLD CKD-EPI 2021: >90 ML/MIN/1.73M2
ERYTHROCYTE [DISTWIDTH] IN BLOOD BY AUTOMATED COUNT: 13.3 % (ref 10–15)
GLUCOSE SERPL-MCNC: 121 MG/DL (ref 70–99)
HCT VFR BLD AUTO: 35.3 % (ref 35–47)
HGB BLD-MCNC: 11.5 G/DL (ref 11.7–15.7)
INR PPP: 2.45 (ref 0.85–1.15)
MCH RBC QN AUTO: 29.2 PG (ref 26.5–33)
MCHC RBC AUTO-ENTMCNC: 32.6 G/DL (ref 31.5–36.5)
MCV RBC AUTO: 90 FL (ref 78–100)
PLATELET # BLD AUTO: 196 10E3/UL (ref 150–450)
POTASSIUM SERPL-SCNC: 3.7 MMOL/L (ref 3.4–5.3)
PROCALCITONIN SERPL IA-MCNC: 0.08 NG/ML
RBC # BLD AUTO: 3.94 10E6/UL (ref 3.8–5.2)
SODIUM SERPL-SCNC: 138 MMOL/L (ref 135–145)
TSH SERPL DL<=0.005 MIU/L-ACNC: 1.15 UIU/ML (ref 0.3–4.2)
WBC # BLD AUTO: 5.6 10E3/UL (ref 4–11)

## 2023-12-27 PROCEDURE — 85027 COMPLETE CBC AUTOMATED: CPT | Performed by: HOSPITALIST

## 2023-12-27 PROCEDURE — G0378 HOSPITAL OBSERVATION PER HR: HCPCS

## 2023-12-27 PROCEDURE — 96361 HYDRATE IV INFUSION ADD-ON: CPT

## 2023-12-27 PROCEDURE — 85610 PROTHROMBIN TIME: CPT | Performed by: HOSPITALIST

## 2023-12-27 PROCEDURE — 86140 C-REACTIVE PROTEIN: CPT | Performed by: INTERNAL MEDICINE

## 2023-12-27 PROCEDURE — 36415 COLL VENOUS BLD VENIPUNCTURE: CPT | Performed by: HOSPITALIST

## 2023-12-27 PROCEDURE — 258N000003 HC RX IP 258 OP 636: Performed by: INTERNAL MEDICINE

## 2023-12-27 PROCEDURE — 250N000013 HC RX MED GY IP 250 OP 250 PS 637: Performed by: HOSPITALIST

## 2023-12-27 PROCEDURE — 99222 1ST HOSP IP/OBS MODERATE 55: CPT | Performed by: HOSPITALIST

## 2023-12-27 PROCEDURE — 258N000003 HC RX IP 258 OP 636: Performed by: HOSPITALIST

## 2023-12-27 PROCEDURE — 84145 PROCALCITONIN (PCT): CPT | Performed by: INTERNAL MEDICINE

## 2023-12-27 PROCEDURE — 258N000003 HC RX IP 258 OP 636: Performed by: STUDENT IN AN ORGANIZED HEALTH CARE EDUCATION/TRAINING PROGRAM

## 2023-12-27 PROCEDURE — 80048 BASIC METABOLIC PNL TOTAL CA: CPT | Performed by: HOSPITALIST

## 2023-12-27 PROCEDURE — 250N000013 HC RX MED GY IP 250 OP 250 PS 637: Performed by: INTERNAL MEDICINE

## 2023-12-27 PROCEDURE — 99207 PR APP CREDIT; MD BILLING SHARED VISIT: CPT | Performed by: INTERNAL MEDICINE

## 2023-12-27 RX ORDER — BISACODYL 10 MG
10 SUPPOSITORY, RECTAL RECTAL DAILY PRN
Status: DISCONTINUED | OUTPATIENT
Start: 2023-12-27 | End: 2023-12-28 | Stop reason: HOSPADM

## 2023-12-27 RX ORDER — ONDANSETRON 4 MG/1
4 TABLET, ORALLY DISINTEGRATING ORAL EVERY 6 HOURS PRN
Status: DISCONTINUED | OUTPATIENT
Start: 2023-12-27 | End: 2023-12-28 | Stop reason: HOSPADM

## 2023-12-27 RX ORDER — ALBUTEROL SULFATE 90 UG/1
2 AEROSOL, METERED RESPIRATORY (INHALATION) EVERY 4 HOURS PRN
Status: DISCONTINUED | OUTPATIENT
Start: 2023-12-27 | End: 2023-12-28 | Stop reason: HOSPADM

## 2023-12-27 RX ORDER — OSELTAMIVIR PHOSPHATE 75 MG/1
75 CAPSULE ORAL 2 TIMES DAILY
Status: DISCONTINUED | OUTPATIENT
Start: 2023-12-27 | End: 2023-12-28 | Stop reason: HOSPADM

## 2023-12-27 RX ORDER — LORAZEPAM 0.5 MG/1
0.5 TABLET ORAL DAILY PRN
Status: DISCONTINUED | OUTPATIENT
Start: 2023-12-27 | End: 2023-12-28 | Stop reason: HOSPADM

## 2023-12-27 RX ORDER — HYDRALAZINE HYDROCHLORIDE 20 MG/ML
10 INJECTION INTRAMUSCULAR; INTRAVENOUS EVERY 4 HOURS PRN
Status: DISCONTINUED | OUTPATIENT
Start: 2023-12-27 | End: 2023-12-28 | Stop reason: HOSPADM

## 2023-12-27 RX ORDER — GUAIFENESIN 200 MG/10ML
200 LIQUID ORAL EVERY 4 HOURS PRN
Status: DISCONTINUED | OUTPATIENT
Start: 2023-12-27 | End: 2023-12-28 | Stop reason: HOSPADM

## 2023-12-27 RX ORDER — SODIUM CHLORIDE, SODIUM LACTATE, POTASSIUM CHLORIDE, CALCIUM CHLORIDE 600; 310; 30; 20 MG/100ML; MG/100ML; MG/100ML; MG/100ML
INJECTION, SOLUTION INTRAVENOUS ONCE
Status: COMPLETED | OUTPATIENT
Start: 2023-12-27 | End: 2023-12-27

## 2023-12-27 RX ORDER — ACETAMINOPHEN 325 MG/1
650 TABLET ORAL EVERY 4 HOURS PRN
Status: DISCONTINUED | OUTPATIENT
Start: 2023-12-27 | End: 2023-12-28 | Stop reason: HOSPADM

## 2023-12-27 RX ORDER — HYDRALAZINE HYDROCHLORIDE 10 MG/1
10 TABLET, FILM COATED ORAL EVERY 4 HOURS PRN
Status: DISCONTINUED | OUTPATIENT
Start: 2023-12-27 | End: 2023-12-28 | Stop reason: HOSPADM

## 2023-12-27 RX ORDER — ACETAZOLAMIDE 250 MG/1
250 TABLET ORAL DAILY PRN
COMMUNITY
End: 2024-10-07

## 2023-12-27 RX ORDER — ACETAZOLAMIDE 250 MG/1
500 TABLET ORAL AT BEDTIME
Status: DISCONTINUED | OUTPATIENT
Start: 2023-12-27 | End: 2023-12-28 | Stop reason: HOSPADM

## 2023-12-27 RX ORDER — ACETAZOLAMIDE 250 MG/1
500 TABLET ORAL AT BEDTIME
COMMUNITY
End: 2024-10-07

## 2023-12-27 RX ORDER — LIDOCAINE 40 MG/G
CREAM TOPICAL
Status: DISCONTINUED | OUTPATIENT
Start: 2023-12-27 | End: 2023-12-28 | Stop reason: HOSPADM

## 2023-12-27 RX ORDER — AMOXICILLIN 250 MG
2 CAPSULE ORAL 2 TIMES DAILY PRN
Status: DISCONTINUED | OUTPATIENT
Start: 2023-12-27 | End: 2023-12-28 | Stop reason: HOSPADM

## 2023-12-27 RX ORDER — CALCIUM CARBONATE 500 MG/1
1000 TABLET, CHEWABLE ORAL 2 TIMES DAILY PRN
Status: DISCONTINUED | OUTPATIENT
Start: 2023-12-27 | End: 2023-12-28 | Stop reason: HOSPADM

## 2023-12-27 RX ORDER — POLYETHYLENE GLYCOL 3350 17 G/17G
17 POWDER, FOR SOLUTION ORAL 2 TIMES DAILY PRN
Status: DISCONTINUED | OUTPATIENT
Start: 2023-12-27 | End: 2023-12-28 | Stop reason: HOSPADM

## 2023-12-27 RX ORDER — SODIUM CHLORIDE, SODIUM LACTATE, POTASSIUM CHLORIDE, CALCIUM CHLORIDE 600; 310; 30; 20 MG/100ML; MG/100ML; MG/100ML; MG/100ML
INJECTION, SOLUTION INTRAVENOUS CONTINUOUS
Status: DISCONTINUED | OUTPATIENT
Start: 2023-12-27 | End: 2023-12-28 | Stop reason: HOSPADM

## 2023-12-27 RX ORDER — ACETAMINOPHEN 650 MG/1
650 SUPPOSITORY RECTAL EVERY 4 HOURS PRN
Status: DISCONTINUED | OUTPATIENT
Start: 2023-12-27 | End: 2023-12-28 | Stop reason: HOSPADM

## 2023-12-27 RX ORDER — WARFARIN SODIUM 2 MG/1
4 TABLET ORAL ONCE
Status: COMPLETED | OUTPATIENT
Start: 2023-12-27 | End: 2023-12-27

## 2023-12-27 RX ORDER — ONDANSETRON 2 MG/ML
4 INJECTION INTRAMUSCULAR; INTRAVENOUS EVERY 6 HOURS PRN
Status: DISCONTINUED | OUTPATIENT
Start: 2023-12-27 | End: 2023-12-28 | Stop reason: HOSPADM

## 2023-12-27 RX ORDER — ACETAZOLAMIDE 250 MG/1
250 TABLET ORAL
Status: DISCONTINUED | OUTPATIENT
Start: 2023-12-27 | End: 2023-12-27

## 2023-12-27 RX ORDER — WARFARIN SODIUM 3 MG/1
3 TABLET ORAL
Status: COMPLETED | OUTPATIENT
Start: 2023-12-27 | End: 2023-12-27

## 2023-12-27 RX ORDER — AMOXICILLIN 250 MG
1 CAPSULE ORAL 2 TIMES DAILY PRN
Status: DISCONTINUED | OUTPATIENT
Start: 2023-12-27 | End: 2023-12-28 | Stop reason: HOSPADM

## 2023-12-27 RX ORDER — WARFARIN SODIUM 4 MG/1
4 TABLET ORAL AT BEDTIME
COMMUNITY
End: 2024-01-15

## 2023-12-27 RX ADMIN — ACETAZOLAMIDE 500 MG: 250 TABLET ORAL at 02:19

## 2023-12-27 RX ADMIN — WARFARIN SODIUM 3 MG: 3 TABLET ORAL at 17:54

## 2023-12-27 RX ADMIN — WARFARIN SODIUM 4 MG: 2 TABLET ORAL at 02:18

## 2023-12-27 RX ADMIN — GUAIFENESIN 200 MG: 200 SOLUTION ORAL at 22:42

## 2023-12-27 RX ADMIN — ACETAZOLAMIDE 500 MG: 250 TABLET ORAL at 21:31

## 2023-12-27 RX ADMIN — SODIUM CHLORIDE, POTASSIUM CHLORIDE, SODIUM LACTATE AND CALCIUM CHLORIDE: 600; 310; 30; 20 INJECTION, SOLUTION INTRAVENOUS at 11:48

## 2023-12-27 RX ADMIN — SODIUM CHLORIDE, POTASSIUM CHLORIDE, SODIUM LACTATE AND CALCIUM CHLORIDE: 600; 310; 30; 20 INJECTION, SOLUTION INTRAVENOUS at 22:55

## 2023-12-27 RX ADMIN — OSELTAMIVIR PHOSPHATE 75 MG: 75 CAPSULE ORAL at 09:43

## 2023-12-27 RX ADMIN — ACETAMINOPHEN 650 MG: 325 TABLET ORAL at 06:16

## 2023-12-27 RX ADMIN — OSELTAMIVIR PHOSPHATE 75 MG: 75 CAPSULE ORAL at 21:31

## 2023-12-27 RX ADMIN — ACETAMINOPHEN 650 MG: 325 TABLET ORAL at 13:52

## 2023-12-27 RX ADMIN — SODIUM CHLORIDE, POTASSIUM CHLORIDE, SODIUM LACTATE AND CALCIUM CHLORIDE: 600; 310; 30; 20 INJECTION, SOLUTION INTRAVENOUS at 00:48

## 2023-12-27 RX ADMIN — ACETAMINOPHEN 650 MG: 325 TABLET ORAL at 21:31

## 2023-12-27 ASSESSMENT — ACTIVITIES OF DAILY LIVING (ADL)
ADLS_ACUITY_SCORE: 35
DEPENDENT_IADLS:: INDEPENDENT
ADLS_ACUITY_SCORE: 35
ADLS_ACUITY_SCORE: 21
ADLS_ACUITY_SCORE: 35

## 2023-12-27 NOTE — ED PROVIDER NOTES
Emergency Department Encounter         FINAL IMPRESSION:  INFLUENZA           ED COURSE AND MEDICAL DECISION MAKING       ED Course as of 12/26/23 2108 Tue Dec 26, 2023   1852 EKG sinus tachycardia 149, no STEMI, no inversions no depressions QTc 400, EKG is similar to previous in February 2014.   1918 Patient is morbidly beast hypertensive due to-year-old female history factor V Leiden on Coumadin, here with couple days of severe fatigue and leg pain as well as cough, abdominal discomfort, back pain.  No neck stiffness or headache.  Decreased urine output because of dehydration.  Patient states she has had multiple blood clots in the past including her legs chest head and arms.  No vomiting.  Increased nausea.  Decreased p.o.  On arrival here she is tachycardic in the 150s.  EKG as above.  Plan for cardiopulmonary evaluation, CT chest abdomen pelvis, COVID swab, fluids and reevaluate     - patient persistently tachycardic down here. Will admit for obs for continued fluids and monitoring.                Medical Decision Making    History:  Supplemental history from: Documented in chart, if applicable and Family Member/Significant Other  External Record(s) reviewed: Documented in chart, if applicable.    Work Up:  Chart documentation includes differential considered and any EKGs or imaging independently interpreted by provider, where specified.  In additional to work up documented, I considered the following work up: Documented in chart, if applicable.    External consultation:  Discussion of management with another provider: Documented in chart, if applicable    Complicating factors:  Care impacted by chronic illness: Anticoagulated State and Other: thromboembolic disorder  Care affected by social determinants of health: N/A    Disposition considerations: Admit.                  EKG  EKG sinus tachycardia 149, no STEMI, no inversions no depressions QTc 400, EKG is similar to previous in February  2014.      Critical Care     Performed by: Eb Daniel or    Authorized by: Eb Daniel  Total critical care time: minutes  Critical care was necessary to treat or prevent imminent or life-threatening deterioration of the following conditions:   Critical care was time spent personally by me on the following activities: development of treatment plan with patient or surrogate, discussions with consultants, examination of patient, evaluation of patient's response to treatment, obtaining history from patient or surrogate, ordering and performing treatments and interventions, ordering and review of laboratory studies, ordering and review of radiographic studies, re-evaluation of patient's condition and monitoring for potential decompensation.  Critical care time was exclusive of separately billable procedures and treating other patients.'    At the conclusion of the encounter I discussed the results of all the tests and the disposition. The questions were answered. The patient or family acknowledged understanding and was agreeable with the care plan.        MEDICATIONS GIVEN IN THE EMERGENCY DEPARTMENT:  Medications   lactated ringers BOLUS 1,000 mL (has no administration in time range)   lactated ringers BOLUS 1,000 mL (1,000 mLs Intravenous $New Bag 12/26/23 1946)   iopamidol (ISOVUE-370) solution 100 mL (100 mLs Intravenous $Given 12/26/23 2054)       NEW PRESCRIPTIONS STARTED AT TODAY'S ED VISIT:  New Prescriptions    No medications on file       HPI     Patient information obtained from: patient and      Use of : N/A     Anna Nieves is a 53 year old female with a pertinent history of hypertension who presents to this ED by walk-in with family for evaluation of leg pain.     Patient is Coumadin for V Leiden. Patient endorses a couple days of severe fatigue and leg pain as well as cough, abdominal discomfort, back pain.  No neck stiffness or headache.  Decreased urine output because of dehydration.  " Patient states she has had multiple blood clots in the past including her legs chest head and arms.  No vomiting.  Increased nausea.  Decreased p.o.     MEDICAL HISTORY     Past Medical History:   Diagnosis Date    Deep vein thrombosis (H)     Factor V deficiency (H)     Gastroesophageal reflux disease     Hypothyroidism     Pulmonary embolism (H)     Sleep apnea        Past Surgical History:   Procedure Laterality Date    OPEN REDUCTION INTERNAL FIXATION ORBIT BLOWOUT Left 7/7/2023    Procedure: left orbital floor and medial wall fracture repair with implant;  Surgeon: Fina Sheppard MD;  Location: [facility] OR    OPTICAL TRACKING SYSTEM ORBITOTOMY Left 7/7/2023    Procedure: LEFT ORBITOTOMY, USING OPTICAL TRACKING SYSTEM;  Surgeon: Fina Sheppard MD;  Location: [facility] OR    wisdom teeth removal         Social History     Tobacco Use    Smoking status: Never    Smokeless tobacco: Never   Vaping Use    Vaping Use: Never used   Substance Use Topics    Alcohol use: Yes     Comment: 1-2 times per year    Drug use: Never       acetaZOLAMIDE (DIAMOX) 250 MG tablet  albuterol (PROAIR HFA/PROVENTIL HFA/VENTOLIN HFA) 108 (90 Base) MCG/ACT inhaler  amphetamine-dextroamphetamine (ADDERALL XR) 30 MG 24 hr capsule  cetirizine (ZYRTEC) 10 MG tablet  fluticasone (ARNUITY ELLIPTA) 100 MCG/ACT inhaler  HYDROcodone-acetaminophen (NORCO) 5-325 MG tablet  Multiple Vitamin (MULTIVITAMIN ADULT PO)  omeprazole (PRILOSEC) 20 MG DR capsule  pseudoePHEDrine (SUDAFED) 30 MG tablet  thyroid (ARMOUR) 120 MG tablet  vitamin D2 (ERGOCALCIFEROL) 29730 units (1250 mcg) capsule  warfarin (COUMADIN) 3 MG tablet            PHYSICAL EXAM     BP (!) 150/81   Pulse (!) 131   Resp 24   Ht 1.626 m (5' 4\")   Wt 113.4 kg (250 lb)   LMP 12/13/2023 (Exact Date)   SpO2 99%   BMI 42.91 kg/m        PHYSICAL EXAM:     General: Patient appears well, nontoxic, comfortable  HEENT: Moist mucous membranes,  No head trauma.    Cardiovascular: tachycardic, normal " rhythm, no extremity edema.  No appreciable murmur.  Respiratory: No signs of respiratory distress, lungs are clear to auscultation bilaterally with no wheezes rhonchi or rales.  Abdominal: Soft, nontender, nondistended, no palpable masses, no guarding, no rebound  Musculoskeletal: Full range of motion of joints, no deformities appreciated.  Neurological: Alert and oriented, grossly neurologically intact.  Psychological: Normal affect and mood.  Integument: No rashes appreciated          RESULTS       Labs Ordered and Resulted from Time of ED Arrival to Time of ED Departure   BASIC METABOLIC PANEL - Abnormal       Result Value    Sodium 134 (*)     Potassium 3.8      Chloride 104      Carbon Dioxide (CO2) 19 (*)     Anion Gap 11      Urea Nitrogen 8.2      Creatinine 0.74      GFR Estimate >90      Calcium 8.5 (*)     Glucose 116 (*)    MAGNESIUM - Abnormal    Magnesium 1.6 (*)    INFLUENZA A/B, RSV, & SARS-COV2 PCR - Abnormal    Influenza A PCR Positive (*)     Influenza B PCR Negative      RSV PCR Negative      SARS CoV2 PCR Negative     INR - Abnormal    INR 2.39 (*)    CBC WITH PLATELETS AND DIFFERENTIAL - Abnormal    WBC Count 8.7      RBC Count 4.38      Hemoglobin 12.7      Hematocrit 38.7      MCV 88      MCH 29.0      MCHC 32.8      RDW 13.3      Platelet Count 228      % Neutrophils 88      % Lymphocytes 4      % Monocytes 6      % Eosinophils 1      % Basophils 0      % Immature Granulocytes 1      NRBCs per 100 WBC 0      Absolute Neutrophils 7.7      Absolute Lymphocytes 0.4 (*)     Absolute Monocytes 0.5      Absolute Eosinophils 0.1      Absolute Basophils 0.0      Absolute Immature Granulocytes 0.1      Absolute NRBCs 0.0     TROPONIN T, HIGH SENSITIVITY - Normal    Troponin T, High Sensitivity <6     HEPATIC FUNCTION PANEL - Normal    Protein Total 7.2      Albumin 4.3      Bilirubin Total 0.3      Alkaline Phosphatase 111      AST 16      ALT 19      Bilirubin Direct <0.20         CT Abdomen Pelvis  w Contrast   Final Result   IMPRESSION:    1.  Diverticulosis without diverticulitis.   2.  Small left ovarian cyst/follicle.   3.  Hepatic steatosis .      CT Chest Pulmonary Embolism w Contrast    (Results Pending)         PROCEDURES:  Procedures:  Procedures       I, Luis Gill  am serving as a scribe to document services personally performed by Eb Daniel DO, based on my observations and the provider's statements to me.  I, Eb Daniel DO, attest that Luis Gill  is acting in a scribe capacity, has observed my performance of the services and has documented them in accordance with my direction.    Eb Daniel DO  Emergency Medicine  [facility] EMERGENCY DEPARTMENT       Eb Daniel DO  12/27/23 0031

## 2023-12-27 NOTE — PLAN OF CARE
"PRIMARY DIAGNOSIS: \"Tachycardia\" NURSING  OUTPATIENT/OBSERVATION GOALS TO BE MET BEFORE DISCHARGE:  ADLs back to baseline: Yes    Activity and level of assistance: Ambulating independently.    Pain status: Improved-controlled with oral pain medications.    Return to near baseline physical activity: Yes    Vital signs stable: No, Heart rate in 100-110.      Discharge Planner Nurse   Safe discharge environment identified: Yes  Barriers to discharge: Yes       Entered by: Kristin Mccullough RN 12/27/2023 2:28 PM     Please review provider order for any additional goals.   Nurse to notify provider when observation goals have been met and patient is ready for discharge.    Goal Outcome Evaluation:      Plan of Care Reviewed With: patient    Overall Patient Progress: no changeOverall Patient Progress: no change    Outcome Evaluation:   Assumed cares: 7088-7495. Pt A&Ox4. VSS except for elevate HR in 100-110. PIV infusing LR at 100 mL/hr. Pt reports back pain, PRN tylenol provided to pt. Independent       "

## 2023-12-27 NOTE — ED TRIAGE NOTES
Patient arrives from home with .   C/o bilateral lower extremity pain that has been ongoing for past two days. Has been laying around more than usual d/t having a head cold.     Tachycardic in triage.   Hx of DVT and Factor X.

## 2023-12-27 NOTE — PROGRESS NOTES
Patient admitted to room 31 at approximately 1530 via ambulation from emergency room.  Reason for Admission: Tachycardia  Report received from: Jeanne ZAVALA  Patient was accompanied by Spouse.  Discharge transportation provided by:spouse  Patient ambulated/transferred:  independently. self.  Patient is alert and orientated x 3.  Outpatient Observation education provided to: (patient, family, friend)  MDRO Education done if applicable (MRSA, VRE, etc)  Safety risks were identified during admission:  none.   Yellow risk/fall band applied:  No  Detailed Belongings: cell phone, glasses

## 2023-12-27 NOTE — MEDICATION SCRIBE - ADMISSION MEDICATION HISTORY
Medication Scribe Admission Medication History    Admission medication history is complete. The information provided in this note is only as accurate as the sources available at the time of the update.    Information Source(s): Patient via in-person    Pertinent Information: Patient states that She took [place] thyroid 180 mg tablet that was filled incorrectly (suppose to be 120 mg). Patient acknowledge the error and keep taking it   Patient states that she takes Adderall only when she is working    Changes made to PTA medication list:  Added: None  Deleted: Arnuity Ellipta, Norco, Multivitamin (per patient)   Changed: Acetazolamide 250 mg from 1 tablet tid to 2 tablet at bed time and 1 tablet daily prn(per patient)    Medication Affordability:  Not including over the counter (OTC) medications, was there a time in the past 3 months when you did not take your medications as prescribed because of cost?: No    Allergies reviewed with patient and updates made in EHR: yes    Medication History Completed By: Wyatt Senior 12/27/2023 1:24 AM    PTA Med List   Medication Sig Note Last Dose    acetaZOLAMIDE (DIAMOX) 250 MG tablet Take 500 mg by mouth at bedtime  12/25/2023 at hs    acetaZOLAMIDE (DIAMOX) 250 MG tablet Take 250 mg by mouth daily as needed  Unknown at prn    albuterol (PROAIR HFA/PROVENTIL HFA/VENTOLIN HFA) 108 (90 Base) MCG/ACT inhaler Inhale 2 puffs into the lungs as needed  Unknown at prn    amphetamine-dextroamphetamine (ADDERALL XR) 30 MG 24 hr capsule Take 30 mg by mouth daily  12/22/2023 at am    cetirizine (ZYRTEC) 10 MG tablet Take 10 mg by mouth daily prn  Unknown at prn    omeprazole (PRILOSEC) 20 MG DR capsule Take 20 mg by mouth as needed prn  Unknown at prn    pseudoePHEDrine (SUDAFED) 30 MG tablet Take 1 tablet by mouth daily prn  Unknown at prn    thyroid (ARMOUR) 120 MG tablet Take 120 mg by mouth daily 12/27/2023: Patient took 180 mg that the pharmacy filled incorrectly. 12/24/2023 at  am    vitamin D2 (ERGOCALCIFEROL) 01643 units (1250 mcg) capsule Take 1 capsule by mouth every 7 days  12/21/2023 at am    warfarin (COUMADIN) 3 MG tablet Take 3 mg by mouth at bedtime Take 3 mg tablet on Monday, Wednesday, Friday, Saturday and Sunday.  12/25/2023 at hs    warfarin ANTICOAGULANT (COUMADIN) 4 MG tablet Take 4 mg by mouth at bedtime Take 4 mg tablet on Tuesday and Thursday 12/21/2023 at unknown

## 2023-12-27 NOTE — PHARMACY-ANTICOAGULATION SERVICE
Clinical Pharmacy - Warfarin Dosing Consult     Pharmacy has been consulted to manage this patient s warfarin therapy.  Indication: DVT/PE Prophylaxis; Other - specify in comments (Factor V Leiden)  Provider/Team: Dr. Sanabria  Warfarin Prior to Admission: Yes  Warfarin PTA Regimen: 4mg Tuesday & Thursday. 3mg daily on other days  Significant drug interactions: [place] thyroid (home med but was accidentally taking increased dose), omeprazole (home med), and Tamiflu (new med) may increase INR and/or bleed risk  Dose Comments: INR therapeutic. Continue home dose 4mg at 0200    INR   Date Value Ref Range Status   12/27/2023 2.45 (H) 0.85 - 1.15 Final   12/26/2023 2.39 (H) 0.85 - 1.15 Final     Factor 2 Assay   Date Value Ref Range Status   09/01/2005 19 (L) 60 - 140 % Final       Recommend warfarin 4 mg today.  Pharmacy will monitor Anna Nieves daily and order warfarin doses to achieve specified goal.      Please contact pharmacy as soon as possible if the warfarin needs to be held for a procedure or if the warfarin goals change.       Hillary Boyd, PharmD on 12/27/2023 at 1:50 AM

## 2023-12-27 NOTE — H&P
"Admission History and Physical   Anna Nieves,  1970, MRN [id number]    [facility]    PCP: Itzel Young, [phone]          Extended Emergency Contact Information  Primary Emergency Contact: Jamin Nieves   [facility]  Mobile Phone: [phone]  Relation: Spouse  Secondary Emergency Contact: ADDY EDDY  Mobile Phone: [phone]  Relation: Sister       Assessment and Plan     53F with Influenza A who presented because of concern for DVT and kept overnight for rehydration due to sinus tachycardia in the 150s.  Improving with IVF and anticipate discharge tomorrow.    #Influenza A - tamiflu    #Sinus tachycardia - combination of Flu, dehyration, pseudoephedrine use.    -IVF    #Factor V leiden heterozygous, hx DVT - coumadin 4mg    #RENETTA - CPAP    #pseudotumor cerebri - diamox        Auto-populated based on system request - if needs to be addressed in treatment plan they will be addressed above:  \"  Clinically Significant Risk Factors Present on Admission            # Hypomagnesemia: Lowest Mg = 1.6 mg/dL in last 2 days, will replace as needed    # Drug Induced Coagulation Defect: home medication list includes an anticoagulant medication         # Severe Obesity: Estimated body mass index is 42.91 kg/m  as calculated from the following:    Height as of this encounter: 1.626 m (5' 4\").    Weight as of this encounter: 113.4 kg (250 lb).              \"       Chief Complaint: Leg pain     HPI:    Anna Nieves is a 53 year old female factor V leiden heterozygous, hx multiple VTE, ADD, depression, pseudotumor cerebri, RENETTA who presents because of L calf and R thigh pain that resembled prior DVTs.  This was in the setting of several days of fatigue, cough, myalgias, decreased PO intake.      In the ER: flu A positive and tachycardic to 150s.  2L bolus LR with improvement in HR to 120s.  US negative DVT    History is provided by patient and chart review       Physical " "Exam:  Pulse:  [116-154] 121  Resp:  [13-24] 19  BP: (122-158)/(68-88) 127/74  SpO2:  [96 %-100 %] 96 %  /74   Pulse (!) 121   Resp 19   Ht 1.626 m (5' 4\")   Wt 113.4 kg (250 lb)   LMP 12/13/2023 (Exact Date)   SpO2 96%   BMI 42.91 kg/m       General:  Alert, cooperative, no distress,  Appears stated age  Neurologic:  oriented, facialsymmetry preserved, fluent speech. Moves all 4 spontaneously  Psych: calm, mood and affect appropriate to situation  HEENT:  Anicteric, MMM, unremarkable dentition  CV: regular tachycardia no MRG, normal S1 and S2, no edema, no erythema or swelling at site of discomfort.  This has improved with tylenol  Lungs: CTAB.  Easyrespirations  Abd: soft, NT, normoactive BS  Skin: no rashes noted on exposed skin.   Central Lines and Tubes: None (no dobbins, CVC, feeding tubes)         Pertinent Test Findings  Radiology Results (results reviewed):   Results for orders placed or performed during the hospital encounter of 12/26/23   CT Chest Pulmonary Embolism w Contrast    Impression    IMPRESSION:  1.  No evidence of pulmonary embolus or other acute abnormality in the chest.   CT Abdomen Pelvis w Contrast    Impression    IMPRESSION:   1.  Diverticulosis without diverticulitis.  2.  Small left ovarian cyst/follicle.  3.  Hepatic steatosis .   US Lower Extremity Venous Duplex Bilateral    Impression    IMPRESSION:  No deep venous thrombosis in the bilateral lower extremities.       EKG (personally reviewed): sinus tachycardia and no acute ischemic changes      Medical History  Past Medical History:   Diagnosis Date    Deep vein thrombosis (H)     Factor V deficiency (H)     Gastroesophageal reflux disease     Hypothyroidism     Pulmonary embolism (H)     Sleep apnea         Surgical History  Past Surgical History:   Procedure Laterality Date    OPEN REDUCTION INTERNAL FIXATION ORBIT BLOWOUT Left 7/7/2023    Procedure: left orbital floor and medial wall fracture repair with implant;  " Surgeon: Fina Sheppard MD;  Location:  OR    OPTICAL TRACKING SYSTEM ORBITOTOMY Left 7/7/2023    Procedure: LEFT ORBITOTOMY, USING OPTICAL TRACKING SYSTEM;  Surgeon: Fina Sheppard MD;  Location:  OR    wisdom teeth removal            Social History  Social History     Tobacco Use    Smoking status: Never    Smokeless tobacco: Never   Vaping Use    Vaping Use: Never used   Substance Use Topics    Alcohol use: Yes     Comment: 1-2 times per year    Drug use: Never          Allergies  Allergies   Allergen Reactions    Cats Difficulty breathing    Dust Mites Other (See Comments)     Nasal dripping    Mold     Pollen Extract Other (See Comments)     Nasal congestion      Sulfa Antibiotics Hives    Sulfamethoxazole-Trimethoprim Hives    Family History  I have reviewed this patient's family history and updated it with pertinent information if needed.  Family History   Problem Relation Age of Onset    Hypertension Father     Lymphoma Father     Peripheral Vascular Disease Father                  Prior to Admission Medications   Prior to Admission Medications   Prescriptions Last Dose Informant Patient Reported? Taking?   HYDROcodone-acetaminophen (NORCO) 5-325 MG tablet   No No   Sig: Take 1 tablet by mouth every 6 hours as needed for severe pain   Patient not taking: Reported on 10/2/2023   Multiple Vitamin (MULTIVITAMIN ADULT PO)   Yes No   acetaZOLAMIDE (DIAMOX) 250 MG tablet   No No   Sig: Take 1 tablet (250 mg) by mouth 3 times daily   albuterol (PROAIR HFA/PROVENTIL HFA/VENTOLIN HFA) 108 (90 Base) MCG/ACT inhaler   Yes No   Sig: Inhale 2 puffs into the lungs as needed   amphetamine-dextroamphetamine (ADDERALL XR) 30 MG 24 hr capsule   Yes No   Sig: Take 30 mg by mouth daily   cetirizine (ZYRTEC) 10 MG tablet   Yes No   Sig: Take 10 mg by mouth daily prn   fluticasone (ARNUITY ELLIPTA) 100 MCG/ACT inhaler   Yes No   Sig: Spray 2 sprays in nostril daily   Patient not taking: Reported on 10/2/2023    omeprazole (PRILOSEC) 20 MG DR capsule   Yes No   Sig: prn   pseudoePHEDrine (SUDAFED) 30 MG tablet   Yes No   Sig: Take 1 tablet by mouth daily prn   thyroid (ARMOUR) 120 MG tablet   Yes No   Sig: Take 120 mg by mouth daily   vitamin D2 (ERGOCALCIFEROL) 08071 units (1250 mcg) capsule   Yes No   Sig: Take 1 capsule by mouth every 7 days   warfarin (COUMADIN) 3 MG tablet   Yes No   Sig: Take 4 mg by mouth daily       Facility-Administered Medications: None              Pertinent Labs    Most Recent 3 CBC's:  Recent Labs   Lab Test 12/26/23  1941 05/15/23  0616 05/15/23  0012   WBC 8.7 12.2* 13.5*   HGB 12.7 13.0 12.6   MCV 88 92 92    297 276     Most Recent 3 BMP's:  Recent Labs   Lab Test 12/26/23 1941 11/08/23  1548 06/29/23  1240   * 138 140   POTASSIUM 3.8 4.0 4.4   CHLORIDE 104 106 108*   CO2 19* 20* 20*   BUN 8.2 13.5 16.1   CR 0.74 1.11* 0.78   ANIONGAP 11 12 12   JESSICA 8.5* 9.1 8.6   * 99 100*     Most Recent 2 LFT's:  Recent Labs   Lab Test 12/26/23 1941 11/08/23  1548   AST 16 19   ALT 19 12   ALKPHOS 111 97   BILITOTAL 0.3 0.5     Most Recent 3 INR's:  Recent Labs   Lab Test 12/26/23 1941 07/07/23  1101 06/01/23  1130   INR 2.39* 1.66* 4.57*     Most Recent 3 Troponin's:No lab results found.    Medical Decision Making        Medical Decision Making               Antoni Sanabria MD, [facility]  Internal Medicine Hospitalist  12/27/2023

## 2023-12-27 NOTE — CONSULTS
"Care Management Initial Consult    General Information  Assessment completed with: PatientCayetano via phone  Type of CM/SW Visit: Initial Assessment    Primary Care Provider verified and updated as needed: Yes   Readmission within the last 30 days: no previous admission in last 30 days      Reason for Consult: discharge planning  Advance Care Planning: Advance Care Planning Reviewed: no concerns identified          Communication Assessment  Patient's communication style: spoken language (English or Bilingual)                          Living Environment:   People in home: spouse, child(carissa), dependent  Cayetano and  Jamin and 2 adopted sons  Current living Arrangements: house      Able to return to prior arrangements: yes       Family/Social Support:  Care provided by: self  Provides care for: child(carissa) (\"2 adopted sons. One son has autism\")  Marital Status:     Jamin       Description of Support System: Supportive, Involved    Support Assessment: Adequate family and caregiver support, Adequate social supports, Patient communicates needs well met    Current Resources:   Patient receiving home care services: No     Community Resources: None  Equipment currently used at home: none  Supplies currently used at home: Other (\"glasses\")    Employment/Financial:  Employment Status: employed full-time        Financial Concerns:     Referral to Financial Worker: No       Does the patient's insurance plan have a 3 day qualifying hospital stay waiver?  No      Functional Status:  Prior to admission patient needed assistance:   Dependent ADLs:: Independent, Ambulation-no assistive device  Dependent IADLs:: Independent  Assesssment of Functional Status: At functional baseline    Mental Health Status:          Chemical Dependency Status:                Values/Beliefs:  Spiritual, Cultural Beliefs, [employer] Practices, Values that affect care:                 Additional Information:  Cayetano was found to be Flu +. She " lives in a house with her  Jamin and 2 adopted sons. One son has autism.    She is independent with ADLs and IADLs. She drives and works full time.    Likely home at discharge with no new needs.     Family to transport at discharge.    What is Observation was given and discussed. All questions were answered.    CM to follow for medical progression of care, discharge recommendations, and final discharge plan.    Brenda Jerome RN

## 2023-12-27 NOTE — PROGRESS NOTES
"[facility]    Medicine Progress Note - Hospitalist Service    Date of Admission:  12/26/2023    Assessment & Plan   Anna Nieves is a 54-year-old woman with a history of pseudotumor cerebri, factor V Leyden heterozygosity and prior DVT (on warfarin), depression, pseudotumor cerebri, RENETTA and obesity admitted on 12/27/2023 with left calf and right thigh pain.    She was tachycardic in the ER.  Pulmonary CT angiogram was negative for pulmonary embolism or acute cardiopulmonary process.  She received IV fluid and heart rate improved.  Lower extremity venous ultrasound was negative for DVT.  INR is in the therapeutic range.  However, nasal swab was positive for influenza A virus for which she was placed on oseltamivir.    Influenza A infection  Continue oseltamivir  Supplemental oxygen as needed  DuoNebs as needed      Sinus tachycardia  Possibly secondary to influenza virus infection, dehydration and history of pseudoephedrine use.  Continue IV fluid  Consider as needed benzodiazepine for possible sympathetic overactivity.  Decrease IV fluid to 75 mL/h      Factor V Leiden heterozygosity  History of DVT  INR is in therapeutic range  Warfarin as per pharmacist    Pseudotumor cerebri  Continue PTA acetazolamide 500 mg at bedtime      Diet: Combination Diet Regular Diet Adult    DVT Prophylaxis: Warfarin  Martin Catheter: Not present  Lines: None     Cardiac Monitoring: None  Code Status: Full Code      Clinically Significant Risk Factors Present on Admission            # Hypomagnesemia: Lowest Mg = 1.6 mg/dL in last 2 days, will replace as needed    # Drug Induced Coagulation Defect: home medication list includes an anticoagulant medication         # Severe Obesity: Estimated body mass index is 42.91 kg/m  as calculated from the following:    Height as of this encounter: 1.626 m (5' 4\").    Weight as of this encounter: 113.4 kg (250 lb).              Disposition Plan      Expected Discharge " Date: 12/28/2023      Destination: home with family              Dale MD Julissa  Hospitalist Service  [facility]  Securely message with [employer] (more info)  Text page via [employer] Paging/Directory   ______________________________________________________________________    Interval History   No new complaints today no acute event overnight.  Breathing comfortably on room air.  Heart rate has improved-now in the 100s.    Physical Exam   Vital Signs: Temp: 99  F (37.2  C) Temp src: Oral BP: 131/74 Pulse: 110   Resp: 22 SpO2: 100 % O2 Device: None (Room air)    Weight: 250 lbs 0 oz    General appearance: Awake, Alert, Cooperative, not in any obvious distress and appears stated age   HEENT: Normocephalic, atraumatic, conjunctiva clear without icterus and ears without discharge  Lungs: Normal work of breathing  Cardiovascular:No lower extremity edema bilaterally  Abdomen: Soft, non-tender and Non-distended, active bowel sounds  Skin: Skin color, texture normal and bruising or bleeding. No rashes or lesions over face, neck, arms and legs, turgor normal.  Musculoskeletal: No bony deformities or joint tenderness. Normal ROM upon flexion & extension.   Neurologic: Alert & Oriented X 3, Facial symmetry preserved and upper & lower extremities moving well with symmetry  Psychiatric: Calm, normal eye contact and normal affect      Medical Decision Making       40 MINUTES SPENT BY ME on the date of service doing chart review, history, exam, documentation & further activities per the note.      Data   Imaging results reviewed over the past 24 hrs:   Recent Results (from the past 24 hour(s))   CT Chest Pulmonary Embolism w Contrast    Narrative    EXAM: CT CHEST PULMONARY EMBOLISM W CONTRAST  LOCATION: [facility]  DATE: 12/26/2023    INDICATION: tachycardia  COMPARISON: Chest CT 12/19/2017  TECHNIQUE: CT chest pulmonary angiogram during arterial phase injection of IV contrast.  Multiplanar reformats and MIP reconstructions were performed. Dose reduction techniques were used.   CONTRAST: 100ml isovue 370    FINDINGS:  ANGIOGRAM CHEST: Pulmonary arteries are normal caliber and negative for pulmonary emboli. Thoracic aorta is negative for dissection. No CT evidence of right heart strain.    LUNGS AND PLEURA: No focal airspace consolidation or pleural effusion.    MEDIASTINUM/AXILLAE: No suspicious lymphadenopathy.    CORONARY ARTERY CALCIFICATION: None.    UPPER ABDOMEN: Normal.    MUSCULOSKELETAL: No acute bony abnormality.      Impression    IMPRESSION:  1.  No evidence of pulmonary embolus or other acute abnormality in the chest.   CT Abdomen Pelvis w Contrast    Narrative    EXAM: CT ABDOMEN PELVIS W CONTRAST  LOCATION: [facility]  DATE: 12/26/2023    INDICATION: lower abd pain  COMPARISON: 03/13/2023  TECHNIQUE: CT scan of the abdomen and pelvis was performed following injection of IV contrast. Multiplanar reformats were obtained. Dose reduction techniques were used.  CONTRAST: 100ml isovue 370    FINDINGS:   LOWER CHEST: No basilar infiltrates.    HEPATOBILIARY: Mild hepatomegaly, unchanged. Hepatic steatosis. No biliary dilatation    PANCREAS: Unremarkable    SPLEEN: Unremarkable    ADRENAL GLANDS: Unremarkable    KIDNEYS/BLADDER: No hydronephrosis.    BOWEL: Sigmoid diverticulosis. Appendix is normal in caliber.    LYMPH NODES: No significant retroperitoneal adenopathy    VASCULATURE: No abdominal aortic aneurysm    PELVIC ORGANS: 2.1 cm dominant left ovarian follicle. No free fluid.    MUSCULOSKELETAL: No acute bony abnormalities.      Impression    IMPRESSION:   1.  Diverticulosis without diverticulitis.  2.  Small left ovarian cyst/follicle.  3.  Hepatic steatosis .   US Lower Extremity Venous Duplex Bilateral    Narrative    EXAM: US LOWER EXTREMITY VENOUS DUPLEX BILATERAL  LOCATION: [facility]  DATE: 12/26/2023    INDICATION:  Lower extremity pain and swelling.  COMPARISON: CTA chest today. Left leg venous Doppler ultrasound 02/13/2022. Right leg venous Doppler ultrasound 10/17/2018.  TECHNIQUE: Venous Duplex ultrasound of bilateral lower extremities with and without compression, augmentation and duplex. Color flow and spectral Doppler with waveform analysis performed.    FINDINGS: Exam includes the common femoral, femoral, popliteal veins as well as segmentally visualized deep calf veins and greater saphenous vein.     RIGHT: No deep vein thrombosis. No superficial thrombophlebitis. No popliteal cyst.    LEFT: No deep vein thrombosis. No superficial thrombophlebitis. No popliteal cyst.      Impression    IMPRESSION:  No deep venous thrombosis in the bilateral lower extremities.

## 2023-12-28 VITALS
HEIGHT: 64 IN | WEIGHT: 254.19 LBS | HEART RATE: 99 BPM | RESPIRATION RATE: 18 BRPM | SYSTOLIC BLOOD PRESSURE: 137 MMHG | BODY MASS INDEX: 43.4 KG/M2 | DIASTOLIC BLOOD PRESSURE: 69 MMHG | TEMPERATURE: 98.1 F | OXYGEN SATURATION: 96 %

## 2023-12-28 LAB
ANION GAP SERPL CALCULATED.3IONS-SCNC: 12 MMOL/L (ref 7–15)
ATRIAL RATE - MUSE: 149 BPM
BUN SERPL-MCNC: 5.4 MG/DL (ref 6–20)
CALCIUM SERPL-MCNC: 8.7 MG/DL (ref 8.6–10)
CHLORIDE SERPL-SCNC: 110 MMOL/L (ref 98–107)
CREAT SERPL-MCNC: 0.69 MG/DL (ref 0.51–0.95)
DEPRECATED HCO3 PLAS-SCNC: 19 MMOL/L (ref 22–29)
DIASTOLIC BLOOD PRESSURE - MUSE: NORMAL MMHG
EGFRCR SERPLBLD CKD-EPI 2021: >90 ML/MIN/1.73M2
ERYTHROCYTE [DISTWIDTH] IN BLOOD BY AUTOMATED COUNT: 13.7 % (ref 10–15)
GLUCOSE SERPL-MCNC: 100 MG/DL (ref 70–99)
HCT VFR BLD AUTO: 35.5 % (ref 35–47)
HGB BLD-MCNC: 11.7 G/DL (ref 11.7–15.7)
INR PPP: 2.5 (ref 0.85–1.15)
INTERPRETATION ECG - MUSE: NORMAL
MCH RBC QN AUTO: 29.2 PG (ref 26.5–33)
MCHC RBC AUTO-ENTMCNC: 33 G/DL (ref 31.5–36.5)
MCV RBC AUTO: 89 FL (ref 78–100)
P AXIS - MUSE: 81 DEGREES
PLATELET # BLD AUTO: 197 10E3/UL (ref 150–450)
POTASSIUM SERPL-SCNC: 3.9 MMOL/L (ref 3.4–5.3)
PR INTERVAL - MUSE: 172 MS
QRS DURATION - MUSE: 72 MS
QT - MUSE: 254 MS
QTC - MUSE: 400 MS
R AXIS - MUSE: -18 DEGREES
RBC # BLD AUTO: 4.01 10E6/UL (ref 3.8–5.2)
SODIUM SERPL-SCNC: 141 MMOL/L (ref 135–145)
SYSTOLIC BLOOD PRESSURE - MUSE: NORMAL MMHG
T AXIS - MUSE: 52 DEGREES
VENTRICULAR RATE- MUSE: 149 BPM
WBC # BLD AUTO: 5 10E3/UL (ref 4–11)

## 2023-12-28 PROCEDURE — 80048 BASIC METABOLIC PNL TOTAL CA: CPT | Performed by: HOSPITALIST

## 2023-12-28 PROCEDURE — 99239 HOSP IP/OBS DSCHRG MGMT >30: CPT | Performed by: INTERNAL MEDICINE

## 2023-12-28 PROCEDURE — 85027 COMPLETE CBC AUTOMATED: CPT | Performed by: HOSPITALIST

## 2023-12-28 PROCEDURE — 250N000011 HC RX IP 250 OP 636: Performed by: INTERNAL MEDICINE

## 2023-12-28 PROCEDURE — 96375 TX/PRO/DX INJ NEW DRUG ADDON: CPT

## 2023-12-28 PROCEDURE — 258N000003 HC RX IP 258 OP 636: Performed by: INTERNAL MEDICINE

## 2023-12-28 PROCEDURE — 250N000013 HC RX MED GY IP 250 OP 250 PS 637: Performed by: HOSPITALIST

## 2023-12-28 PROCEDURE — G0378 HOSPITAL OBSERVATION PER HR: HCPCS

## 2023-12-28 PROCEDURE — 36415 COLL VENOUS BLD VENIPUNCTURE: CPT | Performed by: HOSPITALIST

## 2023-12-28 PROCEDURE — 85610 PROTHROMBIN TIME: CPT | Performed by: HOSPITALIST

## 2023-12-28 PROCEDURE — 96361 HYDRATE IV INFUSION ADD-ON: CPT

## 2023-12-28 RX ORDER — OSELTAMIVIR PHOSPHATE 75 MG/1
75 CAPSULE ORAL 2 TIMES DAILY
Qty: 6 CAPSULE | Refills: 0 | Status: SHIPPED | OUTPATIENT
Start: 2023-12-29 | End: 2024-01-15

## 2023-12-28 RX ORDER — LORAZEPAM 0.5 MG/1
0.5 TABLET ORAL DAILY PRN
Qty: 10 TABLET | Refills: 0 | Status: SHIPPED | OUTPATIENT
Start: 2023-12-28 | End: 2023-12-28

## 2023-12-28 RX ORDER — GUAIFENESIN 200 MG/10ML
200 LIQUID ORAL EVERY 4 HOURS PRN
Qty: 118 ML | Refills: 0 | Status: SHIPPED | OUTPATIENT
Start: 2023-12-28 | End: 2024-01-15

## 2023-12-28 RX ORDER — LORAZEPAM 2 MG/ML
1 INJECTION INTRAMUSCULAR ONCE
Status: COMPLETED | OUTPATIENT
Start: 2023-12-28 | End: 2023-12-28

## 2023-12-28 RX ORDER — LORAZEPAM 0.5 MG/1
0.5 TABLET ORAL DAILY PRN
Qty: 6 TABLET | Refills: 0 | Status: SHIPPED | OUTPATIENT
Start: 2023-12-28

## 2023-12-28 RX ADMIN — ACETAMINOPHEN 650 MG: 325 TABLET ORAL at 10:41

## 2023-12-28 RX ADMIN — SODIUM CHLORIDE, POTASSIUM CHLORIDE, SODIUM LACTATE AND CALCIUM CHLORIDE: 600; 310; 30; 20 INJECTION, SOLUTION INTRAVENOUS at 10:42

## 2023-12-28 RX ADMIN — LORAZEPAM 1 MG: 2 INJECTION INTRAMUSCULAR; INTRAVENOUS at 10:28

## 2023-12-28 RX ADMIN — OSELTAMIVIR PHOSPHATE 75 MG: 75 CAPSULE ORAL at 08:46

## 2023-12-28 ASSESSMENT — ACTIVITIES OF DAILY LIVING (ADL)
ADLS_ACUITY_SCORE: 23
ADLS_ACUITY_SCORE: 21
ADLS_ACUITY_SCORE: 21
ADLS_ACUITY_SCORE: 23
ADLS_ACUITY_SCORE: 23
ADLS_ACUITY_SCORE: 21
ADLS_ACUITY_SCORE: 21
ADLS_ACUITY_SCORE: 23
ADLS_ACUITY_SCORE: 23

## 2023-12-28 NOTE — PROGRESS NOTES
Pt transferred to P2 via wheelchair accompanied by staff. Belongings sent with pt. Report given to LUCAS Salas.

## 2023-12-28 NOTE — PROGRESS NOTES
Care Management Follow Up    Length of Stay (days): 0    Expected Discharge Date: 12/28/2023 or 12/29/2023     Concerns to be Addressed: hydrating, cardio status    Patient plan of care discussed at interdisciplinary rounds: Yes    Anticipated Discharge Disposition: Home     Anticipated Discharge Services: None  Anticipated Discharge DME: None    Patient/family educated on Medicare website which has current facility and service quality ratings:  n/a  Education Provided on the Discharge Plan:  per care team  Patient/Family in Agreement with the Plan: yes    Referrals Placed by CM/SW:  n/a  Private pay costs discussed: Not applicable    Additional Information:  Patient presenting with Influenza A who presented because of concern for DVT and kept overnight for rehydration due to sinus tachycardia in the 150s.          Social History:  Cayetano was found to be Flu +. She lives in a house with her  Jamin and 2 adopted sons. One son has autism. She is independent with ADLs and IADLs. She drives and works full time. Family to transport at discharge.      12/28/23:  Patient to return home at discharge with support from spouse as needed.        Sana Brandt RN

## 2023-12-28 NOTE — PROGRESS NOTES
PRIMARY DIAGNOSIS: GENERALIZED WEAKNESS    OUTPATIENT/OBSERVATION GOALS TO BE MET BEFORE DISCHARGE  1. Orthostatic performed: N/A    2. Tolerating PO medications: Yes    3. Return to near baseline physical activity: No    4. Cleared for discharge by consultants (if involved): No    Discharge Planner Nurse   Safe discharge environment identified: No  Barriers to discharge: Yes       Entered by: Yobany Burns RN 12/28/2023 4:42 AM    Please review provider order for any additional goals.   Nurse to notify provider when observation goals have been met and patient is ready for discharge.

## 2023-12-28 NOTE — PLAN OF CARE
"PRIMARY DIAGNOSIS: \"GENERIC\" NURSING  OUTPATIENT/OBSERVATION GOALS TO BE MET BEFORE DISCHARGE:  ADLs back to baseline: Yes    Activity and level of assistance: Up with standby assistance.    Pain status: Improved-controlled with oral pain medications.    Return to near baseline physical activity: Yes     Discharge Planner Nurse   Safe discharge environment identified: Yes  Barriers to discharge: Yes        Please review provider order for any additional goals.   Nurse to notify provider when observation goals have been met and patient is ready for discharge.Goal Outcome Evaluation:                        "

## 2023-12-28 NOTE — PROGRESS NOTES
"PRIMARY DIAGNOSIS: GENERALIZED WEAKNESS    OUTPATIENT/OBSERVATION GOALS TO BE MET BEFORE DISCHARGE  1. Orthostatic performed: N/A    2. Tolerating PO medications: Yes    3. Return to near baseline physical activity: No    4. Cleared for discharge by consultants (if involved): No    Discharge Planner Nurse   Safe discharge environment identified: No  Barriers to discharge: Yes       Entered by: Yobany Burns RN 12/28/2023 4:42 AM  Pt reported pain, headache, 5/10. PRN acetaminophen given. Pt reports nausea only when having \"coughing fits\" but resolves when coughing subsides. PRN Robitussin given. Pt slept between cares. Pt tachy during coughing fits but otherwise HR was  overnight at rest. Telemetry NSR overnight. Other VSS.    Yobany Burns RN    Please review provider order for any additional goals.   Nurse to notify provider when observation goals have been met and patient is ready for discharge.  "

## 2023-12-28 NOTE — PROGRESS NOTES
"[facility]    Medicine Progress Note - Hospitalist Service    Date of Admission:  12/26/2023    Assessment & Plan   Anna Nieves is a 54-year-old woman with a history of pseudotumor cerebri, factor V Leyden heterozygosity and prior DVT (on warfarin), depression, pseudotumor cerebri, RENETTA and obesity admitted on 12/27/2023 with left calf and right thigh pain.    She was tachycardic in the ER.  Pulmonary CT angiogram was negative for pulmonary embolism or acute cardiopulmonary process.  She received IV fluid and heart rate improved.  Lower extremity venous ultrasound was negative for DVT.  INR is in the therapeutic range.  However, nasal swab was positive for influenza A virus for which she was placed on oseltamivir.    Influenza A infection  Continue oseltamivir  Supplemental oxygen as needed  DuoNebs as needed      Sinus tachycardia  Possibly secondary to sympathetic overactivity from pseudoephedrine use, influenza virus infection, and dehydration    Stop IV fluid  Received Ativan 1 mg IV earlier this morning  Consider starting metoprolol  Continue telemetry    Factor V Leiden heterozygosity  History of DVT  INR is in therapeutic range  Warfarin as per pharmacist    Pseudotumor cerebri  Continue PTA acetazolamide 500 mg at bedtime      Diet: Combination Diet Regular Diet Adult    DVT Prophylaxis: Warfarin  Amrtin Catheter: Not present  Lines: None     Cardiac Monitoring: None  Code Status: Full Code      Clinically Significant Risk Factors Present on Admission            # Hypomagnesemia: Lowest Mg = 1.6 mg/dL in last 2 days, will replace as needed      # Drug Induced Coagulation Defect: home medication list includes an anticoagulant medication         # Severe Obesity: Estimated body mass index is 43.63 kg/m  as calculated from the following:    Height as of this encounter: 1.626 m (5' 4\").    Weight as of this encounter: 115.3 kg (254 lb 3.1 oz).              Disposition Plan    "   Expected Discharge Date: 12/28/2023      Destination: home with family          Possible discharge today or tomorrow depending on heart rate.    Dale Costa MD  Hospitalist Service  [facility]  Securely message with [employer] (more info)  Text page via [employer] Paging/Directory   ______________________________________________________________________    Interval History   No new complaints today no acute event overnight.  She remains tachycardic with heart rate in the 110s to 120s.  Heart rate improved after receiving Ativan.  She states she did not sleep well overnight because she had pain in her back breathing comfortably on room air.  Heart rate has improved-now in the 90-100s.    Physical Exam   Vital Signs: Temp: 98.4  F (36.9  C) Temp src: Oral BP: 132/82 Pulse: 106   Resp: 18 SpO2: 94 % O2 Device: None (Room air)    Weight: 254 lbs 3.05 oz    General appearance: Awake, Alert, Cooperative, not in any obvious distress and appears stated age   HEENT: Normocephalic, atraumatic, conjunctiva clear without icterus and ears without discharge  Lungs: Normal work of breathing  Cardiovascular:No lower extremity edema bilaterally  Abdomen: Soft, non-tender and Non-distended, active bowel sounds  Skin: Skin color, texture normal and bruising or bleeding. No rashes or lesions over face, neck, arms and legs, turgor normal.  Musculoskeletal: No bony deformities or joint tenderness. Normal ROM upon flexion & extension.   Neurologic: Alert & Oriented X 3, Facial symmetry preserved and upper & lower extremities moving well with symmetry  Psychiatric: Calm, normal eye contact and normal affect      Medical Decision Making       40 MINUTES SPENT BY ME on the date of service doing chart review, history, exam, documentation & further activities per the note.      Data   Imaging results reviewed over the past 24 hrs:   No results found for this or any previous visit (from the past 24 hour(s)).

## 2023-12-28 NOTE — DISCHARGE SUMMARY
"[facility]  Hospitalist Discharge Summary      Date of Admission:  12/26/2023  Date of Discharge:  12/28/2023  Discharging Provider: Dale Costa MD  Discharge Service: Hospitalist Service    Discharge Diagnoses   Sinus tachycardia  Suspected sympathetic overactivity  Influenza A virus infection  Left calf and right thigh pain      Clinically Significant Risk Factors     # Severe Obesity: Estimated body mass index is 43.63 kg/m  as calculated from the following:    Height as of this encounter: 1.626 m (5' 4\").    Weight as of this encounter: 115.3 kg (254 lb 3.1 oz).       Follow-ups Needed After Discharge   Follow-up Appointments     Follow-up and recommended labs and tests       Follow up with primary care provider, Itzel Young, within 7 days for   hospital follow- up.  Continue oseltamavir for the next 3 days.  Avoid   using pseudoephedrine in the future due to concern for sympathetic   overactivity.          Discharge Disposition   Discharged to home  Condition at discharge: Stable    Hospital Course   Anna Nieves is a 54-year-old woman with a history of pseudotumor cerebri, factor V Leyden heterozygosity complicated by multiple episodes of DVTs (cerebral venous thrombosis in the puerperium, DVT in both arms and left leg, on warfarin), depression, pseudotumor cerebri, RENETTA and obesity admitted on 12/27/2023 with left calf and right thigh pain concerning for DVT.      She was tachycardic in the ER. Pulmonary CT angiogram was negative for pulmonary embolism or acute cardiopulmonary process. Lower extremity venous ultrasound was negative for DVT.  INR is in the therapeutic range.  However, nasal swab was positive for influenza A virus for which she was placed on oseltamivir.  She received IV fluid and 1 dose of IV lorazepam due to concern for sympathetic overactivity secondary to pseudoephedrine use.  Sinus tachycardia resolved.  Patient is currently asymptomatic.  She is " clinically stable for discharge at this time.    Consultations This Hospital Stay   PHARMACY TO DOSE WARFARIN  CARE MANAGEMENT / SOCIAL WORK IP CONSULT    Code Status   Full Code    Time Spent on this Encounter   I, Dale Costa MD, personally saw the patient today and spent greater than 30 minutes discharging this patient.       Dale Costa MD  [address] [address]  Phone: [phone]  Fax: [phone]  ______________________________________________________________________    Physical Exam   Vital Signs: Temp: 98.1  F (36.7  C) Temp src: Oral BP: 137/69 Pulse: 99   Resp: 18 SpO2: 96 % O2 Device: None (Room air)    Weight: 254 lbs 3.05 oz    General appearance: Awake, Alert, Cooperative, not in any obvious distress and appears stated age   HEENT: Normocephalic, atraumatic, conjunctiva clear without icterus and ears without discharge  Lungs: Clear to auscultation bilaterally, no wheezing, good air exchange, normal work of breathing  Cardiovascular: Regular Rate and Rythm, normal apical impulse, normal S1 and S2, no lower extremity edema bilaterally  Abdomen: Soft, non-tender and Non-distended, active bowel sounds  Skin: Skin color, texture normal and bruising or bleeding. No rashes or lesions over face, neck, arms and legs, turgor normal.  Musculoskeletal: No bony deformities or joint tenderness. Normal ROM upon flexion & extension.   Neurologic: Alert & Oriented X 3, Facial symmetry preserved and upper & lower extremities moving well with symmetry  Psychiatric: Calm, normal eye contact and normal affect         Primary Care Physician   Itzel Young    Discharge Orders      Reason for your hospital stay    Sinus tachycardia  Suspected sympathetic overactivity  Influenza A virus infection  Left calf and right thigh pain     Follow-up and recommended labs and tests     Follow up with primary care provider, Itzel Young, within 7 days for  hospital follow- up.  Continue oseltamavir for the next 3 days.  Avoid using pseudoephedrine in the future due to concern for sympathetic overactivity.     Activity    Your activity upon discharge: activity as tolerated     Diet    Follow this diet upon discharge: Orders Placed This Encounter      Combination Diet Regular Diet Adult       Significant Results and Procedures   Results for orders placed or performed during the hospital encounter of 12/26/23   CT Chest Pulmonary Embolism w Contrast    Narrative    EXAM: CT CHEST PULMONARY EMBOLISM W CONTRAST  LOCATION: [facility]  DATE: 12/26/2023    INDICATION: tachycardia  COMPARISON: Chest CT 12/19/2017  TECHNIQUE: CT chest pulmonary angiogram during arterial phase injection of IV contrast. Multiplanar reformats and MIP reconstructions were performed. Dose reduction techniques were used.   CONTRAST: 100ml isovue 370    FINDINGS:  ANGIOGRAM CHEST: Pulmonary arteries are normal caliber and negative for pulmonary emboli. Thoracic aorta is negative for dissection. No CT evidence of right heart strain.    LUNGS AND PLEURA: No focal airspace consolidation or pleural effusion.    MEDIASTINUM/AXILLAE: No suspicious lymphadenopathy.    CORONARY ARTERY CALCIFICATION: None.    UPPER ABDOMEN: Normal.    MUSCULOSKELETAL: No acute bony abnormality.      Impression    IMPRESSION:  1.  No evidence of pulmonary embolus or other acute abnormality in the chest.   CT Abdomen Pelvis w Contrast    Narrative    EXAM: CT ABDOMEN PELVIS W CONTRAST  LOCATION: [facility]  DATE: 12/26/2023    INDICATION: lower abd pain  COMPARISON: 03/13/2023  TECHNIQUE: CT scan of the abdomen and pelvis was performed following injection of IV contrast. Multiplanar reformats were obtained. Dose reduction techniques were used.  CONTRAST: 100ml isovue 370    FINDINGS:   LOWER CHEST: No basilar infiltrates.    HEPATOBILIARY: Mild hepatomegaly, unchanged. Hepatic  steatosis. No biliary dilatation    PANCREAS: Unremarkable    SPLEEN: Unremarkable    ADRENAL GLANDS: Unremarkable    KIDNEYS/BLADDER: No hydronephrosis.    BOWEL: Sigmoid diverticulosis. Appendix is normal in caliber.    LYMPH NODES: No significant retroperitoneal adenopathy    VASCULATURE: No abdominal aortic aneurysm    PELVIC ORGANS: 2.1 cm dominant left ovarian follicle. No free fluid.    MUSCULOSKELETAL: No acute bony abnormalities.      Impression    IMPRESSION:   1.  Diverticulosis without diverticulitis.  2.  Small left ovarian cyst/follicle.  3.  Hepatic steatosis .   US Lower Extremity Venous Duplex Bilateral    Narrative    EXAM: US LOWER EXTREMITY VENOUS DUPLEX BILATERAL  LOCATION: [facility]  DATE: 12/26/2023    INDICATION: Lower extremity pain and swelling.  COMPARISON: CTA chest today. Left leg venous Doppler ultrasound 02/13/2022. Right leg venous Doppler ultrasound 10/17/2018.  TECHNIQUE: Venous Duplex ultrasound of bilateral lower extremities with and without compression, augmentation and duplex. Color flow and spectral Doppler with waveform analysis performed.    FINDINGS: Exam includes the common femoral, femoral, popliteal veins as well as segmentally visualized deep calf veins and greater saphenous vein.     RIGHT: No deep vein thrombosis. No superficial thrombophlebitis. No popliteal cyst.    LEFT: No deep vein thrombosis. No superficial thrombophlebitis. No popliteal cyst.      Impression    IMPRESSION:  No deep venous thrombosis in the bilateral lower extremities.       Discharge Medications   Current Discharge Medication List        START taking these medications    Details   guaiFENesin (ROBITUSSIN) 20 mg/mL liquid Take 10 mLs (200 mg) by mouth every 4 hours as needed for other (secretion expectorant)  Qty: 118 mL, Refills: 0    Associated Diagnoses: Tachycardia; Cerebral venous thrombosis in puerperium; Factor 5 Leiden mutation, heterozygous (H24); Thromboembolic  disorder (H); History of chronic urticaria; Injury of head, initial encounter; Closed fracture of orbital floor, unspecified laterality, initial encounter (H)      LORazepam (ATIVAN) 0.5 MG tablet Take 1 tablet (0.5 mg) by mouth daily as needed for anxiety or withdrawal  Qty: 10 tablet, Refills: 0    Associated Diagnoses: Tachycardia; Cerebral venous thrombosis in puerperium; Factor 5 Leiden mutation, heterozygous (H24); Thromboembolic disorder (H); History of chronic urticaria; Injury of head, initial encounter; Closed fracture of orbital floor, unspecified laterality, initial encounter (H)      oseltamivir (TAMIFLU) 75 MG capsule Take 1 capsule (75 mg) by mouth 2 times daily  Qty: 6 capsule, Refills: 0    Associated Diagnoses: Tachycardia; Cerebral venous thrombosis in puerperium; Factor 5 Leiden mutation, heterozygous (H24); Thromboembolic disorder (H); History of chronic urticaria; Injury of head, initial encounter; Closed fracture of orbital floor, unspecified laterality, initial encounter (H)           CONTINUE these medications which have NOT CHANGED    Details   !! acetaZOLAMIDE (DIAMOX) 250 MG tablet Take 500 mg by mouth at bedtime      !! acetaZOLAMIDE (DIAMOX) 250 MG tablet Take 250 mg by mouth daily as needed      albuterol (PROAIR HFA/PROVENTIL HFA/VENTOLIN HFA) 108 (90 Base) MCG/ACT inhaler Inhale 2 puffs into the lungs as needed      amphetamine-dextroamphetamine (ADDERALL XR) 30 MG 24 hr capsule Take 30 mg by mouth daily      cetirizine (ZYRTEC) 10 MG tablet Take 10 mg by mouth daily prn      omeprazole (PRILOSEC) 20 MG DR capsule Take 20 mg by mouth as needed prn      thyroid (ARMOUR) 120 MG tablet Take 120 mg by mouth daily      vitamin D2 (ERGOCALCIFEROL) 78795 units (1250 mcg) capsule Take 1 capsule by mouth every 7 days  Refills: 1      !! warfarin (COUMADIN) 3 MG tablet Take 3 mg by mouth at bedtime Take 3 mg tablet on Monday, Wednesday, Friday, Saturday and Sunday.      !! warfarin  ANTICOAGULANT (COUMADIN) 4 MG tablet Take 4 mg by mouth at bedtime Take 4 mg tablet on Tuesday and Thursday       !! - Potential duplicate medications found. Please discuss with provider.        STOP taking these medications       pseudoePHEDrine (SUDAFED) 30 MG tablet Comments:   Reason for Stopping:             Allergies   Allergies   Allergen Reactions    Cats Difficulty breathing    Dust Mites Other (See Comments)     Nasal dripping    Mold     Pollen Extract Other (See Comments)     Nasal congestion      Sulfa Antibiotics Hives    Sulfamethoxazole-Trimethoprim Hives

## 2023-12-28 NOTE — UTILIZATION REVIEW
Concurrent stay review; Secondary Review Determination     Under the authority of the Utilization Management Committee, the utilization review process indicated a secondary review on Anna Nieves.  The review outcome is based on review of the medical records, discussions with staff, and applying clinical experience noted on the date of the review.        (x) Observation Status Appropriate - Concurrent stay review    RATIONALE FOR DETERMINATION   Anna Nievse is a 53 yr old female with pseudotumor cerebri, factor V heterozygosity and prior DVT as well as RENETTA and obesity who presented with thigh pain/calf pain.  Noted no DVT or PE.  Influenza A positive however and sinus tachycardia but no hypoxia.  Oseltamivir and supportive cares with IVF now stopped.  Ativan did help some tachycardia.  Monitoring HR further.    Patient is clinically improving and there is no clear indication to change patient's status to inpatient. The severity of illness, intensity of service provided, expected LOS and risk for adverse outcome make the care appropriate for observation.    The information on this document is developed by the utilization review team in order for the business office to ensure compliance.  This only denotes the appropriateness of proper admission status and does not reflect the quality of care rendered.         The definitions of Inpatient Status and Observation Status used in making the determination above are those provided in the CMS Coverage Manual, Chapter 1 and Chapter 6, section 70.4.      Sincerely,   Jennifer Loyd MD  Utilization Review  Physician Advisor  [facility]

## 2023-12-28 NOTE — PROGRESS NOTES
PRIMARY DIAGNOSIS: GENERALIZED WEAKNESS    OUTPATIENT/OBSERVATION GOALS TO BE MET BEFORE DISCHARGE  1. Orthostatic performed: N/A    2. Tolerating PO medications: Yes    3. Return to near baseline physical activity: No    4. Cleared for discharge by consultants (if involved): No    Discharge Planner Nurse   Safe discharge environment identified: No  Barriers to discharge: Yes       Entered by: Yobany Burns RN 12/28/2023 4:41 AM    Please review provider order for any additional goals.   Nurse to notify provider when observation goals have been met and patient is ready for discharge.

## 2023-12-28 NOTE — PLAN OF CARE
"PRIMARY DIAGNOSIS: \"GENERIC\" NURSING  OUTPATIENT/OBSERVATION GOALS TO BE MET BEFORE DISCHARGE:  ADLs back to baseline: Yes    Activity and level of assistance: Up with standby assistance.    Pain status: Improved-controlled with oral pain medications.    Return to near baseline physical activity: Yes     Discharge Planner Nurse   Safe discharge environment identified: Yes  Barriers to discharge: Yes       Entered by: Ivonne Carrera RN 12/28/2023 3:00 PM     Please review provider order for any additional goals.   Nurse to notify provider when observation goals have been met and patient is ready for discharge.Goal Outcome Evaluation:         A/Ox4. VSS ex elevated BP and occasional tachycardia. Tele NSR. Up SBA. L PIV infusing IVF. Droplet precautions maintained. Nursing to continue to monitor.                "

## 2023-12-29 NOTE — PLAN OF CARE
Goal Outcome Evaluation:    Problem: Infection  Goal: Absence of Infection Signs and Symptoms  Outcome: Progressing       No pain reported. Patient asymptomatic. Tele- NSR. HR sustained in 90s this afternoon. Pt deemed ready for discharge per Dr. Costa. Discharge instructions and medications gone over with patient and her . Questions answered. Pt to  prescriptions tonight. PIV removed. Patient and  walked out together.     Brittany Marrero RN

## 2024-01-15 ENCOUNTER — APPOINTMENT (OUTPATIENT)
Dept: CT IMAGING | Facility: HOSPITAL | Age: 54
DRG: 871 | End: 2024-01-15
Attending: EMERGENCY MEDICINE
Payer: COMMERCIAL

## 2024-01-15 ENCOUNTER — HOSPITAL ENCOUNTER (INPATIENT)
Facility: HOSPITAL | Age: 54
LOS: 4 days | Discharge: HOME | DRG: 871 | End: 2024-01-19
Attending: EMERGENCY MEDICINE | Admitting: STUDENT IN AN ORGANIZED HEALTH CARE EDUCATION/TRAINING PROGRAM
Payer: COMMERCIAL

## 2024-01-15 ENCOUNTER — APPOINTMENT (OUTPATIENT)
Dept: ULTRASOUND IMAGING | Facility: HOSPITAL | Age: 54
DRG: 871 | End: 2024-01-15
Attending: EMERGENCY MEDICINE
Payer: COMMERCIAL

## 2024-01-15 ENCOUNTER — APPOINTMENT (OUTPATIENT)
Dept: RADIOLOGY | Facility: HOSPITAL | Age: 54
DRG: 871 | End: 2024-01-15
Attending: EMERGENCY MEDICINE
Payer: COMMERCIAL

## 2024-01-15 DIAGNOSIS — A41.9 SEPSIS WITH ACUTE ORGAN DYSFUNCTION WITHOUT SEPTIC SHOCK, DUE TO UNSPECIFIED ORGANISM, UNSPECIFIED ORGAN DYSFUNCTION TYPE (H): Primary | ICD-10-CM

## 2024-01-15 DIAGNOSIS — R65.21 SEPTIC SHOCK (H): ICD-10-CM

## 2024-01-15 DIAGNOSIS — S02.30XA CLOSED FRACTURE OF ORBITAL FLOOR, UNSPECIFIED LATERALITY, INITIAL ENCOUNTER (H): ICD-10-CM

## 2024-01-15 DIAGNOSIS — A41.9 SEPTIC SHOCK (H): ICD-10-CM

## 2024-01-15 DIAGNOSIS — R79.89 ELEVATED LFTS: ICD-10-CM

## 2024-01-15 DIAGNOSIS — J01.00 ACUTE MAXILLARY SINUSITIS, RECURRENCE NOT SPECIFIED: ICD-10-CM

## 2024-01-15 DIAGNOSIS — R65.20 SEPSIS WITH ACUTE ORGAN DYSFUNCTION WITHOUT SEPTIC SHOCK, DUE TO UNSPECIFIED ORGANISM, UNSPECIFIED ORGAN DYSFUNCTION TYPE (H): Primary | ICD-10-CM

## 2024-01-15 DIAGNOSIS — R00.0 SINUS TACHYCARDIA: ICD-10-CM

## 2024-01-15 DIAGNOSIS — B37.31 RECURRENT CANDIDIASIS OF VAGINA: ICD-10-CM

## 2024-01-15 PROBLEM — D68.2 FACTOR V DEFICIENCY (H): Status: ACTIVE | Noted: 2024-01-15

## 2024-01-15 PROBLEM — E83.42 HYPOMAGNESEMIA: Status: ACTIVE | Noted: 2024-01-15

## 2024-01-15 PROBLEM — E66.01 MORBID (SEVERE) OBESITY DUE TO EXCESS CALORIES (H): Status: ACTIVE | Noted: 2024-01-15

## 2024-01-15 PROBLEM — Z86.711 PERSONAL HISTORY OF PULMONARY EMBOLISM: Status: ACTIVE | Noted: 2024-01-15

## 2024-01-15 PROBLEM — G93.2 PSEUDOTUMOR CEREBRI: Status: ACTIVE | Noted: 2024-01-15

## 2024-01-15 PROBLEM — F33.1 MAJOR DEPRESSIVE DISORDER, RECURRENT, MODERATE (H): Status: ACTIVE | Noted: 2024-01-15

## 2024-01-15 PROBLEM — J30.89 OTHER ALLERGIC RHINITIS: Status: ACTIVE | Noted: 2024-01-15

## 2024-01-15 PROBLEM — Z79.01 WARFARIN ANTICOAGULATION: Status: ACTIVE | Noted: 2024-01-15

## 2024-01-15 PROBLEM — L50.9 URTICARIA: Status: ACTIVE | Noted: 2024-01-15

## 2024-01-15 PROBLEM — E03.9 ACQUIRED HYPOTHYROIDISM: Status: ACTIVE | Noted: 2024-01-15

## 2024-01-15 PROBLEM — F90.0 ATTENTION DEFICIT HYPERACTIVITY DISORDER (ADHD), PREDOMINANTLY INATTENTIVE TYPE: Status: ACTIVE | Noted: 2024-01-15

## 2024-01-15 PROBLEM — R51.9 HEADACHE, UNSPECIFIED: Status: ACTIVE | Noted: 2024-01-15

## 2024-01-15 PROBLEM — E55.9 VITAMIN D DEFICIENCY: Status: ACTIVE | Noted: 2024-01-15

## 2024-01-15 LAB
ALBUMIN SERPL BCG-MCNC: 3 G/DL (ref 3.5–5.2)
ALBUMIN UR-MCNC: 20 MG/DL
ALP SERPL-CCNC: 178 U/L (ref 40–150)
ALT SERPL W P-5'-P-CCNC: 226 U/L (ref 0–50)
ANION GAP SERPL CALCULATED.3IONS-SCNC: 18 MMOL/L (ref 7–15)
APPEARANCE UR: ABNORMAL
AST SERPL W P-5'-P-CCNC: 234 U/L (ref 0–45)
BACTERIA #/AREA URNS HPF: ABNORMAL /HPF
BILIRUB DIRECT SERPL-MCNC: 2.61 MG/DL (ref 0–0.3)
BILIRUB SERPL-MCNC: 3.2 MG/DL
BILIRUB UR QL STRIP: NEGATIVE
BUN SERPL-MCNC: 22.9 MG/DL (ref 6–20)
CALCIUM SERPL-MCNC: 8.2 MG/DL (ref 8.6–10)
CHLORIDE SERPL-SCNC: 100 MMOL/L (ref 98–107)
COLOR UR AUTO: YELLOW
CREAT SERPL-MCNC: 1.39 MG/DL (ref 0.51–0.95)
DEPRECATED HCO3 PLAS-SCNC: 15 MMOL/L (ref 22–29)
EGFRCR SERPLBLD CKD-EPI 2021: 45 ML/MIN/1.73M2
ERYTHROCYTE [DISTWIDTH] IN BLOOD BY AUTOMATED COUNT: 13.5 % (ref 10–15)
ETHANOL SERPL-MCNC: <0.01 G/DL
FLUAV RNA SPEC QL NAA+PROBE: NEGATIVE
FLUBV RNA RESP QL NAA+PROBE: NEGATIVE
GLUCOSE SERPL-MCNC: 208 MG/DL (ref 70–99)
GLUCOSE UR STRIP-MCNC: NEGATIVE MG/DL
HAV IGM SERPL QL IA: NONREACTIVE
HBA1C MFR BLD: 5.6 %
HBV CORE IGM SERPL QL IA: NONREACTIVE
HBV SURFACE AG SERPL QL IA: NONREACTIVE
HCG SERPL QL: NEGATIVE
HCT VFR BLD AUTO: 38.6 % (ref 35–47)
HCV AB SERPL QL IA: NONREACTIVE
HGB BLD-MCNC: 13 G/DL (ref 11.7–15.7)
HGB UR QL STRIP: ABNORMAL
HOLD SPECIMEN: NORMAL
INR PPP: 2.63 (ref 0.85–1.15)
INR PPP: 2.68 (ref 0.85–1.15)
KETONES UR STRIP-MCNC: NEGATIVE MG/DL
LACTATE SERPL-SCNC: 2 MMOL/L (ref 0.7–2)
LACTATE SERPL-SCNC: 2.2 MMOL/L (ref 0.7–2)
LACTATE SERPL-SCNC: 3.2 MMOL/L (ref 0.7–2)
LACTATE SERPL-SCNC: 5.8 MMOL/L (ref 0.7–2)
LEUKOCYTE ESTERASE UR QL STRIP: ABNORMAL
LIPASE SERPL-CCNC: 17 U/L (ref 13–60)
MCH RBC QN AUTO: 29.3 PG (ref 26.5–33)
MCHC RBC AUTO-ENTMCNC: 33.7 G/DL (ref 31.5–36.5)
MCV RBC AUTO: 87 FL (ref 78–100)
MUCOUS THREADS #/AREA URNS LPF: PRESENT /LPF
NITRATE UR QL: NEGATIVE
PH UR STRIP: 5.5 [PH] (ref 5–7)
PLATELET # BLD AUTO: 206 10E3/UL (ref 150–450)
POTASSIUM SERPL-SCNC: 3.6 MMOL/L (ref 3.4–5.3)
PROCALCITONIN SERPL IA-MCNC: 7.46 NG/ML
PROT SERPL-MCNC: 6.3 G/DL (ref 6.4–8.3)
RBC # BLD AUTO: 4.43 10E6/UL (ref 3.8–5.2)
RBC URINE: 6 /HPF
RSV RNA SPEC NAA+PROBE: NEGATIVE
SARS-COV-2 RNA RESP QL NAA+PROBE: NEGATIVE
SODIUM SERPL-SCNC: 133 MMOL/L (ref 135–145)
SP GR UR STRIP: 1.02 (ref 1–1.03)
SQUAMOUS EPITHELIAL: 4 /HPF
TROPONIN T SERPL HS-MCNC: 10 NG/L
TROPONIN T SERPL HS-MCNC: 10 NG/L
TSH SERPL DL<=0.005 MIU/L-ACNC: 3.16 UIU/ML (ref 0.3–4.2)
UROBILINOGEN UR STRIP-MCNC: <2 MG/DL
WBC # BLD AUTO: 11.6 10E3/UL (ref 4–11)
WBC CLUMPS #/AREA URNS HPF: PRESENT /HPF
WBC URINE: 90 /HPF

## 2024-01-15 PROCEDURE — 87637 SARSCOV2&INF A&B&RSV AMP PRB: CPT | Performed by: EMERGENCY MEDICINE

## 2024-01-15 PROCEDURE — 71046 X-RAY EXAM CHEST 2 VIEWS: CPT

## 2024-01-15 PROCEDURE — 87086 URINE CULTURE/COLONY COUNT: CPT | Performed by: EMERGENCY MEDICINE

## 2024-01-15 PROCEDURE — 84443 ASSAY THYROID STIM HORMONE: CPT | Performed by: EMERGENCY MEDICINE

## 2024-01-15 PROCEDURE — 250N000011 HC RX IP 250 OP 636: Performed by: EMERGENCY MEDICINE

## 2024-01-15 PROCEDURE — 36415 COLL VENOUS BLD VENIPUNCTURE: CPT | Performed by: STUDENT IN AN ORGANIZED HEALTH CARE EDUCATION/TRAINING PROGRAM

## 2024-01-15 PROCEDURE — 96366 THER/PROPH/DIAG IV INF ADDON: CPT

## 2024-01-15 PROCEDURE — 85610 PROTHROMBIN TIME: CPT | Performed by: EMERGENCY MEDICINE

## 2024-01-15 PROCEDURE — 258N000003 HC RX IP 258 OP 636: Performed by: EMERGENCY MEDICINE

## 2024-01-15 PROCEDURE — 84484 ASSAY OF TROPONIN QUANT: CPT | Performed by: STUDENT IN AN ORGANIZED HEALTH CARE EDUCATION/TRAINING PROGRAM

## 2024-01-15 PROCEDURE — 74177 CT ABD & PELVIS W/CONTRAST: CPT

## 2024-01-15 PROCEDURE — P9047 ALBUMIN (HUMAN), 25%, 50ML: HCPCS | Performed by: STUDENT IN AN ORGANIZED HEALTH CARE EDUCATION/TRAINING PROGRAM

## 2024-01-15 PROCEDURE — 83690 ASSAY OF LIPASE: CPT | Performed by: EMERGENCY MEDICINE

## 2024-01-15 PROCEDURE — 120N000001 HC R&B MED SURG/OB

## 2024-01-15 PROCEDURE — 82248 BILIRUBIN DIRECT: CPT | Performed by: EMERGENCY MEDICINE

## 2024-01-15 PROCEDURE — 85610 PROTHROMBIN TIME: CPT | Performed by: STUDENT IN AN ORGANIZED HEALTH CARE EDUCATION/TRAINING PROGRAM

## 2024-01-15 PROCEDURE — 87040 BLOOD CULTURE FOR BACTERIA: CPT | Performed by: EMERGENCY MEDICINE

## 2024-01-15 PROCEDURE — 83036 HEMOGLOBIN GLYCOSYLATED A1C: CPT | Performed by: STUDENT IN AN ORGANIZED HEALTH CARE EDUCATION/TRAINING PROGRAM

## 2024-01-15 PROCEDURE — 80053 COMPREHEN METABOLIC PANEL: CPT | Performed by: STUDENT IN AN ORGANIZED HEALTH CARE EDUCATION/TRAINING PROGRAM

## 2024-01-15 PROCEDURE — 84703 CHORIONIC GONADOTROPIN ASSAY: CPT | Performed by: EMERGENCY MEDICINE

## 2024-01-15 PROCEDURE — 250N000013 HC RX MED GY IP 250 OP 250 PS 637: Performed by: STUDENT IN AN ORGANIZED HEALTH CARE EDUCATION/TRAINING PROGRAM

## 2024-01-15 PROCEDURE — 250N000011 HC RX IP 250 OP 636: Performed by: STUDENT IN AN ORGANIZED HEALTH CARE EDUCATION/TRAINING PROGRAM

## 2024-01-15 PROCEDURE — 93005 ELECTROCARDIOGRAM TRACING: CPT | Performed by: EMERGENCY MEDICINE

## 2024-01-15 PROCEDURE — 36415 COLL VENOUS BLD VENIPUNCTURE: CPT | Performed by: EMERGENCY MEDICINE

## 2024-01-15 PROCEDURE — 999N000127 HC STATISTIC PERIPHERAL IV START W US GUIDANCE

## 2024-01-15 PROCEDURE — 999N000285 HC STATISTIC VASC ACCESS LAB DRAW WITH PIV START

## 2024-01-15 PROCEDURE — 83605 ASSAY OF LACTIC ACID: CPT | Performed by: EMERGENCY MEDICINE

## 2024-01-15 PROCEDURE — 93308 TTE F-UP OR LMTD: CPT

## 2024-01-15 PROCEDURE — 80074 ACUTE HEPATITIS PANEL: CPT | Performed by: STUDENT IN AN ORGANIZED HEALTH CARE EDUCATION/TRAINING PROGRAM

## 2024-01-15 PROCEDURE — 82077 ASSAY SPEC XCP UR&BREATH IA: CPT | Performed by: EMERGENCY MEDICINE

## 2024-01-15 PROCEDURE — 96365 THER/PROPH/DIAG IV INF INIT: CPT | Mod: 59

## 2024-01-15 PROCEDURE — 258N000003 HC RX IP 258 OP 636: Performed by: STUDENT IN AN ORGANIZED HEALTH CARE EDUCATION/TRAINING PROGRAM

## 2024-01-15 PROCEDURE — 76705 ECHO EXAM OF ABDOMEN: CPT

## 2024-01-15 PROCEDURE — 81001 URINALYSIS AUTO W/SCOPE: CPT | Performed by: EMERGENCY MEDICINE

## 2024-01-15 PROCEDURE — 99285 EMERGENCY DEPT VISIT HI MDM: CPT | Mod: 25

## 2024-01-15 PROCEDURE — 250N000013 HC RX MED GY IP 250 OP 250 PS 637: Performed by: EMERGENCY MEDICINE

## 2024-01-15 PROCEDURE — 84145 PROCALCITONIN (PCT): CPT | Performed by: STUDENT IN AN ORGANIZED HEALTH CARE EDUCATION/TRAINING PROGRAM

## 2024-01-15 PROCEDURE — 85041 AUTOMATED RBC COUNT: CPT | Performed by: EMERGENCY MEDICINE

## 2024-01-15 PROCEDURE — 83605 ASSAY OF LACTIC ACID: CPT | Performed by: STUDENT IN AN ORGANIZED HEALTH CARE EDUCATION/TRAINING PROGRAM

## 2024-01-15 PROCEDURE — 99223 1ST HOSP IP/OBS HIGH 75: CPT | Performed by: STUDENT IN AN ORGANIZED HEALTH CARE EDUCATION/TRAINING PROGRAM

## 2024-01-15 PROCEDURE — 80048 BASIC METABOLIC PNL TOTAL CA: CPT | Performed by: STUDENT IN AN ORGANIZED HEALTH CARE EDUCATION/TRAINING PROGRAM

## 2024-01-15 PROCEDURE — 96361 HYDRATE IV INFUSION ADD-ON: CPT

## 2024-01-15 RX ORDER — IOPAMIDOL 755 MG/ML
75 INJECTION, SOLUTION INTRAVASCULAR ONCE
Status: COMPLETED | OUTPATIENT
Start: 2024-01-15 | End: 2024-01-15

## 2024-01-15 RX ORDER — THYROID 60 MG/1
180 TABLET ORAL AT BEDTIME
Status: DISCONTINUED | OUTPATIENT
Start: 2024-01-15 | End: 2024-01-19 | Stop reason: HOSPADM

## 2024-01-15 RX ORDER — WARFARIN SODIUM 3 MG/1
3 TABLET ORAL
Status: COMPLETED | OUTPATIENT
Start: 2024-01-15 | End: 2024-01-15

## 2024-01-15 RX ORDER — ONDANSETRON 2 MG/ML
4 INJECTION INTRAMUSCULAR; INTRAVENOUS EVERY 6 HOURS PRN
Status: DISCONTINUED | OUTPATIENT
Start: 2024-01-15 | End: 2024-01-19 | Stop reason: HOSPADM

## 2024-01-15 RX ORDER — ACETAMINOPHEN 325 MG/1
975 TABLET ORAL ONCE
Status: COMPLETED | OUTPATIENT
Start: 2024-01-15 | End: 2024-01-15

## 2024-01-15 RX ORDER — CEFAZOLIN SODIUM 1 G/50ML
2500 SOLUTION INTRAVENOUS ONCE
Status: COMPLETED | OUTPATIENT
Start: 2024-01-15 | End: 2024-01-15

## 2024-01-15 RX ORDER — ALBUMIN (HUMAN) 12.5 G/50ML
50 SOLUTION INTRAVENOUS ONCE
Status: COMPLETED | OUTPATIENT
Start: 2024-01-15 | End: 2024-01-15

## 2024-01-15 RX ORDER — PANTOPRAZOLE SODIUM 40 MG/1
40 TABLET, DELAYED RELEASE ORAL DAILY PRN
Status: DISCONTINUED | OUTPATIENT
Start: 2024-01-15 | End: 2024-01-16

## 2024-01-15 RX ORDER — ACETAZOLAMIDE 250 MG/1
500 TABLET ORAL AT BEDTIME
Status: DISCONTINUED | OUTPATIENT
Start: 2024-01-15 | End: 2024-01-19 | Stop reason: HOSPADM

## 2024-01-15 RX ORDER — IBUPROFEN 200 MG
200-400 TABLET ORAL EVERY 6 HOURS PRN
COMMUNITY

## 2024-01-15 RX ORDER — AMOXICILLIN 250 MG
1 CAPSULE ORAL 2 TIMES DAILY PRN
Status: DISCONTINUED | OUTPATIENT
Start: 2024-01-15 | End: 2024-01-19 | Stop reason: HOSPADM

## 2024-01-15 RX ORDER — PIPERACILLIN SODIUM, TAZOBACTAM SODIUM 3; .375 G/15ML; G/15ML
3.38 INJECTION, POWDER, LYOPHILIZED, FOR SOLUTION INTRAVENOUS EVERY 8 HOURS
Status: DISCONTINUED | OUTPATIENT
Start: 2024-01-15 | End: 2024-01-18

## 2024-01-15 RX ORDER — PIPERACILLIN SODIUM, TAZOBACTAM SODIUM 3; .375 G/15ML; G/15ML
3.38 INJECTION, POWDER, LYOPHILIZED, FOR SOLUTION INTRAVENOUS ONCE
Status: COMPLETED | OUTPATIENT
Start: 2024-01-15 | End: 2024-01-15

## 2024-01-15 RX ORDER — LIDOCAINE 40 MG/G
CREAM TOPICAL
Status: DISCONTINUED | OUTPATIENT
Start: 2024-01-15 | End: 2024-01-19 | Stop reason: HOSPADM

## 2024-01-15 RX ORDER — VANCOMYCIN HYDROCHLORIDE 1 G/200ML
1000 INJECTION, SOLUTION INTRAVENOUS EVERY 24 HOURS
Status: DISCONTINUED | OUTPATIENT
Start: 2024-01-16 | End: 2024-01-17

## 2024-01-15 RX ORDER — AMOXICILLIN 250 MG
2 CAPSULE ORAL 2 TIMES DAILY PRN
Status: DISCONTINUED | OUTPATIENT
Start: 2024-01-15 | End: 2024-01-19 | Stop reason: HOSPADM

## 2024-01-15 RX ORDER — HEPARIN SODIUM 5000 [USP'U]/.5ML
5000 INJECTION, SOLUTION INTRAVENOUS; SUBCUTANEOUS EVERY 8 HOURS
Status: DISCONTINUED | OUTPATIENT
Start: 2024-01-15 | End: 2024-01-15

## 2024-01-15 RX ORDER — ACETAMINOPHEN 325 MG/1
325 TABLET ORAL EVERY 8 HOURS PRN
Status: DISCONTINUED | OUTPATIENT
Start: 2024-01-15 | End: 2024-01-19 | Stop reason: HOSPADM

## 2024-01-15 RX ORDER — CALCIUM CARBONATE 500 MG/1
1000 TABLET, CHEWABLE ORAL 4 TIMES DAILY PRN
Status: DISCONTINUED | OUTPATIENT
Start: 2024-01-15 | End: 2024-01-19 | Stop reason: HOSPADM

## 2024-01-15 RX ORDER — WARFARIN SODIUM 2 MG/1
3 TABLET ORAL EVERY EVENING
COMMUNITY
Start: 2023-12-31 | End: 2024-01-15

## 2024-01-15 RX ORDER — ACETAZOLAMIDE 250 MG/1
250 TABLET ORAL DAILY PRN
Status: DISCONTINUED | OUTPATIENT
Start: 2024-01-15 | End: 2024-01-19 | Stop reason: HOSPADM

## 2024-01-15 RX ORDER — SODIUM CHLORIDE 9 MG/ML
INJECTION, SOLUTION INTRAVENOUS CONTINUOUS
Status: ACTIVE | OUTPATIENT
Start: 2024-01-15 | End: 2024-01-17

## 2024-01-15 RX ORDER — ONDANSETRON 4 MG/1
4 TABLET, ORALLY DISINTEGRATING ORAL EVERY 6 HOURS PRN
Status: DISCONTINUED | OUTPATIENT
Start: 2024-01-15 | End: 2024-01-19 | Stop reason: HOSPADM

## 2024-01-15 RX ADMIN — PIPERACILLIN AND TAZOBACTAM 3.38 G: 3; .375 INJECTION, POWDER, FOR SOLUTION INTRAVENOUS at 11:33

## 2024-01-15 RX ADMIN — WARFARIN SODIUM 3 MG: 3 TABLET ORAL at 20:34

## 2024-01-15 RX ADMIN — ACETAMINOPHEN 975 MG: 325 TABLET ORAL at 10:56

## 2024-01-15 RX ADMIN — ALBUMIN HUMAN 50 G: 0.25 SOLUTION INTRAVENOUS at 16:41

## 2024-01-15 RX ADMIN — SODIUM CHLORIDE: 9 INJECTION, SOLUTION INTRAVENOUS at 15:49

## 2024-01-15 RX ADMIN — THYROID, PORCINE 180 MG: 60 TABLET ORAL at 20:32

## 2024-01-15 RX ADMIN — VANCOMYCIN HYDROCHLORIDE 2500 MG: 1 INJECTION, POWDER, LYOPHILIZED, FOR SOLUTION INTRAVENOUS at 12:49

## 2024-01-15 RX ADMIN — ACETAZOLAMIDE 500 MG: 250 TABLET ORAL at 20:33

## 2024-01-15 RX ADMIN — IOPAMIDOL 75 ML: 755 INJECTION, SOLUTION INTRAVENOUS at 13:51

## 2024-01-15 RX ADMIN — PIPERACILLIN AND TAZOBACTAM 3.38 G: 3; .375 INJECTION, POWDER, FOR SOLUTION INTRAVENOUS at 16:50

## 2024-01-15 RX ADMIN — SODIUM CHLORIDE 3402 ML: 9 INJECTION, SOLUTION INTRAVENOUS at 10:47

## 2024-01-15 ASSESSMENT — ACTIVITIES OF DAILY LIVING (ADL)
ADLS_ACUITY_SCORE: 35
ADLS_ACUITY_SCORE: 37
ADLS_ACUITY_SCORE: 35
ADLS_ACUITY_SCORE: 37
ADLS_ACUITY_SCORE: 35
ADLS_ACUITY_SCORE: 37
ADLS_ACUITY_SCORE: 35
DEPENDENT_IADLS:: INDEPENDENT

## 2024-01-15 NOTE — PHARMACY-VANCOMYCIN DOSING SERVICE
Pharmacy Vancomycin Initial Note  Date of Service January 15, 2024  Patient's  1970  53 year old, female    Indication: Sepsis    Current estimated CrCl = Estimated Creatinine Clearance: 58.4 mL/min (A) (based on SCr of 1.39 mg/dL (H)).    Creatinine for last 3 days  1/15/2024: 10:34 AM Creatinine 1.39 mg/dL    Recent Vancomycin Level(s) for last 3 days  No results found for requested labs within last 3 days.      Vancomycin IV Administrations (past 72 hours)        No vancomycin orders with administrations in past 72 hours.                    Nephrotoxins and other renal medications (From now, onward)      Start     Dose/Rate Route Frequency Ordered Stop    01/15/24 1100  piperacillin-tazobactam (ZOSYN) 3.375 g vial to attach to  mL bag         3.375 g  over 30 Minutes Intravenous ONCE 01/15/24 1046              Contrast Orders - past 72 hours (72h ago, onward)      None                  Plan:  Start vancomycin  2500 mg IV Once (22mg/kg).   Please reconsult pharmacy for continued vancomycin dosing if needed.      Isaac Young RPH

## 2024-01-15 NOTE — ED NOTES
"[facility] ED Handoff Report    ED Chief Complaint: sob and fever    ED Diagnosis:  (A41.9,  R65.21) Septic shock (H)  Comment:   Plan:     (R79.89) Elevated LFTs  Comment:   Plan:     (R00.0) Sinus tachycardia  Comment:   Plan:        PMH:    Past Medical History:   Diagnosis Date    Deep vein thrombosis (H)     Factor V deficiency (H)     Gastroesophageal reflux disease     Hypothyroidism     Pulmonary embolism (H)     Sleep apnea         Code Status:  Full Code     Falls Risk: Yes Band: Applied    Current Living Situation/Residence: lives in a house     Elimination Status: Continent: Yes     Activity Level: SBA    Patients Preferred Language:  English     Needed: No    Vital Signs:  /51   Pulse 103   Temp 100.2  F (37.9  C) (Oral)   Resp 26   Ht 1.626 m (5' 4\")   Wt 113.4 kg (250 lb)   LMP 12/13/2023 (Exact Date)   SpO2 96%   BMI 42.91 kg/m       Cardiac Rhythm: S tachycardia    Pain Score: 0/10    Is the Patient Confused:  No    Last Food or Drink: 01/15/24 AM?    Focused Assessment:  pt had influenza A end of Dec, pt felt better, had tachycardia, pt at hospital recently and then had possible food poisoning, then had fevers and sob and came to ED via car with family    Tests Performed: Done: Labs and Imaging    Treatments Provided:  see charting    Family Dynamics/Concerns: No    Family Updated On Visitor Policy: Yes    Plan of Care Communicated to Family: Yes    Who Was Updated about Plan of Care: family at bedside    Belongings Checklist Done and Signed by Patient: No belongings remain at bedside    Medications sent with patient: albumin running 3 of 4, zosyn running and 100cc NS maitnence fluids    Covid: symptomatic, negative    Additional Information: call with questions, if you have any    RN: Meseret Lorenz RN   1/15/2024 5:50 PM       "

## 2024-01-15 NOTE — ED TRIAGE NOTES
"Pt reports hx of racing heart 2 wks ago when admitted for the flu. Pt reports chest discomfort \"feels like reflux,\" and racing heart today, hx of blood clots.     Triage Assessment (Adult)       Row Name 01/15/24 1034          Triage Assessment    Airway WDL WDL        Respiratory WDL    Respiratory WDL WDL        Skin Circulation/Temperature WDL    Skin Circulation/Temperature WDL WDL        Cardiac WDL    Cardiac WDL chest pain        Peripheral/Neurovascular WDL    Peripheral Neurovascular WDL WDL        Cognitive/Neuro/Behavioral WDL    Cognitive/Neuro/Behavioral WDL WDL                     "

## 2024-01-15 NOTE — CONSULTS
[facility] Consult       Name: Anna Nieves    Medical Record #: [id number]    YOB: 1970    Date/Time: 1/15/2024/3:00 PM    Reason for Consultation: Jaron Davis MD has asked me to evaluate Anna Nieves regarding abnormal LFTs.    HPI: Patient is a 53-year-old female who presents with fevers and palpitations.  She reports that she was feeling well until [place] ate out at [employer] 2 days prior to presentation.  She ordered a medium steak but she reports it was relatively rare.  After dinner she developed some nausea and 1 episode of vomiting and 1 episode of nonbloody diarrhea.  She also developed a fever to approximately 103.  She took Advil and her fever decreased to 99 or 100 but then recurred today.  She also had palpitations.  Reports she had flulike symptoms and palpitations a few weeks ago and was evaluated.  Her LFTs on 12/26/23 were normal.  Her LFTs today show elevation in her transaminases, alkaline phosphatase, as well as her bilirubin.  She reports that she has had intermittent right upper quadrant pain for a few months but has not had any abdominal pain recently.  She denies any abdominal pain or other GI symptoms at this time.  She had a fever to 102.9 in the ER.  She was tachycardic and mildly hypotensive but her vitals have improved.  She had a right upper quadrant ultrasound and a CT scan of the abdomen and pelvis in the ER that did not show any evidence of biliary ductal dilation and no evidence of choledocholithiasis or gallstones.    Patient Active Problem List   Diagnosis    Cerebral venous thrombosis in puerperium    Factor 5 Leiden mutation, heterozygous (H24)    Thromboembolic disorder (H)    History of chronic urticaria    Obesity    Obstructive sleep apnea syndrome    Injury of head, initial encounter    Closed fracture of orbital floor, unspecified laterality, initial encounter (H)    Tachycardia    Acquired hypothyroidism    Attention  deficit hyperactivity disorder (ADHD), predominantly inattentive type    Factor V deficiency (H)    Gastroesophageal reflux disease    Headache, unspecified    Hypomagnesemia    Major depressive disorder, recurrent, moderate (H)    Other allergic rhinitis    Personal history of pulmonary embolism    Pseudotumor cerebri    Urticaria    Vitamin D deficiency    Warfarin anticoagulation    Morbid (severe) obesity due to excess calories (H)          Review of Systems (ROS): A comprehensive review of systems was negative.    Past Medical History:  Past Medical History:   Diagnosis Date    Deep vein thrombosis (H)     Factor V deficiency (H)     Gastroesophageal reflux disease     Hypothyroidism     Pulmonary embolism (H)     Sleep apnea        Medications:   Current Outpatient Medications   Medication Sig Dispense Refill    acetaZOLAMIDE (DIAMOX) 250 MG tablet Take 500 mg by mouth at bedtime      acetaZOLAMIDE (DIAMOX) 250 MG tablet Take 250 mg by mouth daily as needed (morning dose if needed for head pressure)      albuterol (PROAIR HFA/PROVENTIL HFA/VENTOLIN HFA) 108 (90 Base) MCG/ACT inhaler Inhale 2 puffs into the lungs every 4 hours as needed for shortness of breath, wheezing or cough      amphetamine-dextroamphetamine (ADDERALL XR) 30 MG 24 hr capsule Take 30 mg by mouth daily Mon to Fri, usually none on weekends      cetirizine (ZYRTEC) 10 MG tablet Take 10 mg by mouth daily as needed for allergies or rhinitis      ibuprofen (ADVIL/MOTRIN) 200 MG tablet Take 200-400 mg by mouth every 6 hours as needed for pain, fever or inflammatory pain      LORazepam (ATIVAN) 0.5 MG tablet Take 1 tablet (0.5 mg) by mouth daily as needed for anxiety or withdrawal 6 tablet 0    omeprazole (PRILOSEC) 20 MG DR capsule Take 20 mg by mouth daily as needed (heartburn)      thyroid (ARMOUR) 180 MG tablet Take 180 mg by mouth at bedtime      vitamin D2 (ERGOCALCIFEROL) 72636 units (1250 mcg) capsule Take 1 capsule by mouth every 7 days  1  "   warfarin (COUMADIN) 3 MG tablet Take 3-4 mg by mouth at bedtime Take 3 mg on Monday, Wednesday, Friday, Saturday and Sunday. Take 4 mg on Tuesday and Thursday.         Allergies: Cats, Dust mites, Mold, Pollen extract, Sulfa antibiotics, and Sulfamethoxazole-trimethoprim    Family History:  Family History   Problem Relation Age of Onset    Hypertension Father     Lymphoma Father     Peripheral Vascular Disease Father        Social History:  Social History     Socioeconomic History    Marital status:      Spouse name: Not on file    Number of children: Not on file    Years of education: Not on file    Highest education level: Not on file   Occupational History    Not on file   Tobacco Use    Smoking status: Never    Smokeless tobacco: Never   Vaping Use    Vaping Use: Never used   Substance and Sexual Activity    Alcohol use: Yes     Comment: 1-2 times per year    Drug use: Never    Sexual activity: Not on file   Other Topics Concern    Parent/sibling w/ CABG, MI or angioplasty before 65F 55M? Not Asked   Social History Narrative    Not on file     Social Determinants of Health     Financial Resource Strain: Not on file   Food Insecurity: Not on file   Transportation Needs: Not on file   Physical Activity: Not on file   Stress: Not on file   Social Connections: Not on file   Interpersonal Safety: Not on file   Housing Stability: Not on file       PHYSICAL EXAMINATION:  BP 96/46   Pulse 113   Temp (!) 102.9  F (39.4  C) (Oral)   Resp 24   Ht 1.626 m (5' 4\")   Wt 113.4 kg (250 lb)   LMP 12/13/2023 (Exact Date)   SpO2 97%   BMI 42.91 kg/m   Body mass index is 42.91 kg/m .  General: NAD  Gastrointestinal: Non-distended, Nontender   Musculoskeletal: Extremities are warm, no lower extremity edema.  Neuro: Alert and oriented.  No gross focal abnormalities.  Psych: Mood and affect normal.  Skin: No suspicious lesions or rashes.    I have reviewed recent laboratory studies:    LABORATORY DATA:    Recent Labs "   Lab Test 01/15/24  1034   WBC 11.6*   RBC 4.43   HGB 13.0   HCT 38.6   MCV 87   MCH 29.3   MCHC 33.7   RDW 13.5         Sodium   Date Value Ref Range Status   01/15/2024 133 (L) 135 - 145 mmol/L Final     Comment:     Reference intervals for this test were updated on 09/26/2023 to more accurately reflect our healthy population. There may be differences in the flagging of prior results with similar values performed with this method. Interpretation of those prior results can be made in the context of the updated reference intervals.      Potassium   Date Value Ref Range Status   01/15/2024 3.6 3.4 - 5.3 mmol/L Final   06/17/2022 4.5 3.5 - 5.0 mmol/L Final     Chloride   Date Value Ref Range Status   01/15/2024 100 98 - 107 mmol/L Final   06/17/2022 110 (H) 98 - 107 mmol/L Final     Carbon Dioxide (CO2)   Date Value Ref Range Status   01/15/2024 15 (L) 22 - 29 mmol/L Final   06/17/2022 20 (L) 22 - 31 mmol/L Final     Anion Gap   Date Value Ref Range Status   01/15/2024 18 (H) 7 - 15 mmol/L Final   06/17/2022 11 5 - 18 mmol/L Final     Glucose   Date Value Ref Range Status   01/15/2024 208 (H) 70 - 99 mg/dL Final   06/17/2022 91 70 - 125 mg/dL Final     Urea Nitrogen   Date Value Ref Range Status   01/15/2024 22.9 (H) 6.0 - 20.0 mg/dL Final   06/17/2022 9 8 - 22 mg/dL Final     Creatinine   Date Value Ref Range Status   01/15/2024 1.39 (H) 0.51 - 0.95 mg/dL Final     GFR Estimate   Date Value Ref Range Status   01/15/2024 45 (L) >60 mL/min/1.73m2 Final   02/13/2021 >60 >60 mL/min/1.73m2 Final     Calcium   Date Value Ref Range Status   01/15/2024 8.2 (L) 8.6 - 10.0 mg/dL Final      Recent Labs   Lab Test 01/15/24  1034   PROTTOTAL 6.3*   ALBUMIN 3.0*   BILITOTAL 3.2*   ALKPHOS 178*   *   *      INR   Date Value Ref Range Status   01/15/2024 2.63 (H) 0.85 - 1.15 Final   09/01/2005 2.37 (H) 0.86 - 1.14 Final         Radiology: reviewed.    Impression: This is a 53-year-old female who presents with  fevers and palpitations.  She reports that she was feeling well until she ate out at [facility] 2 days prior to presentation.  She ordered a medium steak but she reports it was relatively rare.  After dinner she developed some nausea and 1 episode of vomiting and 1 episode of nonbloody diarrhea.  She also developed a fever to approximately 103.  She took Advil and her fever decreased to 99 or 100 but then recurred today.  She also had palpitations.  Reports she had flulike symptoms and palpitations a few weeks ago and was evaluated.  Her LFTs on 12/26/23 were normal.  Her LFTs today show elevation in her transaminases, alkaline phosphatase, as well as her bilirubin.  She reports that she has had intermittent right upper quadrant pain for a few months but has not had any abdominal pain recently.  She denies any abdominal pain or other GI symptoms at this time.  She had a fever to 102.9 in the ER.  She was tachycardic and mildly hypotensive but her vitals have improved.  She had a right upper quadrant ultrasound and a CT scan of the abdomen and pelvis in the ER that did not show any evidence of biliary ductal dilation and no evidence of choledocholithiasis or gallstones.  Possible etiologies of patient's current presentation include an infectious process such as a viral illness that is also leading to LFT abnormalities.  Choledocholithiasis seems less likely at this time given her lack of abdominal pain currently and the absence of gallstones or any biliary ductal dilation on her abdominal imaging.    Recommendation:   -MRCP as soon as possible for further evaluation  -Check acute viral hepatitis panel  -IV fluid  -Continue close monitoring and supportive care  -Continue IV antibiotics for now  -If patient develops significant upper abdominal pain and/or if the MRCP shows evidence of choledocholithiasis and if she becomes unstable today then we will proceed with an urgent ERCP  -Case discussed with advanced  endoscopist    Rita Sandra MD  1/15/2024  [facility]

## 2024-01-15 NOTE — PHARMACY-ANTICOAGULATION SERVICE
Clinical Pharmacy - Warfarin Dosing Consult     Pharmacy has been consulted to manage this patient s warfarin therapy.  Indication: DVT/PE Prophylaxis  Therapy Goal: INR 2-3  Warfarin Prior to Admission: Yes  Warfarin PTA Regimen: Take 3 mg on Monday, Wednesday, Friday, Saturday and Sunday. Take 4 mg on Tuesday and Thursday  Significant drug interactions: [place] thyroid, omeprazole    INR   Date Value Ref Range Status   01/15/2024 2.68 (H) 0.85 - 1.15 Final   01/15/2024 2.63 (H) 0.85 - 1.15 Final     Factor 2 Assay   Date Value Ref Range Status   09/01/2005 19 (L) 60 - 140 % Final       Recommend warfarin 3 mg today.  Pharmacy will monitor Anna Nieves daily and order warfarin doses to achieve specified goal.      Please contact pharmacy as soon as possible if the warfarin needs to be held for a procedure or if the warfarin goals change.

## 2024-01-15 NOTE — ED PROVIDER NOTES
EMERGENCY DEPARTMENT ENCOUNTER      NAME: Anna Nieves  AGE: 53 year old female  YOB: 1970  MRN: [id number]  EVALUATION DATE & TIME: No admission date for patient encounter.    PCP: Itzel Young    ED PROVIDER: Jaron Davis M.D.      Chief Complaint   Patient presents with    Palpitations         FINAL IMPRESSION:  1. Septic shock (H)    2. Elevated LFTs    3. Sinus tachycardia          ED COURSE & MEDICAL DECISION MAKING:    Pertinent Labs & Imaging studies reviewed below.  All EKGs below represent my independent interpretation.   ED Course as of 01/15/24 1531   Mon Edward 15, 2024   1100 Patient is a 53-year-old woman who presents with fever, intermittent chest discomfort, nausea vomiting.  On arrival she has a blood pressure of 136/63, but was seen to be found febrile at 102.9, tachycardic to the 150s.  Normal respiratory rate and oxygenation.  She is uncomfortable appearing, has dry mucous membranes, is tachycardic.  Benign abdominal exam.  She was recently admitted last month for influenza A but had persistent tachycardia, was thought to be sympathomimetic over response, and had since resolved.  She also has a history of DVT, but it has been many years and she has been managed on warfarin.  Differential includes sepsis, hypothyroidism, alcohol withdrawal, pericarditis   1102 EKG shows sinus tachycardia with a rate of 161.  No acute ischemic ST or T wave morphology.  Normal axis, normal intervals.  When compared to prior EKG on December 26, 2023, there is no significant change.  Impression: Sinus tachycardia with a rate of 161.   1138 Lactic Acid(!!): 5.8  Code sepsis was called when I received the call from lab   1138 Creatinine(!): 1.39  PAIGE, 30 ml/kg has been ordered under septic shock guidelines   1139 Unable to obtain 2nd blood culture due to access. Abx will be started regardless   1159 Pulse(!): 134  Improving with fluids   1159 BP: 91/55  Continue to monitor, but significantly  lower than on arrival   1159 Bedside US did not show pericardial effusion, decreasing likelihood of pericarditis   1220 TSH: 3.16  WNL   1223 Elevated LFTs and bilirubin.  Ultrasound of the gallbladder was ordered.  Cholecystitis or choledocholithiasis in the differential.   1257 Chest XR,  PA & LAT   Minimal basilar opacities may be from atelectasis. Remaining lungs are clear. No pleural effusion or pneumothorax. Normal heart size.   1308 US Abdomen Limited  1.  No acute abnormalities.  2.  Hepatic steatosis.   1342 Lactic Acid(!): 3.2     Summary: Patient is a 53-year-old woman who presents with fever, tachycardia over the last few days in the context of having 1 day of nausea vomiting, diarrhea after eating suspicious steak few days ago.  Here in the emergency department she has a benign abdomen, no tenderness, her temperature is 102.9.  Her workup showed no leukocytosis, but lactic acid elevated at 5.8.  She was given 30 cc/kg normal saline, was given broad-spectrum antibiotics.  Her blood pressure did drop but never below MAP of 65.  Her hepatic enzymes are elevated, bili was elevated, but ultrasound of the gallbladder and CT scan of the abdomen pelvis did not show evidence of cholecystitis or dilated common bile duct.  Less likely to be choledocholithiasis.  I spoke to GI who recommends MRCP.  Admitted to the hospitalist. UA pending (she has been in the hallway during her workup).    Additional ED Course Timestamps:  10:31 AM Medical student met with the patient and gathered information, history, and performed initial exam.  10:50 AM I met with the patient, gathered information, history, and performed initial exam.  11:45 AM I rechecked on the patient and updated her.  11:48 AM I performed heart US at bedside.  1:01 PM I rechecked on the patient and updated her on results and care plan.     Medical Decision Making  Obtained supplemental history:Supplemental history obtained?: No  Reviewed external records:  External records reviewed?: Documented in chart  Care impacted by chronic illness:Other: factor V deficiency, GRD, hypothyroidism, pulmonary embolism  Care significantly affected by social determinants of health:N/A  Did you consider but not order tests?: Work up considered but not performed and documented in chart, if applicable  Did you interpret images independently?: Independent interpretation of ECG and images noted in documentation, when applicable.  Consultation discussion with other provider:Did you involve another provider (consultant, , pharmacy, etc.)?: I discussed the care with another health care provider, see documentation for details.  Admit.    At the conclusion of the encounter I discussed the results of all of the tests and the disposition. The questions were answered. The patient or family acknowledged understanding and was agreeable with the care plan.     60 minutes of critical care time for severe sepsis/septic shock.  Bs antibiotics ordered, 30 cc/kg Normal saline ordered, close hemodynamic monitoring.  This excludes time spent performing interventions or procedures.    MEDICATIONS GIVEN IN THE EMERGENCY:  Medications   lidocaine 1 % 0.1-1 mL (has no administration in time range)   lidocaine (LMX4) cream (has no administration in time range)   sodium chloride (PF) 0.9% PF flush 3 mL (has no administration in time range)   sodium chloride (PF) 0.9% PF flush 3 mL (has no administration in time range)   senna-docusate (SENOKOT-S/PERICOLACE) 8.6-50 MG per tablet 1 tablet (has no administration in time range)     Or   senna-docusate (SENOKOT-S/PERICOLACE) 8.6-50 MG per tablet 2 tablet (has no administration in time range)   calcium carbonate (TUMS) chewable tablet 1,000 mg (has no administration in time range)   sodium chloride 0.9 % infusion (has no administration in time range)   heparin ANTICOAGULANT injection 5,000 Units (has no administration in time range)   ondansetron (ZOFRAN ODT) ODT tab  4 mg (has no administration in time range)     Or   ondansetron (ZOFRAN) injection 4 mg (has no administration in time range)   Warfarin Dose Required Daily - Pharmacist Managed (has no administration in time range)   acetaminophen (TYLENOL) tablet 325 mg (has no administration in time range)   piperacillin-tazobactam (ZOSYN) 3.375 g vial to attach to  mL bag (has no administration in time range)   acetaZOLAMIDE (DIAMOX) tablet 500 mg (has no administration in time range)   acetaZOLAMIDE (DIAMOX) tablet 250 mg (has no administration in time range)   thyroid (ARMOUR) tablet 180 mg (has no administration in time range)   pantoprazole (PROTONIX) EC tablet 40 mg (has no administration in time range)   piperacillin-tazobactam (ZOSYN) 3.375 g vial to attach to  mL bag (0 g Intravenous Stopped 1/15/24 1200)   sodium chloride 0.9% BOLUS 3,402 mL (0 mLs Intravenous Stopped 1/15/24 1402)   acetaminophen (TYLENOL) tablet 975 mg (975 mg Oral $Given 1/15/24 1056)   vancomycin (VANCOCIN) 2,500 mg in sodium chloride 0.9 % 500 mL intermittent infusion (2,500 mg Intravenous $New Bag 1/15/24 1249)   iopamidol (ISOVUE-370) solution 75 mL (75 mLs Intravenous $Given 1/15/24 1351)         NEW PRESCRIPTIONS STARTED AT TODAY'S ER VISIT  New Prescriptions    No medications on file          =================================================================    HPI    Anna Nieves is a 53 year old female with a history of factor V deficiency, GRD, hypothyroidism, pulmonary embolism, RENETTA, DVT in left leg, bilateral arms, and lungs who presents to this ED for evaluation of high heart rate and fever.     Per chat review,  Patient had a recent admission here on 12/26/23-12/28/23 for evaluation of sinus tachycardia. Pulmonary CT angiogram and Lower extremity venous US were unremarkable. INR was in the therapeutic range. Nasal swab was poitive for influenza. Placed on oseltamivir. Given IV fluid. Given 1 dose of IV lorazepam. Sinus  "tachycardeia was resolved. Patient was discharged home in stable condition with plan to follow-up with primary doctor. Placed on oseltamavir for 3 days.     Patient has history of blood clot in left leg, bilateral arms, and lungs all at once about 10 years ago.     Patient reports fever (103 F) and high heart rate, which started on Saturday (2 days ago) and resolved yesterday, but came back today. Patient reports nausea and vomiting one time on Saturday, in which she thinks it was due to a food poisoning. She has taken Advil for her symptoms and states feeling better afterwards. She denies nausea now at the ED. She also reports diarrhea on Saturday secondary to previous symptoms, but now has resolved. She denies other changes in her stool or urine now at the ED. She also notes onset mild achy pain in bilateral extremities, which has started on Saturday. Patient also reports heavy/thick greenish purple nose drainage, which started on Saturday. Denies ear discharge. She notes mild cough.     Patient reports mild achy pain in her mid chest, which radiates to her left-sided  chest and reports that it feels more like her acid reflux pain. Patient reports developing shortness of breath today.     Patient denies abdominal pain, redness/swelling in lower extremities, history of anxiety, or any other symptoms.     Patient is on warfarin- 4 mg on Tuesdays and Thursdays, and 3 mg on the rest of the days.       VITALS:  /51   Pulse 117   Temp 100.2  F (37.9  C) (Oral)   Resp 19   Ht 1.626 m (5' 4\")   Wt 113.4 kg (250 lb)   LMP 12/13/2023 (Exact Date)   SpO2 96%   BMI 42.91 kg/m      PHYSICAL EXAM    Constitutional: Well developed, well nourished. Slightly uncomfortable appearing.  HENT: Atraumatic, mucous membranes moist, nose normal. Neck- Supple, gross ROM intact.   Eyes: Pupils mid-range, conjunctiva without injection, no discharge.   Respiratory: Clear to auscultation bilaterally, no respiratory distress, no " wheezing, speaks full sentences easily. No cough.  Cardiovascular: tachycardic heart rate, regular rhythm, no murmurs.   GI: Soft, no tenderness to deep palpation in all quadrants, no masses.  Musculoskeletal: Moving all 4 extremities intentionally and without pain. No obvious deformity.  Skin: Warm, dry, no rash.  Neurologic: Alert & oriented x 3, cranial nerves grossly intact.  Psychiatric: Affect normal, cooperative.    POC US ECHO LIMITED    Date/Time: 1/15/2024 6:21 PM    Performed by: Jaron Davis MD  Authorized by: Jaron Davis MD    Comments:      Unable to obtain adequate parasternal views, however subcostal view shows that there is no pericardial effusion.        I, Yaquelin Nuñez  am serving as a scribe to document services personally performed by Dr. Jaron Davis based on my observation and the provider's statements to me. I, Jaron Davis MD attest that Yaquelin Nuñez  is acting in a scribe capacity, has observed my performance of the services and has documented them in accordance with my direction.    Jaron Davis M.D.  Emergency Medicine  [facility] EMERGENCY DEPARTMENT  [facility] [facility] [id number] [phone]  Dept: [phone]     Jaron Davis MD  01/15/24 1640       Jaron Davis MD  01/15/24 1931

## 2024-01-15 NOTE — CONSULTS
Care Management Initial Consult    General Information  Assessment completed with: Patient,    Type of CM/SW Visit: Initial Assessment    Primary Care Provider verified and updated as needed: Yes   Readmission within the last 30 days:        Reason for Consult: discharge planning  Advance Care Planning:            Communication Assessment  Patient's communication style: spoken language (English or Bilingual)             Cognitive  Cognitive/Neuro/Behavioral: WDL                      Living Environment:   People in home: spouse, child(carissa), dependent     Current living Arrangements: house      Able to return to prior arrangements: yes       Family/Social Support:  Care provided by: self  Provides care for: child(carissa)  Marital Status:             Description of Support System: Supportive, Involved    Support Assessment: Adequate family and caregiver support    Current Resources:   Patient receiving home care services: No     Community Resources: None  Equipment currently used at home:    Supplies currently used at home: None    Employment/Financial:  Employment Status: employed full-time        Financial Concerns: none           Does the patient's insurance plan have a 3 day qualifying hospital stay waiver?  No    Lifestyle & Psychosocial Needs:  Social Determinants of Health     Food Insecurity: Not on file   Depression: Not at risk (7/18/2023)    PHQ-2     PHQ-2 Score: 0   Housing Stability: Not on file   Tobacco Use: Low Risk  (10/2/2023)    Patient History     Smoking Tobacco Use: Never     Smokeless Tobacco Use: Never     Passive Exposure: Not on file   Financial Resource Strain: Not on file   Alcohol Use: Not on file   Transportation Needs: Not on file   Physical Activity: Not on file   Interpersonal Safety: Not on file   Stress: Not on file   Social Connections: Not on file       Functional Status:  Prior to admission patient needed assistance:   Dependent ADLs:: Independent  Dependent IADLs::  Independent       Mental Health Status:  Mental Health Status: No Current Concerns       Chemical Dependency Status:  Chemical Dependency Status: No Current Concerns             Values/Beliefs:  Spiritual, Cultural Beliefs, [employer]ian Practices, Values that affect care: no               Additional Information:  Patient lives in a house with spouse and two minor children. She is independent at baseline, works full time, and drives.     Recently admitted 12/16/24-12/28/24.     Unknown discharge needs. CM will continue to monitor progression of care, review team recommendations and provide discharge planning assist as needed.     ABRAHAN SomersW

## 2024-01-15 NOTE — PHARMACY-ADMISSION MEDICATION HISTORY
Pharmacist Admission Medication History    Admission medication history is complete. The information provided in this note is only as accurate as the sources available at the time of the update.    Information Source(s): Patient and CareEverywhere/SureScripts via in-person    Pertinent Information: none    Changes made to PTA medication list:  Added: Advil  Deleted: Tamiflu  Changed: Thyroid       Allergies reviewed with patient and updates made in EHR: yes    Medication History Completed By: Jany Cole [facility] 1/15/2024 1:09 PM    PTA Med List   Medication Sig Last Dose    acetaZOLAMIDE (DIAMOX) 250 MG tablet Take 500 mg by mouth at bedtime 1/14/2024 at pm    acetaZOLAMIDE (DIAMOX) 250 MG tablet Take 250 mg by mouth daily as needed (morning dose if needed for head pressure) Unknown at prn    albuterol (PROAIR HFA/PROVENTIL HFA/VENTOLIN HFA) 108 (90 Base) MCG/ACT inhaler Inhale 2 puffs into the lungs every 4 hours as needed for shortness of breath, wheezing or cough Unknown at prn    amphetamine-dextroamphetamine (ADDERALL XR) 30 MG 24 hr capsule Take 30 mg by mouth daily Mon to Fri, usually none on weekends Past Week at am    cetirizine (ZYRTEC) 10 MG tablet Take 10 mg by mouth daily as needed for allergies or rhinitis Unknown at prn    ibuprofen (ADVIL/MOTRIN) 200 MG tablet Take 200-400 mg by mouth every 6 hours as needed for pain, fever or inflammatory pain Unknown at prn    LORazepam (ATIVAN) 0.5 MG tablet Take 1 tablet (0.5 mg) by mouth daily as needed for anxiety or withdrawal Past Week at prn    omeprazole (PRILOSEC) 20 MG DR capsule Take 20 mg by mouth daily as needed (heartburn) Unknown at prn    thyroid (ARMOUR) 180 MG tablet Take 180 mg by mouth at bedtime 1/14/2024 at pm    vitamin D2 (ERGOCALCIFEROL) 29148 units (1250 mcg) capsule Take 1 capsule by mouth every 7 days More than a month at ran out couple wks ago    warfarin (COUMADIN) 3 MG tablet Take 3-4 mg by mouth at bedtime Take 3 mg on Monday,  Wednesday, Friday, Saturday and Sunday. Take 4 mg on Tuesday and Thursday. 1/14/2024

## 2024-01-15 NOTE — ED NOTES
PICC paged , needs 2nd IV and cultures, attempts by multiple staff with IV US unable to get 2nd IV. Notified MD.

## 2024-01-15 NOTE — H&P
[facility]    History and Physical - Hospitalist Service       Date of Admission:  1/15/2024    Assessment & Plan    Assessment:  Anna Nieves is a 53 year old female admitted on 1/15/2024. She presented to the ER with complaint of fever and tachycardia, as per patient she was admitted to the hospital 2 weeks ago when she was feeling achy and tired and as patient has a history of clots she came to the hospital to get evaluated and found out she had influenza a, got treated also was told that her heart rate was up which she was treated for and subsequently discharged and was feeling better until Saturday when she ate some undercooked steak and started having nausea vomiting and diarrhea and fever over 103, she stated that her heart rate was slightly up but not significantly as she has been checking her heart rate since the previous discharge, she stated that she started feeling better but today again had a fever of 103 and her heart rate was in the 150s so she decided to come and get evaluated in the ER, she also mentioned that she has been having some epigastric discomfort which she attributes to drinking orange juice and also stated that she has had some right upper quadrant abdominal discomfort which she has been evaluated outpatient, in the ER vitals were significant for a fever of 103, heart rate was initially in the 160s along with tachypnea and patient was also seen to be hypotensive subsequently, the vital signs were improved subsequently with improvement in heart rate and blood pressure respiratory rate and temperature, labs were significant for mild hyponatremia, PAIGE with a creatinine of 1.39 with a baseline of below 1, high anion gap metabolic acidosis most likely in setting of lactic acidosis secondary to PAIGE, transaminitis and hyperbilirubinemia, hyperglycemia most likely reactive, mild leukocytosis and INR 2.63 most likely in the setting of warfarin use, blood cultures  were drawn, viral panel was negative for COVID, flu, respiratory syncytial virus, CT scan of the abdomen was performed which showed bladder wall thickening and hepatic steatosis, ultrasound of the abdomen also showed hepatic steatosis, patient received fluid boluses, Vanco and Zosyn in the ED along with Tylenol, ED provider discussed with GI who recommended MRCP, patient is going to be admitted to the hospital for severe sepsis with septic shock of unclear etiology possibly in the setting of UTI versus biliary disease.    Problem list and plan:  Severe sepsis associated with fever, tachycardia, tachypnea, hypotension, PAIGE and lactic acidosis of unclear etiology possibly UTI versus biliary disease  Mild leukocytosis most likely in setting of above  Presented with fever and tachycardia and was found to have high-grade fever, tachycardia, tachypnea, hypotension which has since improved after fluid resuscitation and Tylenol  Stated that she ate undercooked steak on Saturday and developed nausea vomiting, diarrhea and high-grade fever which improved but then again she became tachycardic and had a high-grade fever today 1/15/2024 which prompted her to come to the hospital  Blood cultures ordered currently pending  UA with reflex to culture has been ordered  Imaging evidence of bladder wall thickening  X-ray does not show any acute process  Viral panel negative for COVID, flu, respiratory syncytial virus  Monitor fever and WBC curve  MRCP has been ordered as per GI recommendations, continue to follow GI recommendations  Received Vanco and Zosyn in the ED along with fluids and Tylenol  Will continue with Vanco and Zosyn along with fluid resuscitation and low-dose Tylenol for pain and fever  GI recommended acute viral hepatitis panel which has been ordered  As per GI if patient develops significant upper abdominal pain and or if the MRCP shows evidence of choledocholithiasis and if she becomes unstable today then GI will  proceed with an urgent ERCP.  Ordered enteric bacteria and virus panel if patient develops diarrhea may send stool sample  Continue with cardiac med telemetry monitoring for now  Monitor orthostatic vital signs    Mild hyponatremia most likely in setting of dehydration and poor oral intake  Continue with gentle IV hydration  Monitor sodium levels    PAIGE most likely prerenal  High anion gap metabolic acidosis most likely in setting of lactic acidosis and PAIGE  Lactic acidosis most likely in setting of PAIGE resolving  Hypoalbuminemia repleted  Baseline creatinine below 1  Current creatinine 1.39  Continue gentle IV hydration  Monitor renal function  Monitor for worsening acidosis  Lactic acidosis resolving, continue to monitor until normalized  Albumin repleted    Transaminitis and hyperbilirubinemia of unclear etiology possibly shock liver and biliary sludging in the setting of above  Hepatic steatosis on imaging  Suspecting transaminitis and hyperbilirubinemia in the setting of shock liver from hypotension in the setting of sepsis and biliary sludging  CT and ultrasound abdomen shows hepatic steatosis  GI consulted and recommended MRCP, follow-up GI for further recommendations  Patient has been having some right upper quadrant abdominal discomfort and was being evaluated outpatient    Hyperglycemia most likely reactive  Morbid obesity  Advise diet and lifestyle modification at bedside including exercise  HbA1c 5.6    Epigastric discomfort most likely in setting of GERD  Patient stated that she was having some epigastric discomfort but attributed it to drinking a lot of orange juice  Troponin first set 10, second set ordered  Continue with Protonix  Could not appreciate any acute ischemic changes on EKG    History of venous thromboembolism  INR currently appears to be in the therapeutic range  Warfarin pharmacy to dose has been ordered    History of sleep apnea  Continue with CPAP    History of hypothyroidism  Continue  "with thyroid meds    Physical deconditioning/weakness  Fall precautions  PT and OT evaluation    Miscellaneous  Holding miscellaneous home meds like Adderall and lorazepam, may restart on discharge    DVT PPx  Intermittent pneumatic compression  Warfarin     CODE STATUS  Full code as per discussion with the patient        Diet: Combination Diet Regular Diet Adult    DVT Prophylaxis: Heparin SQ and Pneumatic Compression Devices  Martin Catheter: Not present  Lines: None     Cardiac Monitoring: ACTIVE order. Indication: Tachyarrhythmias, acute (48 hours)  Code Status: Full Code    Mental status: Patient was alert awake and oriented x 3, patient was a good historian most of the history was obtained from the patient, some history was obtained from chart review and the rest of the history was obtained from discussion with the ER physician  Clinically Significant Risk Factors Present on Admission              # Hypoalbuminemia: Lowest albumin = 3 g/dL at 1/15/2024 10:34 AM, will monitor as appropriate  # Drug Induced Coagulation Defect: home medication list includes an anticoagulant medication   # Acute Kidney Injury, unspecified: based on a >150% or 0.3 mg/dL increase in last creatinine compared to past 90 day average, will monitor renal function       # Severe Obesity: Estimated body mass index is 42.91 kg/m  as calculated from the following:    Height as of this encounter: 1.626 m (5' 4\").    Weight as of this encounter: 113.4 kg (250 lb).       # Financial/Environmental Concerns: none         Disposition Plan      Expected Discharge Date: 01/17/2024                  Saad J. Gondal, MD  Hospitalist Service  [facility]  Securely message with [employer] (more info)  Text page via [employer] Paging/Directory     ______________________________________________________________________    Chief Complaint   Fever and tachycardia    History is obtained from the patient    History of Present Illness   Anna DONOHUE" Ezequiel is a 53 year old female with a past medical history documented below presented to the ER with complaint of fever and tachycardia, as per patient she was admitted to the hospital 2 weeks ago when she was feeling achy and tired and as patient has a history of clots she came to the hospital to get evaluated and found out she had influenza a, got treated also was told that her heart rate was up which she was treated for and subsequently discharged and was feeling better until Saturday when she ate some undercooked steak and started having nausea vomiting and diarrhea and fever over 103, she stated that her heart rate was slightly up but not significantly as she has been checking her heart rate since the previous discharge, she stated that she started feeling better but today again had a fever of 103 and her heart rate was in the 150s so she decided to come and get evaluated in the ER, she also mentioned that she has been having some epigastric discomfort which she attributes to drinking orange juice and also stated that she has had some right upper quadrant abdominal discomfort which she has been evaluated outpatient, in the ER vitals were significant for a fever of 103, heart rate was initially in the 160s along with tachypnea and patient was also seen to be hypotensive subsequently, the vital signs were improved subsequently with improvement in heart rate and blood pressure respiratory rate and temperature, labs were significant for mild hyponatremia, PAIGE with a creatinine of 1.39 with a baseline of below 1, high anion gap metabolic acidosis most likely in setting of lactic acidosis secondary to PAIGE, transaminitis and hyperbilirubinemia, hyperglycemia most likely reactive, mild leukocytosis and INR 2.63 most likely in the setting of warfarin use, blood cultures were drawn, viral panel was negative for COVID, flu, respiratory syncytial virus, CT scan of the abdomen was performed which showed bladder wall  thickening and hepatic steatosis, ultrasound of the abdomen also showed hepatic steatosis, patient received fluid boluses, Vanco and Zosyn in the ED along with Tylenol, ED provider discussed with GI who recommended MRCP, patient is going to be admitted to the hospital for severe sepsis with septic shock of unclear etiology possibly in the setting of UTI versus biliary disease.      Past Medical History    Past Medical History:   Diagnosis Date    Deep vein thrombosis (H)     Factor V deficiency (H)     Gastroesophageal reflux disease     Hypothyroidism     Pulmonary embolism (H)     Sleep apnea        Past Surgical History   Past Surgical History:   Procedure Laterality Date    OPEN REDUCTION INTERNAL FIXATION ORBIT BLOWOUT Left 7/7/2023    Procedure: left orbital floor and medial wall fracture repair with implant;  Surgeon: Fina Sheppard MD;  Location:  OR    OPTICAL TRACKING SYSTEM ORBITOTOMY Left 7/7/2023    Procedure: LEFT ORBITOTOMY, USING OPTICAL TRACKING SYSTEM;  Surgeon: Fina Sheppard MD;  Location:  OR    wisdom teeth removal         Prior to Admission Medications   Prior to Admission Medications   Prescriptions Last Dose Informant Patient Reported? Taking?   LORazepam (ATIVAN) 0.5 MG tablet Past Week at prn  No Yes   Sig: Take 1 tablet (0.5 mg) by mouth daily as needed for anxiety or withdrawal   acetaZOLAMIDE (DIAMOX) 250 MG tablet 1/14/2024 at pm Self Yes Yes   Sig: Take 500 mg by mouth at bedtime   acetaZOLAMIDE (DIAMOX) 250 MG tablet Unknown at prn Self Yes Yes   Sig: Take 250 mg by mouth daily as needed (morning dose if needed for head pressure)   albuterol (PROAIR HFA/PROVENTIL HFA/VENTOLIN HFA) 108 (90 Base) MCG/ACT inhaler Unknown at prn Self Yes Yes   Sig: Inhale 2 puffs into the lungs every 4 hours as needed for shortness of breath, wheezing or cough   amphetamine-dextroamphetamine (ADDERALL XR) 30 MG 24 hr capsule Past Week at am Self Yes Yes   Sig: Take 30 mg by mouth daily Mon to  Fri, usually none on weekends   cetirizine (ZYRTEC) 10 MG tablet Unknown at prn Self Yes Yes   Sig: Take 10 mg by mouth daily as needed for allergies or rhinitis   ibuprofen (ADVIL/MOTRIN) 200 MG tablet Unknown at prn  Yes Yes   Sig: Take 200-400 mg by mouth every 6 hours as needed for pain, fever or inflammatory pain   omeprazole (PRILOSEC) 20 MG DR capsule Unknown at prn Self Yes Yes   Sig: Take 20 mg by mouth daily as needed (heartburn)   thyroid (ARMOUR) 180 MG tablet 1/14/2024 at pm  Yes Yes   Sig: Take 180 mg by mouth at bedtime   vitamin D2 (ERGOCALCIFEROL) 71330 units (1250 mcg) capsule More than a month at ran out couple wks ago Self Yes Yes   Sig: Take 1 capsule by mouth every 7 days   warfarin (COUMADIN) 3 MG tablet 1/14/2024 at 3 mg Self Yes Yes   Sig: Take 3-4 mg by mouth at bedtime Take 3 mg on Monday, Wednesday, Friday, Saturday and Sunday. Take 4 mg on Tuesday and Thursday.      Facility-Administered Medications: None        Review of Systems    The 10 point Review of Systems is negative other than noted in the HPI or here.  Fever and tachycardia    Physical Exam   Vital Signs: Temp: 100.2  F (37.9  C) Temp src: Oral BP: 104/51 Pulse: 117   Resp: 19 SpO2: 96 % O2 Device: None (Room air)    Weight: 250 lbs 0 oz    GENERAL: Patient was seen and examined at bedside with no acute distress, morbidly obese  HENT:  Head is normocephalic, atraumatic.   EYES:  Eyes have anicteric sclerae without conjunctival injection   LUNGS: Bilateral air entry, clear to auscultation bilaterally  CARDIOVASCULAR:  Regular rate and rhythm with no murmurs, gallops or rubs.  ABDOMEN:  Normal bowel sounds. Soft; nontender   EXT: no edema    SKIN:  No acute rashes.   NEUROLOGIC:  Grossly nonfocal.      Medical Decision Making       80 MINUTES SPENT BY ME on the date of service doing chart review, history, exam, documentation & further activities per the note.      Data     I have personally reviewed the following data over the  past 24 hrs:    11.6 (H)  \   13.0   / 206     133 (L) 100 22.9 (H) /  208 (H)   3.6 15 (L) 1.39 (H) \     ALT: 226 (H) AST: 234 (H) AP: 178 (H) TBILI: 3.2 (H)   ALB: 3.0 (L) TOT PROTEIN: 6.3 (L) LIPASE: 17     Trop: 10 BNP: N/A     TSH: 3.16 T4: N/A A1C: 5.6     Procal: N/A CRP: N/A Lactic Acid: 2.2 (H)       INR:  2.63 (H) PTT:  N/A   D-dimer:  N/A Fibrinogen:  N/A       Imaging results reviewed over the past 24 hrs:   Recent Results (from the past 24 hour(s))   Chest XR,  PA & LAT    Narrative    EXAM: XR CHEST 2 VIEWS  LOCATION: [facility]  DATE: 1/15/2024    INDICATION: Sepsis.  COMPARISON: Chest CT 12/26/2023.      Impression    IMPRESSION: Minimal basilar opacities may be from atelectasis. Remaining lungs are clear. No pleural effusion or pneumothorax. Normal heart size.     US Abdomen Limited    Narrative    EXAM: US ABDOMEN LIMITED  LOCATION: [facility]  DATE: 1/15/2024    INDICATION: fever, elevated bili and LFTs, eval for choleycstitis or choledocolithiasis  COMPARISON: CT abdomen and pelvis 03/13/2023  TECHNIQUE: Limited abdominal ultrasound.    FINDINGS:    GALLBLADDER: Normal. No gallstones, wall thickening, or pericholecystic fluid. Negative sonographic Sampson's sign.    BILE DUCTS: No biliary dilatation. The common duct measures 3 mm.    LIVER: Increased echogenicity from diffuse fatty infiltration. No focal mass.    RIGHT KIDNEY: No hydronephrosis.    PANCREAS: The visualized portions are normal.    No ascites.      Impression    IMPRESSION:  1.  No acute abnormalities.  2.  Hepatic steatosis.   CT Abdomen Pelvis w Contrast    Narrative    EXAM: CT ABDOMEN PELVIS W CONTRAST  LOCATION: [facility]  DATE: 1/15/2024    INDICATION: Abdominal pain. Elevated liver function tests. Sepsis. Evaluate source.  COMPARISON: Same-day ultrasound. 12/26/2023 CT.  TECHNIQUE: CT scan of the abdomen and pelvis was performed following injection  of IV contrast. Multiplanar reformats were obtained. Dose reduction techniques were used.  CONTRAST: 75ml isovue 370    FINDINGS:   LOWER CHEST: Normal.    HEPATOBILIARY: Mild to moderate hepatic steatosis. Otherwise, unremarkable.    PANCREAS: Normal.    SPLEEN: Normal.    ADRENAL GLANDS: Normal.    KIDNEYS/BLADDER: Minimal bladder wall thickening. Otherwise, negative.    BOWEL: Retained debris or appendicoliths in the appendix; otherwise, no wall thickening or surrounding stranding (no acute appendicitis). Remaining bowel negative.    LYMPH NODES: No adenopathy.    VASCULATURE: Nonaneurysmal aorta. Minimal atherosclerosis.    PELVIC ORGANS: Unremarkable.    MUSCULOSKELETAL: Unremarkable.      Impression    IMPRESSION:     1.  Minimal urinary bladder wall thickening; correlate with labs.     2.  No CT evidence of pyelonephritis.    3.  Otherwise, no acute findings to explain symptoms.    4.  No free fluid or air. No abscess.    5.  Hepatic steatosis.

## 2024-01-16 ENCOUNTER — APPOINTMENT (OUTPATIENT)
Dept: OCCUPATIONAL THERAPY | Facility: HOSPITAL | Age: 54
DRG: 871 | End: 2024-01-16
Attending: STUDENT IN AN ORGANIZED HEALTH CARE EDUCATION/TRAINING PROGRAM
Payer: COMMERCIAL

## 2024-01-16 ENCOUNTER — APPOINTMENT (OUTPATIENT)
Dept: MRI IMAGING | Facility: HOSPITAL | Age: 54
DRG: 871 | End: 2024-01-16
Attending: EMERGENCY MEDICINE
Payer: COMMERCIAL

## 2024-01-16 ENCOUNTER — APPOINTMENT (OUTPATIENT)
Dept: CARDIOLOGY | Facility: HOSPITAL | Age: 54
DRG: 871 | End: 2024-01-16
Attending: INTERNAL MEDICINE
Payer: COMMERCIAL

## 2024-01-16 LAB
ALBUMIN SERPL BCG-MCNC: 2.9 G/DL (ref 3.5–5.2)
ALP SERPL-CCNC: 143 U/L (ref 40–150)
ALT SERPL W P-5'-P-CCNC: 155 U/L (ref 0–50)
ANION GAP SERPL CALCULATED.3IONS-SCNC: 11 MMOL/L (ref 7–15)
AST SERPL W P-5'-P-CCNC: 144 U/L (ref 0–45)
BILIRUB DIRECT SERPL-MCNC: 2.06 MG/DL (ref 0–0.3)
BILIRUB SERPL-MCNC: 2.5 MG/DL
BUN SERPL-MCNC: 21.1 MG/DL (ref 6–20)
CALCIUM SERPL-MCNC: 7 MG/DL (ref 8.6–10)
CHLORIDE SERPL-SCNC: 112 MMOL/L (ref 98–107)
CREAT SERPL-MCNC: 1.21 MG/DL (ref 0.51–0.95)
DEPRECATED HCO3 PLAS-SCNC: 16 MMOL/L (ref 22–29)
EGFRCR SERPLBLD CKD-EPI 2021: 54 ML/MIN/1.73M2
ERYTHROCYTE [DISTWIDTH] IN BLOOD BY AUTOMATED COUNT: 13.6 % (ref 10–15)
GLUCOSE BLDC GLUCOMTR-MCNC: 116 MG/DL (ref 70–99)
GLUCOSE BLDC GLUCOMTR-MCNC: 91 MG/DL (ref 70–99)
GLUCOSE SERPL-MCNC: 100 MG/DL (ref 70–99)
HCT VFR BLD AUTO: 28.7 % (ref 35–47)
HGB BLD-MCNC: 9.3 G/DL (ref 11.7–15.7)
INR PPP: 2.28 (ref 0.85–1.15)
LACTATE SERPL-SCNC: 1.4 MMOL/L (ref 0.7–2)
LVEF ECHO: NORMAL
MAGNESIUM SERPL-MCNC: 1.5 MG/DL (ref 1.7–2.3)
MAGNESIUM SERPL-MCNC: 2.9 MG/DL (ref 1.7–2.3)
MCH RBC QN AUTO: 28.7 PG (ref 26.5–33)
MCHC RBC AUTO-ENTMCNC: 32.4 G/DL (ref 31.5–36.5)
MCV RBC AUTO: 89 FL (ref 78–100)
PHOSPHATE SERPL-MCNC: 1.4 MG/DL (ref 2.5–4.5)
PHOSPHATE SERPL-MCNC: 2.3 MG/DL (ref 2.5–4.5)
PLATELET # BLD AUTO: 141 10E3/UL (ref 150–450)
POTASSIUM SERPL-SCNC: 3.3 MMOL/L (ref 3.4–5.3)
POTASSIUM SERPL-SCNC: 3.9 MMOL/L (ref 3.4–5.3)
PROCALCITONIN SERPL IA-MCNC: 8.39 NG/ML
PROT SERPL-MCNC: 5 G/DL (ref 6.4–8.3)
RBC # BLD AUTO: 3.24 10E6/UL (ref 3.8–5.2)
SODIUM SERPL-SCNC: 139 MMOL/L (ref 135–145)
WBC # BLD AUTO: 7 10E3/UL (ref 4–11)

## 2024-01-16 PROCEDURE — 258N000003 HC RX IP 258 OP 636: Performed by: INTERNAL MEDICINE

## 2024-01-16 PROCEDURE — 84145 PROCALCITONIN (PCT): CPT | Performed by: INTERNAL MEDICINE

## 2024-01-16 PROCEDURE — 36415 COLL VENOUS BLD VENIPUNCTURE: CPT | Performed by: STUDENT IN AN ORGANIZED HEALTH CARE EDUCATION/TRAINING PROGRAM

## 2024-01-16 PROCEDURE — A9585 GADOBUTROL INJECTION: HCPCS | Performed by: INTERNAL MEDICINE

## 2024-01-16 PROCEDURE — 99233 SBSQ HOSP IP/OBS HIGH 50: CPT | Performed by: INTERNAL MEDICINE

## 2024-01-16 PROCEDURE — 82040 ASSAY OF SERUM ALBUMIN: CPT | Performed by: STUDENT IN AN ORGANIZED HEALTH CARE EDUCATION/TRAINING PROGRAM

## 2024-01-16 PROCEDURE — 255N000002 HC RX 255 OP 636: Performed by: INTERNAL MEDICINE

## 2024-01-16 PROCEDURE — 84132 ASSAY OF SERUM POTASSIUM: CPT | Performed by: INTERNAL MEDICINE

## 2024-01-16 PROCEDURE — 85027 COMPLETE CBC AUTOMATED: CPT | Performed by: STUDENT IN AN ORGANIZED HEALTH CARE EDUCATION/TRAINING PROGRAM

## 2024-01-16 PROCEDURE — 83605 ASSAY OF LACTIC ACID: CPT | Performed by: INTERNAL MEDICINE

## 2024-01-16 PROCEDURE — 258N000003 HC RX IP 258 OP 636: Performed by: STUDENT IN AN ORGANIZED HEALTH CARE EDUCATION/TRAINING PROGRAM

## 2024-01-16 PROCEDURE — 250N000013 HC RX MED GY IP 250 OP 250 PS 637: Performed by: INTERNAL MEDICINE

## 2024-01-16 PROCEDURE — 120N000001 HC R&B MED SURG/OB

## 2024-01-16 PROCEDURE — 85610 PROTHROMBIN TIME: CPT | Performed by: STUDENT IN AN ORGANIZED HEALTH CARE EDUCATION/TRAINING PROGRAM

## 2024-01-16 PROCEDURE — 84100 ASSAY OF PHOSPHORUS: CPT | Performed by: HOSPITALIST

## 2024-01-16 PROCEDURE — 36415 COLL VENOUS BLD VENIPUNCTURE: CPT | Performed by: HOSPITALIST

## 2024-01-16 PROCEDURE — 93306 TTE W/DOPPLER COMPLETE: CPT | Mod: 26 | Performed by: INTERNAL MEDICINE

## 2024-01-16 PROCEDURE — 36415 COLL VENOUS BLD VENIPUNCTURE: CPT | Performed by: INTERNAL MEDICINE

## 2024-01-16 PROCEDURE — 999N000208 ECHOCARDIOGRAM COMPLETE

## 2024-01-16 PROCEDURE — 74183 MRI ABD W/O CNTR FLWD CNTR: CPT

## 2024-01-16 PROCEDURE — 84100 ASSAY OF PHOSPHORUS: CPT | Performed by: STUDENT IN AN ORGANIZED HEALTH CARE EDUCATION/TRAINING PROGRAM

## 2024-01-16 PROCEDURE — 82248 BILIRUBIN DIRECT: CPT | Performed by: STUDENT IN AN ORGANIZED HEALTH CARE EDUCATION/TRAINING PROGRAM

## 2024-01-16 PROCEDURE — 83735 ASSAY OF MAGNESIUM: CPT | Performed by: INTERNAL MEDICINE

## 2024-01-16 PROCEDURE — 250N000011 HC RX IP 250 OP 636: Performed by: STUDENT IN AN ORGANIZED HEALTH CARE EDUCATION/TRAINING PROGRAM

## 2024-01-16 PROCEDURE — C9113 INJ PANTOPRAZOLE SODIUM, VIA: HCPCS | Performed by: INTERNAL MEDICINE

## 2024-01-16 PROCEDURE — 250N000009 HC RX 250: Performed by: INTERNAL MEDICINE

## 2024-01-16 PROCEDURE — 83735 ASSAY OF MAGNESIUM: CPT | Performed by: STUDENT IN AN ORGANIZED HEALTH CARE EDUCATION/TRAINING PROGRAM

## 2024-01-16 PROCEDURE — 250N000011 HC RX IP 250 OP 636: Performed by: INTERNAL MEDICINE

## 2024-01-16 PROCEDURE — 999N000111 HC STATISTIC OT IP EVAL DEFER

## 2024-01-16 PROCEDURE — 250N000013 HC RX MED GY IP 250 OP 250 PS 637: Performed by: STUDENT IN AN ORGANIZED HEALTH CARE EDUCATION/TRAINING PROGRAM

## 2024-01-16 RX ORDER — WARFARIN SODIUM 2 MG/1
4 TABLET ORAL
Status: COMPLETED | OUTPATIENT
Start: 2024-01-16 | End: 2024-01-16

## 2024-01-16 RX ORDER — POTASSIUM CHLORIDE 1500 MG/1
40 TABLET, EXTENDED RELEASE ORAL ONCE
Status: COMPLETED | OUTPATIENT
Start: 2024-01-16 | End: 2024-01-16

## 2024-01-16 RX ORDER — GADOBUTROL 604.72 MG/ML
11 INJECTION INTRAVENOUS ONCE
Status: COMPLETED | OUTPATIENT
Start: 2024-01-16 | End: 2024-01-16

## 2024-01-16 RX ORDER — MAGNESIUM SULFATE 4 G/50ML
4 INJECTION INTRAVENOUS ONCE
Status: COMPLETED | OUTPATIENT
Start: 2024-01-16 | End: 2024-01-16

## 2024-01-16 RX ADMIN — ACETAMINOPHEN 325 MG: 325 TABLET ORAL at 17:58

## 2024-01-16 RX ADMIN — GADOBUTROL 11 ML: 604.72 INJECTION INTRAVENOUS at 08:24

## 2024-01-16 RX ADMIN — Medication 1 LOZENGE: at 14:29

## 2024-01-16 RX ADMIN — THYROID, PORCINE 180 MG: 60 TABLET ORAL at 22:36

## 2024-01-16 RX ADMIN — WARFARIN SODIUM 4 MG: 2 TABLET ORAL at 17:58

## 2024-01-16 RX ADMIN — ACETAMINOPHEN 325 MG: 325 TABLET ORAL at 09:07

## 2024-01-16 RX ADMIN — SODIUM CHLORIDE: 9 INJECTION, SOLUTION INTRAVENOUS at 20:48

## 2024-01-16 RX ADMIN — PIPERACILLIN AND TAZOBACTAM 3.38 G: 3; .375 INJECTION, POWDER, FOR SOLUTION INTRAVENOUS at 01:18

## 2024-01-16 RX ADMIN — PERFLUTREN 2 ML: 6.52 INJECTION, SUSPENSION INTRAVENOUS at 13:54

## 2024-01-16 RX ADMIN — PIPERACILLIN AND TAZOBACTAM 3.38 G: 3; .375 INJECTION, POWDER, FOR SOLUTION INTRAVENOUS at 17:59

## 2024-01-16 RX ADMIN — VANCOMYCIN HYDROCHLORIDE 1000 MG: 1 INJECTION, SOLUTION INTRAVENOUS at 12:47

## 2024-01-16 RX ADMIN — ACETAZOLAMIDE 500 MG: 250 TABLET ORAL at 22:35

## 2024-01-16 RX ADMIN — PANTOPRAZOLE SODIUM 40 MG: 40 INJECTION, POWDER, FOR SOLUTION INTRAVENOUS at 09:08

## 2024-01-16 RX ADMIN — SODIUM PHOSPHATE, MONOBASIC, MONOHYDRATE AND SODIUM PHOSPHATE, DIBASIC, ANHYDROUS 15 MMOL: 142; 276 INJECTION, SOLUTION INTRAVENOUS at 12:47

## 2024-01-16 RX ADMIN — Medication 1 LOZENGE: at 22:35

## 2024-01-16 RX ADMIN — SODIUM CHLORIDE: 9 INJECTION, SOLUTION INTRAVENOUS at 03:20

## 2024-01-16 RX ADMIN — POTASSIUM CHLORIDE 40 MEQ: 1500 TABLET, EXTENDED RELEASE ORAL at 09:07

## 2024-01-16 RX ADMIN — SODIUM PHOSPHATE, MONOBASIC, MONOHYDRATE AND SODIUM PHOSPHATE, DIBASIC, ANHYDROUS 15 MMOL: 142; 276 INJECTION, SOLUTION INTRAVENOUS at 15:11

## 2024-01-16 RX ADMIN — PIPERACILLIN AND TAZOBACTAM 3.38 G: 3; .375 INJECTION, POWDER, FOR SOLUTION INTRAVENOUS at 10:30

## 2024-01-16 RX ADMIN — MAGNESIUM SULFATE HEPTAHYDRATE 4 G: 4 INJECTION, SOLUTION INTRAVENOUS at 09:13

## 2024-01-16 RX ADMIN — THYROID, PORCINE 180 MG: 60 TABLET ORAL at 20:49

## 2024-01-16 ASSESSMENT — ACTIVITIES OF DAILY LIVING (ADL)
ADLS_ACUITY_SCORE: 37

## 2024-01-16 NOTE — PROGRESS NOTES
"[facility] Progress Note    Subjective: Patient reports she is overall feeling much better.  No fevers since yesterday.  Tachycardia has improved.    Objective:  BP 98/50 (BP Location: Left arm)   Pulse 106   Temp 97.8  F (36.6  C) (Oral)   Resp 18   Ht 1.626 m (5' 4\")   Wt 113.4 kg (250 lb)   LMP 12/13/2023 (Exact Date)   SpO2 97%   BMI 42.91 kg/m     Gen: Awake, no acute distress  Gastrointestinal: soft, non-tender, non-distended, no rebound/guarding      Patient Active Problem List   Diagnosis    Cerebral venous thrombosis in puerperium    Factor 5 Leiden mutation, heterozygous (H24)    Thromboembolic disorder (H)    History of chronic urticaria    Obesity    Obstructive sleep apnea syndrome    Injury of head, initial encounter    Closed fracture of orbital floor, unspecified laterality, initial encounter (H)    Tachycardia    Acquired hypothyroidism    Attention deficit hyperactivity disorder (ADHD), predominantly inattentive type    Factor V deficiency (H)    Gastroesophageal reflux disease    Headache, unspecified    Hypomagnesemia    Major depressive disorder, recurrent, moderate (H)    Other allergic rhinitis    Personal history of pulmonary embolism    Pseudotumor cerebri    Urticaria    Vitamin D deficiency    Warfarin anticoagulation    Morbid (severe) obesity due to excess calories (H)    Sinus tachycardia    Elevated LFTs    Septic shock (H)    Sepsis (H)       Labs:  Recent Labs   Lab Test 01/16/24  0557   WBC 7.0   RBC 3.24*   HGB 9.3*   HCT 28.7*   MCV 89   MCH 28.7   MCHC 32.4   RDW 13.6   *      Sodium   Date Value Ref Range Status   01/16/2024 139 135 - 145 mmol/L Final     Comment:     Reference intervals for this test were updated on 09/26/2023 to more accurately reflect our healthy population. There may be differences in the flagging of prior results with similar values performed with this method. Interpretation of those prior results can be made in the context of the " updated reference intervals.      Potassium   Date Value Ref Range Status   01/16/2024 3.3 (L) 3.4 - 5.3 mmol/L Final   06/17/2022 4.5 3.5 - 5.0 mmol/L Final     Chloride   Date Value Ref Range Status   01/16/2024 112 (H) 98 - 107 mmol/L Final   06/17/2022 110 (H) 98 - 107 mmol/L Final     Carbon Dioxide (CO2)   Date Value Ref Range Status   01/16/2024 16 (L) 22 - 29 mmol/L Final   06/17/2022 20 (L) 22 - 31 mmol/L Final     Anion Gap   Date Value Ref Range Status   01/16/2024 11 7 - 15 mmol/L Final   06/17/2022 11 5 - 18 mmol/L Final     Glucose   Date Value Ref Range Status   01/16/2024 100 (H) 70 - 99 mg/dL Final   06/17/2022 91 70 - 125 mg/dL Final     GLUCOSE BY METER POCT   Date Value Ref Range Status   01/16/2024 91 70 - 99 mg/dL Final     Urea Nitrogen   Date Value Ref Range Status   01/16/2024 21.1 (H) 6.0 - 20.0 mg/dL Final   06/17/2022 9 8 - 22 mg/dL Final     Creatinine   Date Value Ref Range Status   01/16/2024 1.21 (H) 0.51 - 0.95 mg/dL Final     GFR Estimate   Date Value Ref Range Status   01/16/2024 54 (L) >60 mL/min/1.73m2 Final   02/13/2021 >60 >60 mL/min/1.73m2 Final     Calcium   Date Value Ref Range Status   01/16/2024 7.0 (L) 8.6 - 10.0 mg/dL Final      Recent Labs   Lab Test 01/16/24  0557   PROTTOTAL 5.0*   ALBUMIN 2.9*   BILITOTAL 2.5*   ALKPHOS 143   *   *      INR   Date Value Ref Range Status   01/16/2024 2.28 (H) 0.85 - 1.15 Final   09/01/2005 2.37 (H) 0.86 - 1.14 Final      Acute hepatitis panel negative    Image(s):      Impression: This is a 53-year-old female who presented with fevers and palpitations.  She reports that she was feeling well until she ate out at [facility] 2 days prior to presentation.  She ordered a medium steak but she reports it was relatively rare.  After dinner she developed some nausea and 1 episode of vomiting and 1 episode of nonbloody diarrhea.  She also developed a fever to approximately 103.  She took Advil and her fever decreased to 99 or  100 but then recurred on 1/15.  She also had palpitations.  Reports she had flulike symptoms and palpitations a few weeks ago and was evaluated.  Her LFTs on 12/26/23 were normal.  Her LFTs on 1/15 showed elevation in her transaminases, alkaline phosphatase, as well as her bilirubin.  She reports that she has had intermittent right upper quadrant pain for a few months but has not had any abdominal pain recently.  She denies any abdominal pain or other GI symptoms at this time.  She had a fever to 102.9 in the ER.  She was tachycardic and mildly hypotensive but her vitals have improved.  She had a right upper quadrant ultrasound and a CT scan of the abdomen and pelvis in the ER that did not show any evidence of biliary ductal dilation and no evidence of choledocholithiasis or gallstones.  MRCP yesterday was unrevealing with no evidence of gallstones or choledocholithiasis.  Her LFTs are trending down.  Patient is feeling much better.     Recommendation:   -Advance diet as tolerated  -Continue antibiotics for now  -Recheck LFTs tomorrow am    Rita Sandra MD  1/16/2024   [facility]

## 2024-01-16 NOTE — PROGRESS NOTES
01/16/24 1400   Appointment Info   Signing Clinician's Name / Credentials (PT) Gely Landeros PT   Appointment Canceled Reason (PT) Other (see Cancel Comments row)   Appointment Cancel Comments (PT) Per OT, the pt is indep and has not needs for PT.  PT to dc at this time.

## 2024-01-16 NOTE — PLAN OF CARE
Occupational Therapy: Orders received. Chart reviewed and discussed with care team.? Occupational Therapy not indicated due to met with pt and reviewed role of OT.  Pt denies therapy needs.  Pt is painful but feels she will be back to normal once she gets a little better.  Pt is up in room and bath with SBA. .? Defer discharge recommendations to MD.? Will complete orders.

## 2024-01-16 NOTE — PLAN OF CARE
Goal Outcome Evaluation:       VSS on RA, except tachy- pulse 116, 110      Denied pain, SOB, N/V     Slept between cares.

## 2024-01-16 NOTE — PLAN OF CARE
Problem: Sepsis/Septic Shock  Goal: Optimal Coping  Outcome: Progressing  Goal: Absence of Bleeding  Outcome: Progressing  Goal: Blood Glucose Level Within Targeted Range  Outcome: Progressing  Goal: Absence of Infection Signs and Symptoms  Outcome: Progressing  Goal: Optimal Nutrition Intake  Outcome: Progressing     Problem: Infection  Goal: Absence of Infection Signs and Symptoms  Outcome: Progressing   Goal Outcome Evaluation:       Patient is alert and oriented.  Vital signs are stable.  IV antibiotics given per MD order.  Mag, K+ and Phos protocols done per order.  Up to bathroom with standby assist.  Generalized red rash noted by patient after MRCP this morning.  Dr Ramirez aware.

## 2024-01-16 NOTE — PROGRESS NOTES
"Care Management Follow Up    Length of Stay (days): 1    Expected Discharge Date: 01/17/2024     Concerns to be Addressed:     Care progression  Patient plan of care discussed at interdisciplinary rounds: Yes    Anticipated Discharge Disposition:  TBD     Anticipated Discharge Services:  TBD  Anticipated Discharge DME:  [facility]    Patient/family educated on Medicare website which has current facility and service quality ratings:  NA  Education Provided on the Discharge Plan:  Yes per team  Patient/Family in Agreement with the Plan: NA     Referrals Placed by CM/SW:  NA  Private pay costs discussed: Not applicable    Additional Information:  Per provider, Dr. Ramirez, patient will be here for more days.    Social Hx: \"Lives w/spouse in a house and two minor children. Independent at baseline, works full time, and drives. Likely to return home. Spouse to transport.\"     RNCM to follow for medical progression, recommendations, and final discharge plan.     Berta Lala RN     "

## 2024-01-16 NOTE — PROGRESS NOTES
She denies any SOB and chest  pain,  pt reported she  had SOB with activity. Oxygen saturation maintained well on RA.  Rhythm Tachycardia with -110. BP is on low side, she denies any dizziness..

## 2024-01-16 NOTE — PROGRESS NOTES
[facility]    PROGRESS NOTE - Hospitalist Service    Assessment and Plan   53 year old female with a past medical history of factor V deficiency, GRD, hypothyroidism, pulmonary embolism, RENETTA, DVT in left leg, bilateral arms, and lungs who presents to this ED for evaluation of high heart rate and fever after eating steak at [email or web address] 2 days ago.  Admitted with acute hepatitis and sepsis secondary to UTI      UTI with severe sepsis and lactic acidosis  - Patient presented with fever, tachycardia, hypotension and lactic acidosis.  -Procalcitonin of 7.4 on admission and lactic acid of 5.8  - Improving with aggressive IV hydration and antibiotic with vancomycin and Zosyn   - CT abdomen is negative for pyelonephritis  - Follow-up on urine and blood culture  - Check echo  - Continue IV fluids support and antibiotics  - Continue to monitor lactic acid    Acute hepatitis  - Etiology is unclear  - Patient developed nausea and vomiting with fever after eating steak at [employer]  - CT abdomen shows no evidence of pyelonephritis and hepatic steatosis  - Abdominal ultrasound is positive for hepatic steatosis with no gallstones.  - Negative acute hepatitis panel  - GI consult, appreciate input  - MRCP is negative for choledocholithiasis or biliary dilatation  - Continue to monitor LFTs.     Acute kidney injury  - Probably prerenal secondary to dehydration  - Improving with IV fluid support  - Avoid nephrotoxic drugs  - Continue to monitor renal function    History of factor V deficiency with DVT  - Resume home warfarin  - INR is therapeutic  - Pharmacy to dose warfarin    Hypokalemia, hypomagnesemia and hypophosphatemia  - Replace per protocol    Hypothyroidism  - Resume home Synthroid    GERD  -Continue PPI    History of sleep apnea  - Continue with CPAP    50 MINUTES SPENT BY ME on the date of service doing chart review, history, exam, documentation & further activities per the  note    Principal Problem:    Sinus tachycardia  Active Problems:    Elevated LFTs    Septic shock (H)    Sepsis (H)      VTE prophylaxis:  Warfarin  DIET: Orders Placed This Encounter      Combination Diet Regular Diet Adult      Disposition/Barriers to discharge: IV antibiotic, pending culture, monitor renal function and LFTs  Code Status: Full Code    Subjective:  Anna is feeling better today, still has some body ache but is improving.  Improving nausea.  Tolerating clear liquid diet    PHYSICAL EXAM  Vitals:    01/15/24 1031   Weight: 113.4 kg (250 lb)     B/P:103/60 T:98.4 P:102 R:18     Intake/Output Summary (Last 24 hours) at 1/16/2024 1546  Last data filed at 1/15/2024 2200  Gross per 24 hour   Intake 360 ml   Output --   Net 360 ml      Body mass index is 42.91 kg/m .    Constitutional: awake, alert, cooperative, no apparent distress, and appears stated age  Respiratory: No increased work of breathing, good air exchange, clear to auscultation bilaterally, no crackles or wheezing  Cardiovascular: Normal apical impulse, regular rate and rhythm, normal S1 and S2, no S3 or S4, and no murmur noted  GI: No scars, normal bowel sounds, soft, non-distended, non-tender, no masses palpated, no hepatosplenomegally  Skin: no bruising or bleeding and normal skin color, texture, turgor  Musculoskeletal: There is no redness, warmth, or swelling of the joints.  Full range of motion noted.  no lower extremity pitting edema present  Neurologic: Awake, alert, oriented to name, place and time.  Cranial nerves II-XII are grossly intact.  Motor is 5 out of 5 bilaterally.   Sensory is intact.    Neuropsychiatric: Appropriate with examiner      PERTINENT LABS/IMAGING:    I have personally reviewed the following data over the past 24 hrs:    7.0  \   9.3 (L)   / 141 (L)     139 112 (H) 21.1 (H) /  100 (H)   3.9 16 (L) 1.21 (H) \     ALT: 155 (H) AST: 144 (H) AP: 143 TBILI: 2.5 (H)   ALB: 2.9 (L) TOT PROTEIN: 5.0 (L) LIPASE: N/A      Trop: 10 BNP: N/A     Procal: 8.39 (HH) CRP: N/A Lactic Acid: 1.4       INR:  2.28 (H) PTT:  N/A   D-dimer:  N/A Fibrinogen:  N/A       Imaging results reviewed over the past 24 hrs:   Recent Results (from the past 24 hour(s))   MR Abdomen MRCP w/o & w Contrast    Narrative    EXAM: MR ABDOMEN MRCP W/O and W CONTRAST  LOCATION: [facility]  DATE: 2024    INDICATION: Elevated liver enzymes and bilirubin. Fever.  COMPARISON: CT abdomen pelvis 01/15/2024. Ultrasound 01/15/2024.  TECHNIQUE: Routine MR liver/pancreas protocol including axial and coronal MRCP sequences. 2D and 3D reconstruction performed by MR technologist including MIP reconstruction and slab cholangiograms. If performed with contrast, additional dynamic T1 post   IV contrast images.   CONTRAST: 11ml Gadavist     FINDINGS:     LIVER: Diffuse hepatic steatosis. No morphologic features of cirrhosis. No focal liver lesions.    GALLBLADDER/BILIARY: Normal gallbladder. No gallstones. No biliary ductal dilation. No choledocholiths or biliary strictures. No biliary wall thickening, enhancement, or other signs of acute cholangitis.    PANCREAS: Normal.    SPLEEN: Normal.    ADRENALS: Normal.    KIDNEYS: Normal.    BOWEL: No obstruction or inflammation.    LYMPH NODES: Normal.    VASCULATURE: Patent portal, splenic, and superior mesenteric veins. No abdominal aortic aneurysm.    LUNG BASES: Normal.    MUSCULOSKELETAL: No acute bony abnormality.      Impression    IMPRESSION:    1.  No choledocholiths, biliary ductal dilation, or imaging evidence of acute cholangitis.    2.  Fatty liver.   Echocardiogram Complete   Result Value    LVEF  60-65%    Narrative    [id number]  [id number]  [id number]  [id number]^MELITA^[place] [address]     Name: JAVIER MCCLAIN  MRN: [id number]  : 1970  Study Date: 2024 12:57 PM  Age: 53 yrs  Gender: Female  Patient Location: [facility]  Reason  For Study: Dyspnea  Ordering Physician: STEFF HUA  Performed By: ZEKE     BSA: 2.2 m2  Height: 64 in  Weight: 250 lb  HR: 100  BP: 127/94 mmHg  ______________________________________________________________________________  Procedure  Complete Echo Adult. Definity (NDC #13204-718) given intravenously. Adequate  quality two-dimensional was performed and interpreted. No hemodynamically  significant valvular abnormalities on 2D or color flow imaging. There is no  comparison study available.  ______________________________________________________________________________  Interpretation Summary     Left ventricular size, wall motion and function are normal. The ejection  fraction is 60-65%. Left ventricular diastolic function is normal.  Normal right ventricle size and systolic function.  Both atria are of normal size.  No hemodynamically significant valvular abnormalities on 2D or color flow  imaging.  There is no comparison study available.  ______________________________________________________________________________  Left Ventricle  Left ventricular size, wall motion and function are normal. The ejection  fraction is 60-65%. There is normal left ventricular wall thickness. Left  ventricular diastolic function is normal.     Right Ventricle  Normal right ventricle size and systolic function.     Atria  Normal left atrial size. Right atrial size is normal. There is no color  Doppler evidence of an atrial shunt.     Mitral Valve  Mitral valve leaflets appear normal. There is no evidence of mitral stenosis  or clinically significant mitral regurgitation.     Tricuspid Valve  Tricuspid valve leaflets appear normal. There is no evidence of tricuspid  stenosis or clinically significant tricuspid regurgitation. Right ventricular  systolic pressure could not be approximated due to inadequate tricuspid  regurgitation.     Aortic Valve  The aortic valve is trileaflet. Aortic valve leaflets appear normal. There is  no  evidence of aortic stenosis or clinically significant aortic regurgitation.     Pulmonic Valve  The pulmonic valve is not well seen, but is grossly normal. This degree of  valvular regurgitation is within normal limits. There is trace pulmonic  valvular regurgitation.     Vessels  The aorta root is normal. Normal size ascending aorta. IVC diameter <2.1 cm  collapsing >50% with sniff suggests a normal RA pressure of 3 mmHg.     Pericardium  There is no pericardial effusion.     Rhythm  Sinus rhythm was noted.  ______________________________________________________________________________  MMode/2D Measurements & Calculations     IVSd: 0.88 cm  LVIDd: 4.6 cm  LVIDs: 3.1 cm  LVPWd: 0.74 cm  FS: 32.2 %  LV mass(C)d: 117.6 grams  LV mass(C)dI: 54.7 grams/m2  LA dimension: 3.6 cm  asc Aorta Diam: 2.9 cm  LVOT diam: 2.1 cm  LVOT area: 3.5 cm2  Asc Ao diam index BSA (cm/m2): 1.3  Asc Ao diam index Ht(cm/m): 1.8  LA Volume (BP): 50.0 ml     LA Volume Index (BP): 23.3 ml/m2  LA Volume Indexed (AL/bp): 26.2 ml/m2  RV Base: 3.3 cm  RWT: 0.32  TAPSE: 1.9 cm     Doppler Measurements & Calculations  MV E max fabián: 80.6 cm/sec  MV A max fabián: 71.1 cm/sec  MV E/A: 1.1  MV dec slope: 740.0 cm/sec2  MV dec time: 0.11 sec     Ao V2 max: 168.0 cm/sec  Ao max P.0 mmHg  Ao V2 mean: 131.0 cm/sec  Ao mean P.0 mmHg  Ao V2 VTI: 29.6 cm  JEN(I,D): 2.2 cm2  JEN(V,D): 2.1 cm2  LV V1 max P.2 mmHg  LV V1 max: 102.0 cm/sec  LV V1 VTI: 18.6 cm  SV(LVOT): 64.4 ml  SI(LVOT): 30.0 ml/m2  PA V2 max: 84.4 cm/sec  PA max P.8 mmHg  PA acc time: 0.10 sec  AV Fabián Ratio (DI): 0.61  JEN Index (cm2/m2): 1.0  E/E' av.3  Lateral E/e': 6.5  Medial E/e': 8.1  RV S Fabián: 13.9 cm/sec     ______________________________________________________________________________  Report approved by: Thee Coronel 2024 02:09 PM             Discussed with patient, family, GI, nursing staff and discharge planner    Bulmaro Ramirez MD  [facility]  [facility]  [phone]

## 2024-01-17 ENCOUNTER — APPOINTMENT (OUTPATIENT)
Dept: CT IMAGING | Facility: HOSPITAL | Age: 54
DRG: 871 | End: 2024-01-17
Attending: HOSPITALIST
Payer: COMMERCIAL

## 2024-01-17 PROBLEM — D68.51 FACTOR 5 LEIDEN MUTATION, HETEROZYGOUS (H): Status: ACTIVE | Noted: 2019-07-09

## 2024-01-17 PROBLEM — S09.90XA INJURY OF HEAD, INITIAL ENCOUNTER: Status: RESOLVED | Noted: 2023-05-15 | Resolved: 2024-01-17

## 2024-01-17 PROBLEM — N17.9 AKI (ACUTE KIDNEY INJURY) (H): Status: ACTIVE | Noted: 2024-01-17

## 2024-01-17 LAB
ALBUMIN SERPL BCG-MCNC: 2.8 G/DL (ref 3.5–5.2)
ALP SERPL-CCNC: 196 U/L (ref 40–150)
ALT SERPL W P-5'-P-CCNC: 163 U/L (ref 0–50)
ANION GAP SERPL CALCULATED.3IONS-SCNC: 11 MMOL/L (ref 7–15)
AST SERPL W P-5'-P-CCNC: 133 U/L (ref 0–45)
BACTERIA UR CULT: NORMAL
BILIRUB SERPL-MCNC: 1.3 MG/DL
BUN SERPL-MCNC: 12.8 MG/DL (ref 6–20)
CALCIUM SERPL-MCNC: 7.1 MG/DL (ref 8.6–10)
CHLORIDE SERPL-SCNC: 113 MMOL/L (ref 98–107)
CREAT SERPL-MCNC: 0.95 MG/DL (ref 0.51–0.95)
DEPRECATED HCO3 PLAS-SCNC: 16 MMOL/L (ref 22–29)
EGFRCR SERPLBLD CKD-EPI 2021: 72 ML/MIN/1.73M2
ERYTHROCYTE [DISTWIDTH] IN BLOOD BY AUTOMATED COUNT: 14.2 % (ref 10–15)
GLUCOSE BLDC GLUCOMTR-MCNC: 111 MG/DL (ref 70–99)
GLUCOSE BLDC GLUCOMTR-MCNC: 92 MG/DL (ref 70–99)
GLUCOSE SERPL-MCNC: 121 MG/DL (ref 70–99)
HCT VFR BLD AUTO: 30.6 % (ref 35–47)
HGB BLD-MCNC: 9.6 G/DL (ref 11.7–15.7)
INR PPP: 3.1 (ref 0.85–1.15)
MAGNESIUM SERPL-MCNC: 2.5 MG/DL (ref 1.7–2.3)
MCH RBC QN AUTO: 28.4 PG (ref 26.5–33)
MCHC RBC AUTO-ENTMCNC: 31.4 G/DL (ref 31.5–36.5)
MCV RBC AUTO: 91 FL (ref 78–100)
PHOSPHATE SERPL-MCNC: 2.8 MG/DL (ref 2.5–4.5)
PLATELET # BLD AUTO: 148 10E3/UL (ref 150–450)
POTASSIUM SERPL-SCNC: 4.1 MMOL/L (ref 3.4–5.3)
PROCALCITONIN SERPL IA-MCNC: 4.66 NG/ML
PROT SERPL-MCNC: 5.1 G/DL (ref 6.4–8.3)
RBC # BLD AUTO: 3.38 10E6/UL (ref 3.8–5.2)
SODIUM SERPL-SCNC: 140 MMOL/L (ref 135–145)
WBC # BLD AUTO: 6.6 10E3/UL (ref 4–11)

## 2024-01-17 PROCEDURE — 250N000013 HC RX MED GY IP 250 OP 250 PS 637: Performed by: STUDENT IN AN ORGANIZED HEALTH CARE EDUCATION/TRAINING PROGRAM

## 2024-01-17 PROCEDURE — C9113 INJ PANTOPRAZOLE SODIUM, VIA: HCPCS | Performed by: INTERNAL MEDICINE

## 2024-01-17 PROCEDURE — 250N000013 HC RX MED GY IP 250 OP 250 PS 637: Performed by: HOSPITALIST

## 2024-01-17 PROCEDURE — 250N000011 HC RX IP 250 OP 636: Performed by: STUDENT IN AN ORGANIZED HEALTH CARE EDUCATION/TRAINING PROGRAM

## 2024-01-17 PROCEDURE — 70486 CT MAXILLOFACIAL W/O DYE: CPT

## 2024-01-17 PROCEDURE — 250N000011 HC RX IP 250 OP 636: Performed by: INTERNAL MEDICINE

## 2024-01-17 PROCEDURE — 120N000001 HC R&B MED SURG/OB

## 2024-01-17 PROCEDURE — 99233 SBSQ HOSP IP/OBS HIGH 50: CPT | Performed by: HOSPITALIST

## 2024-01-17 PROCEDURE — 36415 COLL VENOUS BLD VENIPUNCTURE: CPT | Performed by: INTERNAL MEDICINE

## 2024-01-17 PROCEDURE — 84145 PROCALCITONIN (PCT): CPT | Performed by: INTERNAL MEDICINE

## 2024-01-17 PROCEDURE — 84100 ASSAY OF PHOSPHORUS: CPT | Performed by: HOSPITALIST

## 2024-01-17 PROCEDURE — 250N000013 HC RX MED GY IP 250 OP 250 PS 637

## 2024-01-17 PROCEDURE — 85610 PROTHROMBIN TIME: CPT | Performed by: STUDENT IN AN ORGANIZED HEALTH CARE EDUCATION/TRAINING PROGRAM

## 2024-01-17 PROCEDURE — 250N000009 HC RX 250: Performed by: HOSPITALIST

## 2024-01-17 PROCEDURE — 83735 ASSAY OF MAGNESIUM: CPT | Performed by: HOSPITALIST

## 2024-01-17 PROCEDURE — 258N000003 HC RX IP 258 OP 636: Performed by: HOSPITALIST

## 2024-01-17 PROCEDURE — 80053 COMPREHEN METABOLIC PANEL: CPT | Performed by: INTERNAL MEDICINE

## 2024-01-17 PROCEDURE — 85027 COMPLETE CBC AUTOMATED: CPT | Performed by: INTERNAL MEDICINE

## 2024-01-17 RX ORDER — WARFARIN SODIUM 2 MG/1
2 TABLET ORAL
Status: COMPLETED | OUTPATIENT
Start: 2024-01-17 | End: 2024-01-17

## 2024-01-17 RX ORDER — HYDROXYZINE HYDROCHLORIDE 25 MG/1
25 TABLET, FILM COATED ORAL EVERY 6 HOURS PRN
Status: DISCONTINUED | OUTPATIENT
Start: 2024-01-17 | End: 2024-01-19 | Stop reason: HOSPADM

## 2024-01-17 RX ORDER — HYDROXYZINE HYDROCHLORIDE 50 MG/1
50 TABLET, FILM COATED ORAL EVERY 6 HOURS PRN
Status: DISCONTINUED | OUTPATIENT
Start: 2024-01-17 | End: 2024-01-19 | Stop reason: HOSPADM

## 2024-01-17 RX ADMIN — ACETAZOLAMIDE 250 MG: 250 TABLET ORAL at 14:17

## 2024-01-17 RX ADMIN — ACETAMINOPHEN 325 MG: 325 TABLET ORAL at 18:38

## 2024-01-17 RX ADMIN — PIPERACILLIN AND TAZOBACTAM 3.38 G: 3; .375 INJECTION, POWDER, FOR SOLUTION INTRAVENOUS at 18:30

## 2024-01-17 RX ADMIN — ACETAMINOPHEN 325 MG: 325 TABLET ORAL at 07:46

## 2024-01-17 RX ADMIN — PIPERACILLIN AND TAZOBACTAM 3.38 G: 3; .375 INJECTION, POWDER, FOR SOLUTION INTRAVENOUS at 01:31

## 2024-01-17 RX ADMIN — HYDROXYZINE HYDROCHLORIDE 25 MG: 25 TABLET, FILM COATED ORAL at 22:14

## 2024-01-17 RX ADMIN — ACETAZOLAMIDE 500 MG: 250 TABLET ORAL at 22:14

## 2024-01-17 RX ADMIN — SODIUM PHOSPHATE, MONOBASIC, MONOHYDRATE AND SODIUM PHOSPHATE, DIBASIC, ANHYDROUS 15 MMOL: 142; 276 INJECTION, SOLUTION INTRAVENOUS at 01:32

## 2024-01-17 RX ADMIN — WARFARIN SODIUM 2 MG: 2 TABLET ORAL at 18:29

## 2024-01-17 RX ADMIN — PIPERACILLIN AND TAZOBACTAM 3.38 G: 3; .375 INJECTION, POWDER, FOR SOLUTION INTRAVENOUS at 10:31

## 2024-01-17 RX ADMIN — THYROID, PORCINE 180 MG: 60 TABLET ORAL at 22:14

## 2024-01-17 RX ADMIN — PANTOPRAZOLE SODIUM 40 MG: 40 INJECTION, POWDER, FOR SOLUTION INTRAVENOUS at 10:31

## 2024-01-17 ASSESSMENT — ACTIVITIES OF DAILY LIVING (ADL)
ADLS_ACUITY_SCORE: 37
ADLS_ACUITY_SCORE: 40
ADLS_ACUITY_SCORE: 37
ADLS_ACUITY_SCORE: 37
ADLS_ACUITY_SCORE: 40
ADLS_ACUITY_SCORE: 37

## 2024-01-17 NOTE — PLAN OF CARE
"  Problem: Adult Inpatient Plan of Care  Goal: Plan of Care Review  Description: The Plan of Care Review/Shift note should be completed every shift.  The Outcome Evaluation is a brief statement about your assessment that the patient is improving, declining, or no change.  This information will be displayed automatically on your shift  note.  Outcome: Progressing  Goal: Patient-Specific Goal (Individualized)  Description: You can add care plan individualizations to a care plan. Examples of Individualization might be:  \"Parent requests to be called daily at 9am for status\", \"I have a hard time hearing out of my right ear\", or \"Do not touch me to wake me up as it startles  me\".  Outcome: Progressing  Goal: Absence of Hospital-Acquired Illness or Injury  Outcome: Progressing  Intervention: Prevent Skin Injury  Recent Flowsheet Documentation  Taken 1/16/2024 1956 by Aaliyah Rascon, RN  Skin Protection: adhesive use limited  Goal: Optimal Comfort and Wellbeing  Outcome: Progressing  Goal: Readiness for Transition of Care  Outcome: Progressing     Problem: Infection  Goal: Absence of Infection Signs and Symptoms  Outcome: Progressing     Problem: Sepsis/Septic Shock  Goal: Optimal Coping  Outcome: Progressing  Goal: Absence of Bleeding  Outcome: Progressing  Goal: Blood Glucose Level Within Targeted Range  Outcome: Progressing  Intervention: Optimize Glycemic Control  Recent Flowsheet Documentation  Taken 1/16/2024 1956 by Aaliyah Rascon, RN  Glycemic Management: blood glucose monitored  Goal: Absence of Infection Signs and Symptoms  Outcome: Progressing  Intervention: Promote Stabilization  Recent Flowsheet Documentation  Taken 1/16/2024 1956 by Aaliyah Rascon, RN  Fluid/Electrolyte Management: intravenous fluids adjusted  Goal: Optimal Nutrition Intake  Outcome: Progressing   Goal Outcome Evaluation:  Patient states she feels better. Generalized rash noted all over body. Requested tylenol for body aches, " given and improved.

## 2024-01-17 NOTE — PROGRESS NOTES
[facility]    Medicine Progress Note - Hospitalist Service    Date of Admission:  1/15/2024    Assessment & Plan                Anna Nieves is a 53 year old female with history of factor V deficiency, GERD, hypothyroidism, pulmonary embolism, RENETTA, previous sagittal sinus thrombosis/DVT/PE, on anticoagulation who presented for evaluation of high heart rate and fever.  Admitted with acute hepatitis and sepsis attributed to UTI. Hospital Day: 3         Severe sepsis, unclear source, possibly urinary or sinus  - Patient presented with fever, tachycardia, hypotension and lactic acidosis. Denies urinary symptoms. Recent influenza A 2 weeks ago. No current lower respiratory symptoms. Does endorse body aches.  -Procalcitonin 7.4-->8.3-->4.6  - Has improved with aggressive IV hydration and broad spectrum antibiotics (vancomycin and Zosyn).  Will discontinue vancomycin today  - CT abdomen is negative for pyelonephritis  - UA +LE and blood, some squames, neg nitrite. urine culture with mixed nida, but there was bladder wall thickening on CT  -blood culture no growth  - echo unremarkable  -overall unconvinced we have a clear source of sepsis and other symptoms.  -does endorse history of orbital floor implant last May and current sinus pressure, nasal discharge and debris passage. Questioning implant infection or osteomyelitis could be playing a role, could have been provoked by recent Influenza illness. CT sinus fairly unremarkable, does mention some sinus opacification and mucosal thickening. Possibly sinusitis. Would prescribe a bit longer course of antibiotics at discharge and have her follow up with ENT outpatient soon.  - Continue IV fluids support (reduce rate) and antibiotics     Acute hepatitis  - Etiology is unclear  - Patient developed nausea and vomiting with fever after eating steak dinner. However also has had some subacute symptoms. Chronicity does not seem to support acute  "foodborne illness. No longer having any GI symptoms.  - CT abdomen shows hepatic steatosis and no evidence of pyelonephritis  - Abdominal ultrasound is positive for hepatic steatosis with no gallstones.  - Negative acute hepatitis panel  - GI saw, appreciate input  - MRCP is negative for choledocholithiasis or biliary dilatation  -possibly shock liver or elevated due to FOTNAINE  - Continue to monitor LFTs. Would have her recheck in a few days after discharge as well     Acute kidney injury  - Probably prerenal secondary to dehydration  - Improving with IV fluid support  - Avoid nephrotoxic drugs  - Continue to monitor renal function  -Decrease IV fluid rate a little bit     History of factor V deficiency with DVT/PE  - INR is therapeutic  - Pharmacy to dose warfarin     Hypokalemia, hypomagnesemia and hypophosphatemia  - Replace per protocol     Hypothyroidism  -Continue home Synthroid     GERD  -Continue PPI     History of sleep apnea  - Continue with CPAP    Pseudotumor cerebri  -Continue home Diamox    Obesity  -BMI 42       DVT Prophylaxis: High risk. Coumadin  Diet: Combination Diet Regular Diet Adult    Martin Catheter: Not present  Lines: None     Cardiac Monitoring: ACTIVE order. Indication: Tachyarrhythmias, acute (48 hours)  Code Status: Full Code      Clinically Significant Risk Factors        # Hypokalemia: Lowest K = 3.3 mmol/L in last 2 days, will replace as needed     # Hypomagnesemia: Lowest Mg = 1.5 mg/dL in last 2 days, will replace as needed   # Hypoalbuminemia: Lowest albumin = 2.8 g/dL at 1/17/2024  6:59 AM, will monitor as appropriate            # Severe Obesity: Estimated body mass index is 42.91 kg/m  as calculated from the following:    Height as of this encounter: 1.626 m (5' 4\").    Weight as of this encounter: 113.4 kg (250 lb)., PRESENT ON ADMISSION     # Financial/Environmental Concerns: none         Disposition Plan   Disposition: Home     Expected Discharge Date: 01/18/2024      "   Discharge Comments: ADAT, pending improvement of labs     Medically ready to discharge today: No     The patient's care was discussed with the Patient.    Kelsey Hood MD  Hospitalist Service  [facility]  Securely message with Jose L (more info)  Text page via [employer] Paging/Directory   ______________________________________________________________________      Physical Exam   Vital Signs: Temp: 97.7  F (36.5  C) Temp src: Oral BP: 112/64 Pulse: 86   Resp: 18 SpO2: 99 % O2 Device: None (Room air)    Weight: 250 lbs 0 oz  General: in no apparent distress, non-toxic, and alert female lying in hospital bed oriented x3  HEENT: Head normocephalic atraumatic, oral mucosa moist. Sclerae anicteric  CV: Regular rhythm, normal rate, no murmurs  Resp: No wheezes, no rales or rhonchi, no focal consolidations  GI: Belly soft, nondistended, nontender, bowel sounds present  Skin: No rashes or lesions  Extremities: 1+ pitting edema bilateral ankles and hands  Psych: Normal affect, mood dysthymic  Neuro: Grossly normal        Medical Decision Making               Data   Recent Results (from the past 12 hour(s))   Magnesium    Collection Time: 01/17/24  6:59 AM   Result Value Ref Range    Magnesium 2.5 (H) 1.7 - 2.3 mg/dL   Phosphorus    Collection Time: 01/17/24  6:59 AM   Result Value Ref Range    Phosphorus 2.8 2.5 - 4.5 mg/dL   INR    Collection Time: 01/17/24  6:59 AM   Result Value Ref Range    INR 3.10 (H) 0.85 - 1.15   CBC with platelets    Collection Time: 01/17/24  6:59 AM   Result Value Ref Range    WBC Count 6.6 4.0 - 11.0 10e3/uL    RBC Count 3.38 (L) 3.80 - 5.20 10e6/uL    Hemoglobin 9.6 (L) 11.7 - 15.7 g/dL    Hematocrit 30.6 (L) 35.0 - 47.0 %    MCV 91 78 - 100 fL    MCH 28.4 26.5 - 33.0 pg    MCHC 31.4 (L) 31.5 - 36.5 g/dL    RDW 14.2 10.0 - 15.0 %    Platelet Count 148 (L) 150 - 450 10e3/uL   Procalcitonin    Collection Time: 01/17/24  6:59 AM   Result Value Ref Range    Procalcitonin 4.66  "(H) <0.50 ng/mL   Comprehensive metabolic panel    Collection Time: 01/17/24  6:59 AM   Result Value Ref Range    Sodium 140 135 - 145 mmol/L    Potassium 4.1 3.4 - 5.3 mmol/L    Carbon Dioxide (CO2) 16 (L) 22 - 29 mmol/L    Anion Gap 11 7 - 15 mmol/L    Urea Nitrogen 12.8 6.0 - 20.0 mg/dL    Creatinine 0.95 0.51 - 0.95 mg/dL    GFR Estimate 72 >60 mL/min/1.73m2    Calcium 7.1 (L) 8.6 - 10.0 mg/dL    Chloride 113 (H) 98 - 107 mmol/L    Glucose 121 (H) 70 - 99 mg/dL    Alkaline Phosphatase 196 (H) 40 - 150 U/L     (H) 0 - 45 U/L     (H) 0 - 50 U/L    Protein Total 5.1 (L) 6.4 - 8.3 g/dL    Albumin 2.8 (L) 3.5 - 5.2 g/dL    Bilirubin Total 1.3 (H) <=1.2 mg/dL   Glucose by meter    Collection Time: 01/17/24  8:16 AM   Result Value Ref Range    GLUCOSE BY METER POCT 111 (H) 70 - 99 mg/dL   Glucose by meter    Collection Time: 01/17/24 11:41 AM   Result Value Ref Range    GLUCOSE BY METER POCT 92 70 - 99 mg/dL     Interval History     Patient reports joint aches are a bit better today. Edema also improved. Was having skin pains attributed to edema and these are a bit better too. She thought she may have had some LUQ pain earlier but is unsure if she may have been dreaming- it is not painful currently. Did wake with a headache today, somewhat better now after a rest.    She is wondering if recent Ifluenza related to current illness. She has been noting some nasal discharge \"chunks\" and notes she had an implant placed in her left orbital floor a few months ago after an injury. We will get CT sinuses.    Not ready for discharge. Decreased rate of IVF  "

## 2024-01-17 NOTE — PLAN OF CARE
Problem: Adult Inpatient Plan of Care  Goal: Optimal Comfort and Wellbeing  Outcome: Progressing     Problem: Infection  Goal: Absence of Infection Signs and Symptoms  Outcome: Progressing     Problem: Sepsis/Septic Shock  Goal: Absence of Infection Signs and Symptoms  Outcome: Progressing     Problem: Sepsis/Septic Shock  Goal: Optimal Nutrition Intake  Outcome: Progressing   Goal Outcome Evaluation:  VSS on room air. Sae knee pain due to arthritis per patient. Pain tolerable at rest. Up stand by assist. Iv antibiotics given per orders. K, mag, and phosphorus protocol. Received iv bump of phosphorus. All electrolytes scheduled for recheck with morning lab draw. Tele: NSR.

## 2024-01-17 NOTE — PROGRESS NOTES
"[facility] Progress Note    Subjective: Patient denies any GI complaints.    Objective:  /58 (BP Location: Right arm)   Pulse 92   Temp 98.3  F (36.8  C) (Oral)   Resp 20   Ht 1.626 m (5' 4\")   Wt 113.4 kg (250 lb)   LMP 12/13/2023 (Exact Date)   SpO2 97%   BMI 42.91 kg/m     Gen: Awake, no acute distress  Gastrointestinal: soft, non-tender, non-distended, no rebound/guarding      Patient Active Problem List   Diagnosis    Cerebral venous thrombosis in puerperium    Factor 5 Leiden mutation, heterozygous (H24)    Thromboembolic disorder (H)    History of chronic urticaria    Obesity    Obstructive sleep apnea syndrome    Injury of head, initial encounter    Closed fracture of orbital floor, unspecified laterality, initial encounter (H)    Tachycardia    Acquired hypothyroidism    Attention deficit hyperactivity disorder (ADHD), predominantly inattentive type    Factor V deficiency (H)    Gastroesophageal reflux disease    Headache, unspecified    Hypomagnesemia    Major depressive disorder, recurrent, moderate (H)    Other allergic rhinitis    Personal history of pulmonary embolism    Pseudotumor cerebri    Urticaria    Vitamin D deficiency    Warfarin anticoagulation    Morbid (severe) obesity due to excess calories (H)    Sinus tachycardia    Elevated LFTs    Septic shock (H)    Sepsis (H)       Labs:  Recent Labs   Lab Test 01/16/24  0557   WBC 7.0   RBC 3.24*   HGB 9.3*   HCT 28.7*   MCV 89   MCH 28.7   MCHC 32.4   RDW 13.6   *      Sodium   Date Value Ref Range Status   01/17/2024 140 135 - 145 mmol/L Final     Comment:     Reference intervals for this test were updated on 09/26/2023 to more accurately reflect our healthy population. There may be differences in the flagging of prior results with similar values performed with this method. Interpretation of those prior results can be made in the context of the updated reference intervals.      Potassium   Date Value Ref Range " Status   01/17/2024 4.1 3.4 - 5.3 mmol/L Final   06/17/2022 4.5 3.5 - 5.0 mmol/L Final     Chloride   Date Value Ref Range Status   01/17/2024 113 (H) 98 - 107 mmol/L Final   06/17/2022 110 (H) 98 - 107 mmol/L Final     Carbon Dioxide (CO2)   Date Value Ref Range Status   01/17/2024 16 (L) 22 - 29 mmol/L Final   06/17/2022 20 (L) 22 - 31 mmol/L Final     Anion Gap   Date Value Ref Range Status   01/17/2024 11 7 - 15 mmol/L Final   06/17/2022 11 5 - 18 mmol/L Final     Glucose   Date Value Ref Range Status   01/17/2024 121 (H) 70 - 99 mg/dL Final   06/17/2022 91 70 - 125 mg/dL Final     GLUCOSE BY METER POCT   Date Value Ref Range Status   01/17/2024 111 (H) 70 - 99 mg/dL Final     Urea Nitrogen   Date Value Ref Range Status   01/17/2024 12.8 6.0 - 20.0 mg/dL Final   06/17/2022 9 8 - 22 mg/dL Final     Creatinine   Date Value Ref Range Status   01/17/2024 0.95 0.51 - 0.95 mg/dL Final     GFR Estimate   Date Value Ref Range Status   01/17/2024 72 >60 mL/min/1.73m2 Final   02/13/2021 >60 >60 mL/min/1.73m2 Final     Calcium   Date Value Ref Range Status   01/17/2024 7.1 (L) 8.6 - 10.0 mg/dL Final      Recent Labs   Lab Test 01/16/24  0557   PROTTOTAL 5.0*   ALBUMIN 2.9*   BILITOTAL 2.5*   ALKPHOS 143   *   *      Liver Function Studies -   Recent Labs   Lab Test 01/17/24  0659   PROTTOTAL 5.1*   ALBUMIN 2.8*   BILITOTAL 1.3*   ALKPHOS 196*   *   *      INR   Date Value Ref Range Status   01/17/2024 3.10 (H) 0.85 - 1.15 Final   09/01/2005 2.37 (H) 0.86 - 1.14 Final      Acute hepatitis panel negative    Image(s):      Impression: This is a 53-year-old female who presented with fevers and palpitations.  She reports that she was feeling well until she ate out at [facility] 2 days prior to presentation.  She ordered a medium steak but she reports it was relatively rare.  After dinner she developed some nausea and 1 episode of vomiting and 1 episode of nonbloody diarrhea.  She also developed  a fever to approximately 103.  She took Advil and her fever decreased to 99 or 100 but then recurred on 1/15.  She also had palpitations.  Reports she had flulike symptoms and palpitations a few weeks ago and was evaluated.  Her LFTs on 12/26/23 were normal.  Her LFTs on 1/15 showed elevation in her transaminases, alkaline phosphatase, as well as her bilirubin.  She reports that she has had intermittent right upper quadrant pain for a few months but has not had any abdominal pain recently.  She denies any abdominal pain or other GI symptoms at this time.  She had a fever to 102.9 in the ER.  She was tachycardic and mildly hypotensive but her vitals have improved.  She had a right upper quadrant ultrasound and a CT scan of the abdomen and pelvis in the ER that did not show any evidence of biliary ductal dilation and no evidence of choledocholithiasis or gallstones.  1/15 MRCP was unrevealing with no evidence of gallstones or choledocholithiasis.  Patient is overall feeling better and denies any GI complaints.  Her total bilirubin is trending down and the remainder of her LFTs are relatively stable.  Her alkaline phosphatase did increase mildly today.     Recommendation:   -Continue antibiotics for now  -Recheck LFTs tomorrow am if still in hospital - if patient is discharged, then I advised that she recheck her LFTs via her primary care provider on Friday and then monitor periodically via her primary care provider until they normalize.  If she has any persistent mild elevation in any of her LFTs then I advised she follow-up in [facility] for further evaluation.    Rita Sandra MD  1/17/2024   [facility]

## 2024-01-17 NOTE — PHARMACY-VANCOMYCIN DOSING SERVICE
Pharmacy Vancomycin Note  Date of Service 2024  Patient's  1970   53 year old, female    Indication: Sepsis  Day of Therapy: 3  Current vancomycin regimen:  1000 mg IV q24h  Current vancomycin monitoring method: AUC  Current vancomycin therapeutic monitoring goal: 400-600 mg*h/L    [employer] Prediction of Current Vancomycin Regimen    Loading dose: N/A  Regimen: 1000 mg IV every 24 hours.  Start time: 14:36 on 2024  Exposure target: AUC24 (range)400-600 mg/L.hr   AUC24,ss: 310 mg/L.hr  Probability of AUC24 > 400: 32 %  Ctrough,ss: 6.9 mg/L  Probability of Ctrough,ss > 20: 12 %  Probability of nephrotoxicity (Lodise ALEX ): 4 %      Current estimated CrCl = Estimated Creatinine Clearance: 85.5 mL/min (based on SCr of 0.95 mg/dL).    Creatinine for last 3 days  1/15/2024: 10:34 AM Creatinine 1.39 mg/dL  2024:  5:57 AM Creatinine 1.21 mg/dL  2024:  6:59 AM Creatinine 0.95 mg/dL    Recent Vancomycin Levels (past 3 days)  No results found for requested labs within last 3 days.    Vancomycin IV Administrations (past 72 hours)                     vancomycin (VANCOCIN) 1,000 mg in 200 mL dextrose intermittent infusion (mg) 1,000 mg New Bag 24 1247    vancomycin (VANCOCIN) 2,500 mg in sodium chloride 0.9 % 500 mL intermittent infusion (mg) 2,500 mg New Bag 01/15/24 1249                    Nephrotoxins and other renal medications (From now, onward)      Start     Dose/Rate Route Frequency Ordered Stop    24 1200  vancomycin (VANCOCIN) 1,500 mg in sodium chloride 0.9 % 250 mL intermittent infusion         1,500 mg  over 90 Minutes Intravenous EVERY 24 HOURS 24 1434      01/15/24 1730  piperacillin-tazobactam (ZOSYN) 3.375 g vial to attach to  mL bag         3.375 g  over 240 Minutes Intravenous EVERY 8 HOURS 01/15/24 1511                 Contrast Orders - past 72 hours (72h ago, onward)      Start     Dose/Rate Route Frequency Stop    24 1400  perflutren lipid  microsphere (DEFINITY) injection SUSP          Intravenous ONCE 01/16/24 1354    01/16/24 0830  gadobutrol (GADAVIST) injection 11 mL         11 mL Intravenous ONCE 01/16/24 0824    01/15/24 1400  iopamidol (ISOVUE-370) solution 75 mL         75 mL Intravenous ONCE 01/15/24 1351            Interpretation of levels and current regimen:  No vancomycin level obtained, empirically adjusting based on renal function and kinetic model predictions    Has serum creatinine changed greater than 50% in last 72 hours: No    Urine output:  good urine output    Renal Function: Improving    [employer] Prediction of Planned New Vancomycin Regimen    Loading dose: N/A  Regimen: 1500 mg IV every 24 hours.  Start time: 14:36 on 01/17/2024  Exposure target: AUC24 (range)400-600 mg/L.hr   AUC24,ss: 462 mg/L.hr  Probability of AUC24 > 400: 61 %  Ctrough,ss: 10.5 mg/L  Probability of Ctrough,ss > 20: 24 %  Probability of nephrotoxicity (Lodise ALEX 2009): 6 %      Plan:  Increase Dose to 1500 mg q24 hrs  Vancomycin monitoring method: AUC  Vancomycin therapeutic monitoring goal: 400-600 mg*h/L  Pharmacy will check vancomycin levels as appropriate in 1-3 Days.  Serum creatinine levels will be ordered daily for the first week of therapy and at least twice weekly for subsequent weeks.    Mala Garcia, [facility]

## 2024-01-18 ENCOUNTER — APPOINTMENT (OUTPATIENT)
Dept: ULTRASOUND IMAGING | Facility: HOSPITAL | Age: 54
DRG: 871 | End: 2024-01-18
Attending: INTERNAL MEDICINE
Payer: COMMERCIAL

## 2024-01-18 LAB
ALBUMIN SERPL BCG-MCNC: 2.9 G/DL (ref 3.5–5.2)
ALP SERPL-CCNC: 252 U/L (ref 40–150)
ALT SERPL W P-5'-P-CCNC: 163 U/L (ref 0–50)
ANION GAP SERPL CALCULATED.3IONS-SCNC: 9 MMOL/L (ref 7–15)
AST SERPL W P-5'-P-CCNC: 123 U/L (ref 0–45)
BILIRUB DIRECT SERPL-MCNC: 0.52 MG/DL (ref 0–0.3)
BILIRUB SERPL-MCNC: 0.9 MG/DL
BUN SERPL-MCNC: 9.3 MG/DL (ref 6–20)
CALCIUM SERPL-MCNC: 7.6 MG/DL (ref 8.6–10)
CHLORIDE SERPL-SCNC: 115 MMOL/L (ref 98–107)
CREAT SERPL-MCNC: 0.84 MG/DL (ref 0.51–0.95)
DEPRECATED HCO3 PLAS-SCNC: 15 MMOL/L (ref 22–29)
EGFRCR SERPLBLD CKD-EPI 2021: 83 ML/MIN/1.73M2
ERYTHROCYTE [DISTWIDTH] IN BLOOD BY AUTOMATED COUNT: 14.6 % (ref 10–15)
GLUCOSE BLDC GLUCOMTR-MCNC: 100 MG/DL (ref 70–99)
GLUCOSE BLDC GLUCOMTR-MCNC: 96 MG/DL (ref 70–99)
GLUCOSE SERPL-MCNC: 98 MG/DL (ref 70–99)
HCT VFR BLD AUTO: 31.3 % (ref 35–47)
HGB BLD-MCNC: 9.9 G/DL (ref 11.7–15.7)
INR PPP: 4.88 (ref 0.85–1.15)
MAGNESIUM SERPL-MCNC: 2.1 MG/DL (ref 1.7–2.3)
MCH RBC QN AUTO: 28.7 PG (ref 26.5–33)
MCHC RBC AUTO-ENTMCNC: 31.6 G/DL (ref 31.5–36.5)
MCV RBC AUTO: 91 FL (ref 78–100)
PHOSPHATE SERPL-MCNC: 2.7 MG/DL (ref 2.5–4.5)
PLATELET # BLD AUTO: 174 10E3/UL (ref 150–450)
POTASSIUM SERPL-SCNC: 4.3 MMOL/L (ref 3.4–5.3)
PROT SERPL-MCNC: 5.5 G/DL (ref 6.4–8.3)
RBC # BLD AUTO: 3.45 10E6/UL (ref 3.8–5.2)
SODIUM SERPL-SCNC: 139 MMOL/L (ref 135–145)
WBC # BLD AUTO: 7.1 10E3/UL (ref 4–11)

## 2024-01-18 PROCEDURE — 120N000001 HC R&B MED SURG/OB

## 2024-01-18 PROCEDURE — 84100 ASSAY OF PHOSPHORUS: CPT | Performed by: HOSPITALIST

## 2024-01-18 PROCEDURE — 250N000011 HC RX IP 250 OP 636: Performed by: STUDENT IN AN ORGANIZED HEALTH CARE EDUCATION/TRAINING PROGRAM

## 2024-01-18 PROCEDURE — 99232 SBSQ HOSP IP/OBS MODERATE 35: CPT | Performed by: HOSPITALIST

## 2024-01-18 PROCEDURE — 80053 COMPREHEN METABOLIC PANEL: CPT | Performed by: INTERNAL MEDICINE

## 2024-01-18 PROCEDURE — 36415 COLL VENOUS BLD VENIPUNCTURE: CPT | Performed by: HOSPITALIST

## 2024-01-18 PROCEDURE — 93976 VASCULAR STUDY: CPT

## 2024-01-18 PROCEDURE — 250N000013 HC RX MED GY IP 250 OP 250 PS 637: Performed by: STUDENT IN AN ORGANIZED HEALTH CARE EDUCATION/TRAINING PROGRAM

## 2024-01-18 PROCEDURE — 85027 COMPLETE CBC AUTOMATED: CPT | Performed by: HOSPITALIST

## 2024-01-18 PROCEDURE — 250N000013 HC RX MED GY IP 250 OP 250 PS 637: Performed by: HOSPITALIST

## 2024-01-18 PROCEDURE — 85610 PROTHROMBIN TIME: CPT | Performed by: STUDENT IN AN ORGANIZED HEALTH CARE EDUCATION/TRAINING PROGRAM

## 2024-01-18 PROCEDURE — 83735 ASSAY OF MAGNESIUM: CPT | Performed by: HOSPITALIST

## 2024-01-18 RX ORDER — PANTOPRAZOLE SODIUM 40 MG/1
40 TABLET, DELAYED RELEASE ORAL
Status: DISCONTINUED | OUTPATIENT
Start: 2024-01-18 | End: 2024-01-19 | Stop reason: HOSPADM

## 2024-01-18 RX ADMIN — THYROID, PORCINE 180 MG: 60 TABLET ORAL at 21:11

## 2024-01-18 RX ADMIN — PIPERACILLIN AND TAZOBACTAM 3.38 G: 3; .375 INJECTION, POWDER, FOR SOLUTION INTRAVENOUS at 01:57

## 2024-01-18 RX ADMIN — AMOXICILLIN AND CLAVULANATE POTASSIUM 1 TABLET: 875; 125 TABLET, FILM COATED ORAL at 19:42

## 2024-01-18 RX ADMIN — ACETAZOLAMIDE 500 MG: 250 TABLET ORAL at 21:11

## 2024-01-18 RX ADMIN — AMOXICILLIN AND CLAVULANATE POTASSIUM 1 TABLET: 875; 125 TABLET, FILM COATED ORAL at 08:52

## 2024-01-18 RX ADMIN — POTASSIUM & SODIUM PHOSPHATES POWDER PACK 280-160-250 MG 1 PACKET: 280-160-250 PACK at 10:54

## 2024-01-18 RX ADMIN — POTASSIUM & SODIUM PHOSPHATES POWDER PACK 280-160-250 MG 1 PACKET: 280-160-250 PACK at 15:00

## 2024-01-18 RX ADMIN — PANTOPRAZOLE SODIUM 40 MG: 40 TABLET, DELAYED RELEASE ORAL at 08:51

## 2024-01-18 ASSESSMENT — ACTIVITIES OF DAILY LIVING (ADL)
ADLS_ACUITY_SCORE: 37
ADLS_ACUITY_SCORE: 38
ADLS_ACUITY_SCORE: 22
ADLS_ACUITY_SCORE: 37
ADLS_ACUITY_SCORE: 39
ADLS_ACUITY_SCORE: 22
ADLS_ACUITY_SCORE: 37
ADLS_ACUITY_SCORE: 22
ADLS_ACUITY_SCORE: 39
ADLS_ACUITY_SCORE: 22
ADLS_ACUITY_SCORE: 38
ADLS_ACUITY_SCORE: 39

## 2024-01-18 NOTE — PLAN OF CARE
Goal Outcome Evaluation:    Problem: Adult Inpatient Plan of Care  Goal: Absence of Hospital-Acquired Illness or Injury  Outcome: Progressing  Intervention: Prevent Infection  Recent Flowsheet Documentation  Taken 1/18/2024 [id number] by Bernadine Robles RN  Infection Prevention: hand hygiene promoted    Problem: Adult Inpatient Plan of Care  Goal: Optimal Comfort and Wellbeing  Outcome: Progressing    Problem: Sepsis/Septic Shock  Goal: Optimal Nutrition Intake  Outcome: Progressing     Pt denied pain during this shift.   Independent in room with ambulation.  No safety incidents during this shift.   Phosphorus replacement given per protocol; recheck due tomorrow.  Pt had 1 small loose BM.

## 2024-01-18 NOTE — PROGRESS NOTES
SPIRITUAL HEALTH SERVICES Note     Saw pt Anna Nieves and administered Hoahaoism sacrament of anointing for the healing of the sick.    Fr. Dave Rosa

## 2024-01-18 NOTE — PROGRESS NOTES
[facility]    Medicine Progress Note - Hospitalist Service    Date of Admission:  1/15/2024    Assessment & Plan                Anna Nieves is a 53 year old female with history of factor V deficiency, GERD, hypothyroidism, pulmonary embolism, RENETTA, previous sagittal sinus thrombosis/DVT/PE, on anticoagulation who presented for evaluation of high heart rate and fever.  Admitted with acute hepatitis and sepsis attributed to UTI +/- sinusitis. Hospital Day: 4         Severe sepsis, unclear source, possibly urinary or sinus  - Patient presented with fever, tachycardia, hypotension and lactic acidosis. Denies urinary symptoms. Recent influenza A 2 weeks ago. No current lower respiratory symptoms. Does endorse body aches.  -Procalcitonin 7.4-->8.3-->4.6  - Has improved with aggressive IV hydration and broad spectrum antibiotics.  Today we will de-escalate antibiotics from Zosyn to Augmentin  - CT abdomen is negative for pyelonephritis  - UA +LE and blood, some squames, neg nitrite. urine culture with mixed nida, but there was bladder wall thickening on CT  -blood culture no growth  - echo unremarkable  -Not entirely convinced that UTI accounts for her sepsis.  -does endorse history of orbital floor implant last May and current sinus pressure, nasal discharge and debris passage. Questioning implant infection or osteomyelitis could be playing a role, could have been provoked by recent Influenza illness. CT sinus fairly unremarkable, does mention some sinus opacification and mucosal thickening. Possibly sinusitis. Would prescribe a bit longer course of antibiotics at discharge and have her follow up with ENT outpatient soon.  -Monitor with transition to oral antibiotics and possibly discharge home tomorrow if she continues to improve     Acute hepatitis  - Etiology is unclear  - Patient developed nausea and vomiting with fever after eating steak dinner. However also has had some subacute  "symptoms. Chronicity does not seem to support acute foodborne illness. No longer having any GI symptoms.  - CT abdomen shows hepatic steatosis and no evidence of pyelonephritis  - Abdominal ultrasound is positive for hepatic steatosis with no gallstones.  - Negative acute hepatitis panel  - GI saw, appreciate input  - MRCP is negative for choledocholithiasis or biliary dilatation  -possibly shock liver or elevated due to FONTAINE  - Continue to monitor LFTs. Would have her recheck in a few days after discharge as well.  If remains elevated, then she should establish with the liver clinic as outpatient     Acute kidney injury  - Probably prerenal secondary to dehydration  -Resolved with IV fluid support.  Can discontinue IV fluid now  - Avoid nephrotoxic drugs  - Continue to monitor renal function     History of factor V deficiency with DVT/PE  - Pharmacy to dose warfarin     Hypokalemia, hypomagnesemia and hypophosphatemia  - Replace per protocol     Hypothyroidism  -Continue home Synthroid     GERD  -Continue PPI  -GI recommending outpatient EGD for her longstanding intermittent abdominal symptoms     History of sleep apnea  - Continue with CPAP    Pseudotumor cerebri  -Continue home Diamox  -Has been getting some headaches here, she states this is not uncommon for her when she is otherwise ill, does not take much for her to get a headache    Obesity  -BMI 42       DVT Prophylaxis: High risk. Coumadin  Diet: Combination Diet Regular Diet Adult    Martin Catheter: Not present  Lines: None     Cardiac Monitoring: None  Code Status: Full Code      Clinically Significant Risk Factors              # Hypoalbuminemia: Lowest albumin = 2.8 g/dL at 1/17/2024  6:59 AM, will monitor as appropriate            # Severe Obesity: Estimated body mass index is 42.91 kg/m  as calculated from the following:    Height as of this encounter: 1.626 m (5' 4\").    Weight as of this encounter: 113.4 kg (250 lb)., PRESENT ON ADMISSION       # " Financial/Environmental Concerns: none         Disposition Plan   Disposition: Home     Expected Discharge Date: 01/19/2024        Discharge Comments: ADAT, pending improvement of labs     Medically ready to discharge today: No     The patient's care was discussed with the Patient.    Kelsey Hood MD  Hospitalist Service  [facility]  Securely message with [employer] (more info)  Text page via [employer] Paging/Directory   ______________________________________________________________________      Physical Exam   Vital Signs: Temp: 98  F (36.7  C) Temp src: Oral BP: 121/67 Pulse: 89   Resp: 16 SpO2: 98 % O2 Device: None (Room air)    Weight: 250 lbs 0 oz  General: in no apparent distress, non-toxic, and alert female lying in hospital bed oriented x3.  Seems more bright and alert today  HEENT: Head normocephalic atraumatic, oral mucosa moist. Sclerae anicteric  Skin: No rashes or lesions  Extremities: 1+ pitting edema bilateral ankles and hands  Psych: Normal affect, mood dysthymic  Neuro: Grossly normal        Medical Decision Making               Data   Recent Results (from the past 12 hour(s))   INR    Collection Time: 01/18/24  6:50 AM   Result Value Ref Range    INR 4.88 (H) 0.85 - 1.15   Magnesium    Collection Time: 01/18/24  6:50 AM   Result Value Ref Range    Magnesium 2.1 1.7 - 2.3 mg/dL   Phosphorus    Collection Time: 01/18/24  6:50 AM   Result Value Ref Range    Phosphorus 2.7 2.5 - 4.5 mg/dL   Hepatic panel    Collection Time: 01/18/24  6:50 AM   Result Value Ref Range    Protein Total 5.5 (L) 6.4 - 8.3 g/dL    Albumin 2.9 (L) 3.5 - 5.2 g/dL    Bilirubin Total 0.9 <=1.2 mg/dL    Alkaline Phosphatase 252 (H) 40 - 150 U/L     (H) 0 - 45 U/L     (H) 0 - 50 U/L    Bilirubin Direct 0.52 (H) 0.00 - 0.30 mg/dL   CBC with platelets    Collection Time: 01/18/24  6:50 AM   Result Value Ref Range    WBC Count 7.1 4.0 - 11.0 10e3/uL    RBC Count 3.45 (L) 3.80 - 5.20 10e6/uL     Hemoglobin 9.9 (L) 11.7 - 15.7 g/dL    Hematocrit 31.3 (L) 35.0 - 47.0 %    MCV 91 78 - 100 fL    MCH 28.7 26.5 - 33.0 pg    MCHC 31.6 31.5 - 36.5 g/dL    RDW 14.6 10.0 - 15.0 %    Platelet Count 174 150 - 450 10e3/uL   Basic metabolic panel    Collection Time: 01/18/24  6:50 AM   Result Value Ref Range    Sodium 139 135 - 145 mmol/L    Potassium 4.3 3.4 - 5.3 mmol/L    Chloride 115 (H) 98 - 107 mmol/L    Carbon Dioxide (CO2) 15 (L) 22 - 29 mmol/L    Anion Gap 9 7 - 15 mmol/L    Urea Nitrogen 9.3 6.0 - 20.0 mg/dL    Creatinine 0.84 0.51 - 0.95 mg/dL    GFR Estimate 83 >60 mL/min/1.73m2    Calcium 7.6 (L) 8.6 - 10.0 mg/dL    Glucose 98 70 - 99 mg/dL     Interval History     Patient seems to be doing a lot better today.  States joint pains are getting a little bit better each day.  Continues with headaches, but this is not a new issue for her.  She had a little bit of stomach upset yesterday after eating spicy breakfast taco with sausage.  This is not a new issue for her either.  She is feeling better now.  We discussed plan for the day, she would prefer to be monitored 1 more day.  We will go ahead and transition to oral antibiotics.  If she continues to do well, she will discharge tomorrow.

## 2024-01-18 NOTE — PLAN OF CARE
Problem: Adult Inpatient Plan of Care  Goal: Plan of Care Review  Description: The Plan of Care Review/Shift note should be completed every shift.  The Outcome Evaluation is a brief statement about your assessment that the patient is improving, declining, or no change.  This information will be displayed automatically on your shift  note.  Outcome: Progressing   Goal Outcome Evaluation:    Pt is eating and drinking well. No nausea or vomiting. Complained of headache and head pressure. PRN Tylenol 350mg given as well as PRN Diamox 250mg for head pressure. Vancomycin discontinued. Head CT completed. Will continue to monitor.

## 2024-01-18 NOTE — PROGRESS NOTES
"[facility] Progress Note    Subjective: Patient denies any current GI complaints.    Objective:  /58 (BP Location: Right arm)   Pulse 92   Temp 98.3  F (36.8  C) (Oral)   Resp 20   Ht 1.626 m (5' 4\")   Wt 113.4 kg (250 lb)   LMP 12/13/2023 (Exact Date)   SpO2 97%   BMI 42.91 kg/m     Gen: Awake, no acute distress  Gastrointestinal: soft, non-tender, non-distended, no rebound/guarding      Patient Active Problem List   Diagnosis    Cerebral venous thrombosis in puerperium    Factor 5 Leiden mutation, heterozygous (H24)    Thromboembolic disorder (H)    History of chronic urticaria    Obesity    Obstructive sleep apnea syndrome    Injury of head, initial encounter    Closed fracture of orbital floor, unspecified laterality, initial encounter (H)    Tachycardia    Acquired hypothyroidism    Attention deficit hyperactivity disorder (ADHD), predominantly inattentive type    Factor V deficiency (H)    Gastroesophageal reflux disease    Headache, unspecified    Hypomagnesemia    Major depressive disorder, recurrent, moderate (H)    Other allergic rhinitis    Personal history of pulmonary embolism    Pseudotumor cerebri    Urticaria    Vitamin D deficiency    Warfarin anticoagulation    Morbid (severe) obesity due to excess calories (H)    Sinus tachycardia    Elevated LFTs    Septic shock (H)    Sepsis (H)       Labs:  Recent Labs   Lab Test 01/16/24  0557   WBC 7.0   RBC 3.24*   HGB 9.3*   HCT 28.7*   MCV 89   MCH 28.7   MCHC 32.4   RDW 13.6   *      Sodium   Date Value Ref Range Status   01/17/2024 140 135 - 145 mmol/L Final     Comment:     Reference intervals for this test were updated on 09/26/2023 to more accurately reflect our healthy population. There may be differences in the flagging of prior results with similar values performed with this method. Interpretation of those prior results can be made in the context of the updated reference intervals.      Potassium   Date Value Ref " Range Status   01/17/2024 4.1 3.4 - 5.3 mmol/L Final   06/17/2022 4.5 3.5 - 5.0 mmol/L Final     Chloride   Date Value Ref Range Status   01/17/2024 113 (H) 98 - 107 mmol/L Final   06/17/2022 110 (H) 98 - 107 mmol/L Final     Carbon Dioxide (CO2)   Date Value Ref Range Status   01/17/2024 16 (L) 22 - 29 mmol/L Final   06/17/2022 20 (L) 22 - 31 mmol/L Final     Anion Gap   Date Value Ref Range Status   01/17/2024 11 7 - 15 mmol/L Final   06/17/2022 11 5 - 18 mmol/L Final     Glucose   Date Value Ref Range Status   01/17/2024 121 (H) 70 - 99 mg/dL Final   06/17/2022 91 70 - 125 mg/dL Final     GLUCOSE BY METER POCT   Date Value Ref Range Status   01/17/2024 111 (H) 70 - 99 mg/dL Final     Urea Nitrogen   Date Value Ref Range Status   01/17/2024 12.8 6.0 - 20.0 mg/dL Final   06/17/2022 9 8 - 22 mg/dL Final     Creatinine   Date Value Ref Range Status   01/17/2024 0.95 0.51 - 0.95 mg/dL Final     GFR Estimate   Date Value Ref Range Status   01/17/2024 72 >60 mL/min/1.73m2 Final   02/13/2021 >60 >60 mL/min/1.73m2 Final     Calcium   Date Value Ref Range Status   01/17/2024 7.1 (L) 8.6 - 10.0 mg/dL Final      Recent Labs   Lab Test 01/16/24  0557   PROTTOTAL 5.0*   ALBUMIN 2.9*   BILITOTAL 2.5*   ALKPHOS 143   *   *      Liver Function Studies -   Recent Labs   Lab Test 01/17/24  0659   PROTTOTAL 5.1*   ALBUMIN 2.8*   BILITOTAL 1.3*   ALKPHOS 196*   *   *      Liver Function Studies -   Recent Labs   Lab Test 01/18/24  0650   PROTTOTAL 5.5*   ALBUMIN 2.9*   BILITOTAL 0.9   ALKPHOS 252*   *   *      INR   Date Value Ref Range Status   01/17/2024 3.10 (H) 0.85 - 1.15 Final   09/01/2005 2.37 (H) 0.86 - 1.14 Final      Acute hepatitis panel negative    Image(s):      Impression: This is a 53-year-old female who presented with fevers and palpitations.  She reports that she was feeling well until she ate out at [employer] 2 days prior to presentation.  She ordered a medium steak  but she reports it was relatively rare.  After dinner she developed some nausea and 1 episode of vomiting and 1 episode of nonbloody diarrhea.  She also developed a fever to approximately 103.  She took Advil and her fever decreased to 99 or 100 but then recurred on 1/15.  She also had palpitations.  Reports she had flulike symptoms and palpitations a few weeks ago and was evaluated.  Her LFTs on 12/26/23 were normal.  Her LFTs on 1/15 showed elevation in her transaminases, alkaline phosphatase, as well as her bilirubin.  She reports that she has had intermittent right upper quadrant pain for a few months but has not had any abdominal pain recently.  She denies any abdominal pain or other GI symptoms at this time.  She had a fever to 102.9 in the ER.  She was tachycardic and mildly hypotensive but her vitals have improved.  She had a right upper quadrant ultrasound and a CT scan of the abdomen and pelvis in the ER that did not show any evidence of biliary ductal dilation and no evidence of choledocholithiasis or gallstones.  1/15 MRCP was unrevealing with no evidence of gallstones or choledocholithiasis.  Patient is overall feeling better and denies any GI complaints.  Patient's total bili bilirubin has normalized.  Her AST is trending down.  Her ALT is stable as compared to yesterday.  Her alk phosphatase increased mildly today.     Recommendation:   -Liver Doppler ultrasound today for further evaluation, although likely to be normal  -Recheck LFTs tomorrow am if still in hospital - if patient is discharged, then I advised that she recheck her LFTs via her primary care provider next week and then monitor periodically via her primary care provider until they normalize.  If she has any persistent mild elevation in any of her LFTs then I advised she follow-up in [facility] for further evaluation.  -We will schedule patient for an outpatient EGD for further evaluation of her intermittent upper abdominal pain  (not present this admission).  Will also schedule her for an outpatient colonoscopy at the same time for colorectal cancer screening.    Rita Sandra MD  1/18/2024   [facility]

## 2024-01-19 VITALS
OXYGEN SATURATION: 96 % | TEMPERATURE: 98.2 F | RESPIRATION RATE: 18 BRPM | HEIGHT: 64 IN | DIASTOLIC BLOOD PRESSURE: 68 MMHG | WEIGHT: 250 LBS | SYSTOLIC BLOOD PRESSURE: 124 MMHG | HEART RATE: 77 BPM | BODY MASS INDEX: 42.68 KG/M2

## 2024-01-19 LAB
ALBUMIN SERPL BCG-MCNC: 3.3 G/DL (ref 3.5–5.2)
ALP SERPL-CCNC: 268 U/L (ref 40–150)
ALT SERPL W P-5'-P-CCNC: 150 U/L (ref 0–50)
ANION GAP SERPL CALCULATED.3IONS-SCNC: 7 MMOL/L (ref 7–15)
AST SERPL W P-5'-P-CCNC: 92 U/L (ref 0–45)
BILIRUB SERPL-MCNC: 0.8 MG/DL
BUN SERPL-MCNC: 9.4 MG/DL (ref 6–20)
CALCIUM SERPL-MCNC: 8.3 MG/DL (ref 8.6–10)
CHLORIDE SERPL-SCNC: 113 MMOL/L (ref 98–107)
CREAT SERPL-MCNC: 0.88 MG/DL (ref 0.51–0.95)
DEPRECATED HCO3 PLAS-SCNC: 20 MMOL/L (ref 22–29)
EGFRCR SERPLBLD CKD-EPI 2021: 79 ML/MIN/1.73M2
ERYTHROCYTE [DISTWIDTH] IN BLOOD BY AUTOMATED COUNT: 14.7 % (ref 10–15)
GLUCOSE BLDC GLUCOMTR-MCNC: 90 MG/DL (ref 70–99)
GLUCOSE SERPL-MCNC: 100 MG/DL (ref 70–99)
HCT VFR BLD AUTO: 32 % (ref 35–47)
HGB BLD-MCNC: 10 G/DL (ref 11.7–15.7)
INR PPP: 4.08 (ref 0.85–1.15)
MAGNESIUM SERPL-MCNC: 1.9 MG/DL (ref 1.7–2.3)
MCH RBC QN AUTO: 28.5 PG (ref 26.5–33)
MCHC RBC AUTO-ENTMCNC: 31.3 G/DL (ref 31.5–36.5)
MCV RBC AUTO: 91 FL (ref 78–100)
PHOSPHATE SERPL-MCNC: 3.6 MG/DL (ref 2.5–4.5)
PLATELET # BLD AUTO: 180 10E3/UL (ref 150–450)
POTASSIUM SERPL-SCNC: 4.2 MMOL/L (ref 3.4–5.3)
PROT SERPL-MCNC: 5.8 G/DL (ref 6.4–8.3)
RBC # BLD AUTO: 3.51 10E6/UL (ref 3.8–5.2)
SODIUM SERPL-SCNC: 140 MMOL/L (ref 135–145)
WBC # BLD AUTO: 7.2 10E3/UL (ref 4–11)

## 2024-01-19 PROCEDURE — 83735 ASSAY OF MAGNESIUM: CPT | Performed by: HOSPITALIST

## 2024-01-19 PROCEDURE — 80053 COMPREHEN METABOLIC PANEL: CPT | Performed by: HOSPITALIST

## 2024-01-19 PROCEDURE — 84100 ASSAY OF PHOSPHORUS: CPT | Performed by: HOSPITALIST

## 2024-01-19 PROCEDURE — 85610 PROTHROMBIN TIME: CPT | Performed by: STUDENT IN AN ORGANIZED HEALTH CARE EDUCATION/TRAINING PROGRAM

## 2024-01-19 PROCEDURE — 85027 COMPLETE CBC AUTOMATED: CPT | Performed by: HOSPITALIST

## 2024-01-19 PROCEDURE — 36415 COLL VENOUS BLD VENIPUNCTURE: CPT | Performed by: STUDENT IN AN ORGANIZED HEALTH CARE EDUCATION/TRAINING PROGRAM

## 2024-01-19 PROCEDURE — 250N000013 HC RX MED GY IP 250 OP 250 PS 637: Performed by: HOSPITALIST

## 2024-01-19 PROCEDURE — 99239 HOSP IP/OBS DSCHRG MGMT >30: CPT | Performed by: HOSPITALIST

## 2024-01-19 RX ORDER — MAGNESIUM OXIDE 400 MG/1
400 TABLET ORAL EVERY 4 HOURS
Status: DISCONTINUED | OUTPATIENT
Start: 2024-01-19 | End: 2024-01-19 | Stop reason: HOSPADM

## 2024-01-19 RX ORDER — WARFARIN SODIUM 3 MG/1
3-4.5 TABLET ORAL AT BEDTIME
COMMUNITY
Start: 2024-01-19

## 2024-01-19 RX ORDER — FLUCONAZOLE 150 MG/1
TABLET ORAL
Qty: 2 TABLET | Refills: 0 | Status: SHIPPED | OUTPATIENT
Start: 2024-01-19 | End: 2024-10-07

## 2024-01-19 RX ADMIN — AMOXICILLIN AND CLAVULANATE POTASSIUM 1 TABLET: 875; 125 TABLET, FILM COATED ORAL at 07:59

## 2024-01-19 RX ADMIN — MAGNESIUM OXIDE TAB 400 MG (241.3 MG ELEMENTAL MG) 400 MG: 400 (241.3 MG) TAB at 07:59

## 2024-01-19 RX ADMIN — PANTOPRAZOLE SODIUM 40 MG: 40 TABLET, DELAYED RELEASE ORAL at 08:00

## 2024-01-19 ASSESSMENT — ACTIVITIES OF DAILY LIVING (ADL)
ADLS_ACUITY_SCORE: 22

## 2024-01-19 NOTE — PLAN OF CARE
"  Problem: Adult Inpatient Plan of Care  Goal: Plan of Care Review  Description: The Plan of Care Review/Shift note should be completed every shift.  The Outcome Evaluation is a brief statement about your assessment that the patient is improving, declining, or no change.  This information will be displayed automatically on your shift  note.  Outcome: Progressing  Flowsheets (Taken 1/19/2024 1020)  Plan of Care Reviewed With: patient  Goal: Patient-Specific Goal (Individualized)  Description: You can add care plan individualizations to a care plan. Examples of Individualization might be:  \"Parent requests to be called daily at 9am for status\", \"I have a hard time hearing out of my right ear\", or \"Do not touch me to wake me up as it startles  me\".  Outcome: Progressing  Goal: Absence of Hospital-Acquired Illness or Injury  Outcome: Progressing  Intervention: Prevent Skin Injury  Recent Flowsheet Documentation  Taken 1/19/2024 0800 by Foster Chance RN  Body Position: position changed independently  Goal: Optimal Comfort and Wellbeing  Outcome: Progressing  Goal: Readiness for Transition of Care  Outcome: Progressing     Problem: Infection  Goal: Absence of Infection Signs and Symptoms  Outcome: Progressing     Problem: Sepsis/Septic Shock  Goal: Optimal Coping  Outcome: Progressing  Goal: Absence of Bleeding  Outcome: Progressing  Goal: Blood Glucose Level Within Targeted Range  Outcome: Progressing  Goal: Absence of Infection Signs and Symptoms  Outcome: Progressing  Goal: Optimal Nutrition Intake  Outcome: Progressing   Goal Outcome Evaluation:      Plan of Care Reviewed With: patient           Pt is alert and oriented x4. Pt is med complaint. Pt's v/s is stable. Pt denies pain. Pt received all of her discharge paperwork and belongings. Pt left the unit at 1120.      "

## 2024-01-19 NOTE — PLAN OF CARE
Problem: Adult Inpatient Plan of Care  Goal: Absence of Hospital-Acquired Illness or Injury  Outcome: Progressing  Intervention: Identify and Manage Fall Risk  Recent Flowsheet Documentation  Taken 1/18/2024 1600 by Flower Latham RN  Safety Promotion/Fall Prevention:   assistive device/personal items within reach   clutter free environment maintained   nonskid shoes/slippers when out of bed   patient and family education  Intervention: Prevent and Manage VTE (Venous Thromboembolism) Risk  Recent Flowsheet Documentation  Taken 1/18/2024 1600 by Flower Latham, RN  VTE Prevention/Management: SCDs (sequential compression devices) off     Problem: Adult Inpatient Plan of Care  Goal: Optimal Comfort and Wellbeing  Outcome: Progressing     Problem: Sepsis/Septic Shock  Goal: Blood Glucose Level Within Targeted Range  Outcome: Progressing   Goal Outcome Evaluation:    - VSS  -  at dinner, BG 96 at bedtime.  - Visiting with spouse and 2 children    Flower Latham, RN

## 2024-01-19 NOTE — DISCHARGE SUMMARY
[facility] MEDICINE  DISCHARGE SUMMARY     Primary Care Physician: Itzel Young  Admission Date: 1/15/2024   Discharge Provider: Kelsey Hood MD Discharge Date: 1/19/2024   Diet:   Active Diet and Nourishment Order   Procedures    Combination Diet Regular Diet Adult    Diet       Code Status: Full Code   Activity: DCACTIVITY: Activity as tolerated        Condition at Discharge: Good     REASON FOR PRESENTATION(See Admission Note for Details)   Fever, tachycardia    PRINCIPAL & ACTIVE DISCHARGE DIAGNOSES     Principal Problem:    Sinus tachycardia  Active Problems:    Factor 5 Leiden mutation, heterozygous (H24)    Acquired hypothyroidism    Personal history of pulmonary embolism    Pseudotumor cerebri    Elevated LFTs    Septic shock (H)    Sepsis (H)    PAIGE (acute kidney injury) (H24)      PENDING LABS     Unresulted Labs Ordered in the Past 30 Days of this Admission       Date and Time Order Name Status Description    1/15/2024 10:46 AM Blood Culture Peripheral Blood Preliminary     1/15/2024 10:46 AM Blood Culture Peripheral Blood Preliminary             PROCEDURES ( this hospitalization only)      none    RECOMMENDATIONS TO OUTPATIENT PROVIDER FOR F/U VISIT     Follow-up Appointments     Follow-up and recommended labs and tests       Follow up with primary care provider, Itzel Young, within 7 days for   hospital follow- up.  The following labs/tests are recommended: hepatic   profile, basic metabolic profile, complete blood count, INR in 3 days.  If liver enzymes do not go back down to normal eventually, then you should   see GI in the clinic for further evaluation.  Follow up with your plastic surgeon.            DISPOSITION     Home    SUMMARY OF HOSPITAL COURSE:      Anna Nieves is a 53 year old female with history of factor V deficiency, GERD, hypothyroidism, pulmonary embolism, RENETTA, previous sagittal sinus thrombosis/DVT/PE, on anticoagulation who presented  Health  Wellness Visit Note    Name: Laine Ty  Clinic Number: [id number]  Physician: Candelaria Darby, *  Laine completed therapeutic stretches ([place] [facility] in February.    Health  : Jocelyn Petersen  1/30/2017     for evaluation of high heart rate and fever.  Admitted with acute hepatitis and sepsis attributed to UTI +/- sinusitis. Hospital Day: 5        Severe sepsis, unclear source, possibly urinary or sinus  - Patient presented with fever, tachycardia, hypotension and lactic acidosis. Denies urinary symptoms. Recent influenza A 2 weeks ago. No current lower respiratory symptoms. Does endorse body aches.  - CT abdomen is negative for pyelonephritis  - UA +LE and blood, some squames, neg nitrite. urine culture with mixed nida, but there was bladder wall thickening on CT  -blood culture no growth  - echo unremarkable  -Not entirely convinced that UTI accounts for her sepsis.  -does endorse history of orbital floor implant last May and current sinus pressure, nasal discharge and debris passage. Questioning implant infection or osteomyelitis could be playing a role, could have been provoked by recent Influenza illness. CT sinus fairly unremarkable, does mention some sinus opacification and mucosal thickening. Possibly sinusitis. Would prescribe a bit longer course of antibiotics at discharge and have her follow up with ENT outpatient soon.  -Procalcitonin 7.4-->8.3-->4.6  - Has improved with aggressive IV hydration and broad spectrum antibiotics.  1/18 de-escalated antibiotics from Zosyn to Augmentin. Continues to do ok.  -Plan 14 day course abx at discharge (10 more days Augmentin)  -patient should return to care if symptoms recur. Still not entirely convinced source of sepsis has been identified.     Acute hepatitis  - Etiology is unclear  - Patient developed nausea and vomiting with fever after eating steak dinner. However also has had some subacute symptoms. Chronicity does not seem to support acute foodborne illness. No longer having any GI symptoms.  - CT abdomen shows hepatic steatosis and no evidence of pyelonephritis  - Abdominal ultrasound showed hepatic steatosis with no gallstones.  - Negative acute hepatitis  panel  - GI saw, appreciate input  - MRCP is negative for choledocholithiasis or biliary dilatation  -possibly shock liver or elevated due to FONTAINE  - Continue to monitor LFTs. Would have her recheck in a few days after discharge as well.  If remains elevated in 4 weeks or so, then she should establish with the liver clinic as outpatient     History of factor V deficiency with DVT/PE  - INR supratherapeutic today  -Hold warfarin, take 1.5mg on 1/21 and INR check on 1/22, further dosage decisions from there     Hypokalemia, hypomagnesemia and hypophosphatemia  - Replaced     GERD  -Continue PPI  -GI recommending outpatient EGD for her longstanding intermittent abdominal symptoms    Recurrent vaginal candidiasis  -fluconazole x1 at discharge and second dose at end of abx course      Discharge Medications with Med changes:     Current Discharge Medication List        START taking these medications    Details   amoxicillin-clavulanate (AUGMENTIN) 875-125 MG tablet Take 1 tablet by mouth every 12 hours for 10 days  Qty: 20 tablet, Refills: 0    Associated Diagnoses: Sepsis with acute organ dysfunction without septic shock, due to unspecified organism, unspecified organ dysfunction type (H)      fluconazole (DIFLUCAN) 150 MG tablet Take 1 tab by mouth on 1/19 and take one tab with your last dose of antibiotics.  Qty: 2 tablet, Refills: 0    Associated Diagnoses: Recurrent candidiasis of vagina           CONTINUE these medications which have CHANGED    Details   warfarin ANTICOAGULANT (COUMADIN) 3 MG tablet Take 1-1.5 tablets (3-4.5 mg) by mouth at bedtime Take 1.5mg (one-half tab) on Sunday and INR check on Monday           CONTINUE these medications which have NOT CHANGED    Details   !! acetaZOLAMIDE (DIAMOX) 250 MG tablet Take 500 mg by mouth at bedtime      !! acetaZOLAMIDE (DIAMOX) 250 MG tablet Take 250 mg by mouth daily as needed (morning dose if needed for head pressure)      albuterol (PROAIR HFA/PROVENTIL  HFA/VENTOLIN HFA) 108 (90 Base) MCG/ACT inhaler Inhale 2 puffs into the lungs every 4 hours as needed for shortness of breath, wheezing or cough      amphetamine-dextroamphetamine (ADDERALL XR) 30 MG 24 hr capsule Take 30 mg by mouth daily Mon to Fri, usually none on weekends      cetirizine (ZYRTEC) 10 MG tablet Take 10 mg by mouth daily as needed for allergies or rhinitis      ibuprofen (ADVIL/MOTRIN) 200 MG tablet Take 200-400 mg by mouth every 6 hours as needed for pain, fever or inflammatory pain      LORazepam (ATIVAN) 0.5 MG tablet Take 1 tablet (0.5 mg) by mouth daily as needed for anxiety or withdrawal  Qty: 6 tablet, Refills: 0    Associated Diagnoses: Tachycardia; Cerebral venous thrombosis in puerperium; Factor 5 Leiden mutation, heterozygous (H24); Thromboembolic disorder (H); History of chronic urticaria; Injury of head, initial encounter; Closed fracture of orbital floor, unspecified laterality, initial encounter (H)      omeprazole (PRILOSEC) 20 MG DR capsule Take 20 mg by mouth daily as needed (heartburn)      thyroid (ARMOUR) 180 MG tablet Take 180 mg by mouth at bedtime      vitamin D2 (ERGOCALCIFEROL) 46148 units (1250 mcg) capsule Take 1 capsule by mouth every 7 days  Refills: 1       !! - Potential duplicate medications found. Please discuss with provider.            Consults     PHARMACY TO DOSE VANCO  GASTROENTEROLOGY IP CONSULT  CARE MANAGEMENT / SOCIAL WORK IP CONSULT  PHARMACY TO DOSE VANCO  PHYSICAL THERAPY ADULT IP CONSULT  OCCUPATIONAL THERAPY ADULT IP CONSULT  PHARMACY TO DOSE WARFARIN       Anticoagulation Information      Recent INR results:   Recent Labs   Lab 01/19/24  0619 01/18/24  0650 01/17/24  0659 01/16/24  0557 01/15/24  1524 01/15/24  1034   INR 4.08* 4.88* 3.10* 2.28* 2.68* 2.63*     Warfarin doses (if applicable) or name of other anticoagulant: hold today, hold tomorrow, 1.5mg on 1/21 and INR on 1/22      SIGNIFICANT IMAGING FINDINGS     Results for orders placed or  performed during the hospital encounter of 01/15/24   Chest XR,  PA & LAT    Impression    IMPRESSION: Minimal basilar opacities may be from atelectasis. Remaining lungs are clear. No pleural effusion or pneumothorax. Normal heart size.     US Abdomen Limited    Impression    IMPRESSION:  1.  No acute abnormalities.  2.  Hepatic steatosis.   CT Abdomen Pelvis w Contrast    Impression    IMPRESSION:     1.  Minimal urinary bladder wall thickening; correlate with labs.     2.  No CT evidence of pyelonephritis.    3.  Otherwise, no acute findings to explain symptoms.    4.  No free fluid or air. No abscess.    5.  Hepatic steatosis.     MR Abdomen MRCP w/o & w Contrast    Impression    IMPRESSION:    1.  No choledocholiths, biliary ductal dilation, or imaging evidence of acute cholangitis.    2.  Fatty liver.   CT Sinus w/o Contrast    Impression    IMPRESSION:  1.  Mild paranasal sinus mucosal thickening. No CT evidence of acute sinusitis.  2.  Opacified frontoethmoidal recesses and ostia of the right sphenoethmoidal recess.  3.  Chronic left orbital blowout fracture with extension of extraconal fat into the fracture defect, unchanged.  4.  Focal bony thinning or dehiscence of the medial lamina papyracea on the right.   US Abdomen Limited w Abdomen Doppler Limited    Impression    IMPRESSION:  1.  Hepatic steatosis with mild hepatomegaly.  2.  The spleen is at the upper limits of normal in size.  3.  No bile duct dilatation. Normal gallbladder.  4.  Normal abdominal liver duplex.     Echocardiogram Complete   Result Value Ref Range    LVEF  60-65%        SIGNIFICANT LABORATORY FINDINGS     See emr    Discharge Orders        Primary Care - Care Coordination Referral      Adult ENT  Referral      Reason for your hospital stay    UTI and sinus infection, sepsis     Follow-up and recommended labs and tests     Follow up with primary care provider, Itzel Young, within 7 days for hospital follow- up.  The  following labs/tests are recommended: hepatic profile, basic metabolic profile, complete blood count, INR in 3 days.  If liver enzymes do not go back down to normal eventually, then you should see GI in the clinic for further evaluation.  Follow up with your plastic surgeon.     Activity    Your activity upon discharge: activity as tolerated     When to contact your care team    Call your primary doctor if you have any of the following: chest pain, shortness of breath, fever, chills, fainting, dizziness, vomiting, constipation, dehydration, worsening pain, bleeding, skin or eyes turning yellow, or trouble urinating, or any other symptoms that are new or concerning to you.     Diet    Follow this diet upon discharge: resume your regular diet       Examination   Physical Exam   Temp:  [98  F (36.7  C)-98.4  F (36.9  C)] 98.2  F (36.8  C)  Pulse:  [77-89] 77  Resp:  [16-20] 18  BP: (118-135)/(64-79) 124/68  SpO2:  [96 %-98 %] 96 %  Wt Readings from Last 1 Encounters:   01/15/24 113.4 kg (250 lb)       General: in no apparent distress, non-toxic, and alert female lying in hospital bed oriented x3.    HEENT: Head normocephalic atraumatic, oral mucosa moist. Sclerae anicteric  Skin: No rashes or lesions  Extremities: no edema  Psych: Normal affect, mood euthymic  Neuro: Grossly normal    Please see EMR for more detailed significant labs, imaging, consultant notes etc.    I, Kelsey Hood MD, personally saw the patient today and spent greater than 30 minutes discharging this patient.    Kelsey Hood MD  [facility]    CC:Itzel Young

## 2024-01-19 NOTE — PROGRESS NOTES
- VSS, denies pain  - Pt independent and in bed  - no significant events    Flower Latham RN 7:37 PM

## 2024-01-19 NOTE — PLAN OF CARE
"PRIMARY DIAGNOSIS: \"GENERIC\" NURSING  OUTPATIENT/OBSERVATION GOALS TO BE MET BEFORE DISCHARGE:  ADLs back to baseline: Yes    Activity and level of assistance: Ambulating independently.    Pain status: Pain free.    Return to near baseline physical activity: Yes     Discharge Planner Nurse   Safe discharge environment identified: Yes  Barriers to discharge: No       Entered by: Maritza Pratt RN 01/19/2024 5:30 AM   Pt is alert and oriented. Able to make needs known. Up independently to the bathroom. Denies pain. Uneventful shift.  Maritza Pratt RN    Please review provider order for any additional goals.   Nurse to notify provider when observation goals have been met and patient is ready for discharge.Goal Outcome Evaluation:                        "

## 2024-01-19 NOTE — PROGRESS NOTES
[facility] DIGESTIVE HEALTH PROGRESS NOTE      Subjective:              The plan for discharge today is noted.   The patient denies any new abdominal symptoms.  She notes intermittent abdominal pain that lasts seconds and moves around.  She also notes that this is not new.  She may have a hard stool followed by transient loose stool but denies any diarrhea.  She denies any gastrointestinal bleeding.  She denies any recurrent fevers or chills.  She denies any current nausea.  She describes chronic intermittent upper abdominal discomfort    Objective:                  Vital signs in last 24 hrs;  Temp:  [98  F (36.7  C)-98.4  F (36.9  C)] 98.2  F (36.8  C)  Pulse:  [77-89] 77  Resp:  [16-20] 18  BP: (118-135)/(64-79) 124/68  SpO2:  [96 %-98 %] 96 %    Physical Exam:   General: Comfortable appearing. Awake.   Cardiovascular: Regular rate.   Chest: Non-labored breathing. Symmetric chest rise.   Abdomen: soft, non-tender, non-distended      Current Labs:  CBC RESULTS:   Recent Labs   Lab Test 01/19/24  0619 01/18/24  0650 01/17/24  0659   WBC 7.2 7.1 6.6   RBC 3.51* 3.45* 3.38*   HGB 10.0* 9.9* 9.6*   HCT 32.0* 31.3* 30.6*   MCV 91 91 91   MCH 28.5 28.7 28.4   MCHC 31.3* 31.6 31.4*   RDW 14.7 14.6 14.2    174 148*        CMP Results:   Recent Labs   Lab Test 01/19/24  0759 01/19/24  0619 01/18/24 2127 01/18/24  1728 01/18/24  0650 01/17/24  0816 01/17/24  0659   NA  --  140  --   --  139  --  140   POTASSIUM  --  4.2  --   --  4.3  --  4.1   CHLORIDE  --  113*  --   --  115*  --  113*   CO2  --  20*  --   --  15*  --  16*   ANIONGAP  --  7  --   --  9  --  11   GLC 90 100* 96   < > 98   < > 121*   BUN  --  9.4  --   --  9.3  --  12.8   CR  --  0.88  --   --  0.84  --  0.95   BILITOTAL  --  0.8  --   --  0.9  --  1.3*   ALKPHOS  --  268*  --   --  252*  --  196*   ALT  --  150*  --   --  163*  --  163*   AST  --  92*  --   --  123*  --  133*    < > = values in this interval not displayed.        INR Results:   Recent  Labs   Lab Test 01/19/24  0619 01/18/24  0650 01/17/24  0659   INR 4.08* 4.88* 3.10*            Assessment:       53-year-old female who presented with fever and who was noted to have a rise in liver chemistries.  She also describes joint pains preceding the onset of fever.  She is feeling significantly better at this time, aside from sinus congestion.  She has not had any ongoing fever since presentation.  Imaging did not reveal any evidence for biliary dilatation.  Infection workup was otherwise negative.  This may have been due to an acute infectious syndrome such as viral syndrome, and that is now improving.  She was hospitalized a few weeks ago with influenza and received Tamiflu.  Tamiflu can occasionally be associated with liver test elevations but would not likely explain this acute syndrome.  She notes that she was feeling well soon after recent discharge.  She does have a history of factor V deficiency and had a CVA when she was 23.  A right upper quadrant Doppler was negative for thrombosis.  Autoimmune hepatitis markers were negative.    Plan:     Favor rechecking liver chemistries again early next week.  If they are not considerably better or rising, would favor follow-up in our Hepatology Clinic soon thereafter  We will plan to add an upper endoscopy to her already scheduled colonoscopy (for evaluation of chronic intermittent upper abdominal pain)  She is advised to return immediately should she have recurrent fevers or significant abdominal pain or any new concerns otherwise  Please call with any questions or concerns.    Total time spent today in the management of this patient (which may include  chart review, review of imaging, discussion of case with additional specialists and/or family members, face to face consultation/exam with patient, documentation, and coordination of care): 30 minutes          Ethan Sethi MD  Thank you for the opportunity to participate in the care of this patient.   Please  feel free to call me with any questions or concerns.  Phone number [phone].          1/19/2024 11:12 AM

## 2024-01-19 NOTE — PROGRESS NOTES
Care Management Discharge Note    Discharge Date: 01/19/2024       Discharge Disposition: Home    Discharge Services: None    Discharge DME: None    Discharge Transportation:  Family/friends    Private pay costs discussed: Not applicable    Does the patient's insurance plan have a 3 day qualifying hospital stay waiver?  No    PAS Confirmation Code:  NA  Patient/family educated on Medicare website which has current facility and service quality ratings: no    Education Provided on the Discharge Plan: Yes per team  Persons Notified of Discharge Plans: Patient per team  Patient/Family in Agreement with the Plan: yes    Handoff Referral Completed: Yes    Additional Information:  Patient discharge home. No CM discharge planning need. Family will transport.    Berta Lala RN

## 2024-01-20 LAB
BACTERIA BLD CULT: NO GROWTH
BACTERIA BLD CULT: NO GROWTH

## 2024-01-23 ENCOUNTER — LAB REQUISITION (OUTPATIENT)
Dept: LAB | Facility: CLINIC | Age: 54
End: 2024-01-23

## 2024-01-23 DIAGNOSIS — B17.9 ACUTE VIRAL HEPATITIS, UNSPECIFIED: ICD-10-CM

## 2024-01-23 LAB
ALBUMIN SERPL BCG-MCNC: 4.2 G/DL (ref 3.5–5.2)
ALP SERPL-CCNC: 214 U/L (ref 40–150)
ALT SERPL W P-5'-P-CCNC: 101 U/L (ref 0–50)
ANION GAP SERPL CALCULATED.3IONS-SCNC: 13 MMOL/L (ref 7–15)
AST SERPL W P-5'-P-CCNC: 63 U/L (ref 0–45)
BILIRUB SERPL-MCNC: 0.5 MG/DL
BUN SERPL-MCNC: 15.9 MG/DL (ref 6–20)
CALCIUM SERPL-MCNC: 8.9 MG/DL (ref 8.6–10)
CHLORIDE SERPL-SCNC: 108 MMOL/L (ref 98–107)
CREAT SERPL-MCNC: 0.96 MG/DL (ref 0.51–0.95)
DEPRECATED HCO3 PLAS-SCNC: 18 MMOL/L (ref 22–29)
EGFRCR SERPLBLD CKD-EPI 2021: 71 ML/MIN/1.73M2
GLUCOSE SERPL-MCNC: 118 MG/DL (ref 70–99)
POTASSIUM SERPL-SCNC: 3.7 MMOL/L (ref 3.4–5.3)
PROT SERPL-MCNC: 7.2 G/DL (ref 6.4–8.3)
SODIUM SERPL-SCNC: 139 MMOL/L (ref 135–145)

## 2024-01-23 PROCEDURE — 80053 COMPREHEN METABOLIC PANEL: CPT | Performed by: PHYSICIAN ASSISTANT

## 2024-01-30 ENCOUNTER — LAB REQUISITION (OUTPATIENT)
Dept: LAB | Facility: CLINIC | Age: 54
End: 2024-01-30

## 2024-01-30 DIAGNOSIS — Z86.19 PERSONAL HISTORY OF OTHER INFECTIOUS AND PARASITIC DISEASES: ICD-10-CM

## 2024-01-30 DIAGNOSIS — B17.9 ACUTE VIRAL HEPATITIS, UNSPECIFIED: ICD-10-CM

## 2024-01-30 LAB
BASOPHILS # BLD AUTO: 0 10E3/UL (ref 0–0.2)
BASOPHILS NFR BLD AUTO: 1 %
EOSINOPHIL # BLD AUTO: 0.1 10E3/UL (ref 0–0.7)
EOSINOPHIL NFR BLD AUTO: 2 %
ERYTHROCYTE [DISTWIDTH] IN BLOOD BY AUTOMATED COUNT: 13.4 % (ref 10–15)
HCT VFR BLD AUTO: 35.5 % (ref 35–47)
HGB BLD-MCNC: 11.2 G/DL (ref 11.7–15.7)
IMM GRANULOCYTES # BLD: 0 10E3/UL
IMM GRANULOCYTES NFR BLD: 0 %
LYMPHOCYTES # BLD AUTO: 1.8 10E3/UL (ref 0.8–5.3)
LYMPHOCYTES NFR BLD AUTO: 34 %
MCH RBC QN AUTO: 28.5 PG (ref 26.5–33)
MCHC RBC AUTO-ENTMCNC: 31.5 G/DL (ref 31.5–36.5)
MCV RBC AUTO: 90 FL (ref 78–100)
MONOCYTES # BLD AUTO: 0.5 10E3/UL (ref 0–1.3)
MONOCYTES NFR BLD AUTO: 9 %
NEUTROPHILS # BLD AUTO: 2.9 10E3/UL (ref 1.6–8.3)
NEUTROPHILS NFR BLD AUTO: 54 %
NRBC # BLD AUTO: 0 10E3/UL
NRBC BLD AUTO-RTO: 0 /100
PLATELET # BLD AUTO: 305 10E3/UL (ref 150–450)
RBC # BLD AUTO: 3.93 10E6/UL (ref 3.8–5.2)
WBC # BLD AUTO: 5.3 10E3/UL (ref 4–11)

## 2024-01-30 PROCEDURE — 85025 COMPLETE CBC W/AUTO DIFF WBC: CPT | Performed by: PHYSICIAN ASSISTANT

## 2024-01-30 PROCEDURE — 80053 COMPREHEN METABOLIC PANEL: CPT | Performed by: PHYSICIAN ASSISTANT

## 2024-01-31 LAB
ALBUMIN SERPL BCG-MCNC: 3.9 G/DL (ref 3.5–5.2)
ALP SERPL-CCNC: 126 U/L (ref 40–150)
ALT SERPL W P-5'-P-CCNC: 40 U/L (ref 0–50)
ANION GAP SERPL CALCULATED.3IONS-SCNC: 11 MMOL/L (ref 7–15)
AST SERPL W P-5'-P-CCNC: 30 U/L (ref 0–45)
BILIRUB SERPL-MCNC: 0.3 MG/DL
BUN SERPL-MCNC: 13.1 MG/DL (ref 6–20)
CALCIUM SERPL-MCNC: 8.5 MG/DL (ref 8.6–10)
CHLORIDE SERPL-SCNC: 109 MMOL/L (ref 98–107)
CREAT SERPL-MCNC: 0.79 MG/DL (ref 0.51–0.95)
DEPRECATED HCO3 PLAS-SCNC: 19 MMOL/L (ref 22–29)
EGFRCR SERPLBLD CKD-EPI 2021: 90 ML/MIN/1.73M2
GLUCOSE SERPL-MCNC: 111 MG/DL (ref 70–99)
POTASSIUM SERPL-SCNC: 3.8 MMOL/L (ref 3.4–5.3)
PROT SERPL-MCNC: 6.7 G/DL (ref 6.4–8.3)
SODIUM SERPL-SCNC: 139 MMOL/L (ref 135–145)

## 2024-02-04 LAB
ATRIAL RATE - MUSE: 161 BPM
DIASTOLIC BLOOD PRESSURE - MUSE: NORMAL MMHG
INTERPRETATION ECG - MUSE: NORMAL
P AXIS - MUSE: 49 DEGREES
PR INTERVAL - MUSE: 126 MS
QRS DURATION - MUSE: 68 MS
QT - MUSE: 238 MS
QTC - MUSE: 389 MS
R AXIS - MUSE: -24 DEGREES
SYSTOLIC BLOOD PRESSURE - MUSE: NORMAL MMHG
T AXIS - MUSE: 57 DEGREES
VENTRICULAR RATE- MUSE: 161 BPM

## 2024-02-06 ENCOUNTER — LAB REQUISITION (OUTPATIENT)
Dept: LAB | Facility: CLINIC | Age: 54
End: 2024-02-06

## 2024-02-06 DIAGNOSIS — Z86.19 PERSONAL HISTORY OF OTHER INFECTIOUS AND PARASITIC DISEASES: ICD-10-CM

## 2024-02-06 DIAGNOSIS — B17.9 ACUTE VIRAL HEPATITIS, UNSPECIFIED: ICD-10-CM

## 2024-02-06 LAB
BASOPHILS # BLD AUTO: 0 10E3/UL (ref 0–0.2)
BASOPHILS NFR BLD AUTO: 0 %
EOSINOPHIL # BLD AUTO: 0.2 10E3/UL (ref 0–0.7)
EOSINOPHIL NFR BLD AUTO: 3 %
ERYTHROCYTE [DISTWIDTH] IN BLOOD BY AUTOMATED COUNT: 13.2 % (ref 10–15)
HCT VFR BLD AUTO: 36.5 % (ref 35–47)
HGB BLD-MCNC: 12 G/DL (ref 11.7–15.7)
IMM GRANULOCYTES # BLD: 0 10E3/UL
IMM GRANULOCYTES NFR BLD: 0 %
LYMPHOCYTES # BLD AUTO: 2.4 10E3/UL (ref 0.8–5.3)
LYMPHOCYTES NFR BLD AUTO: 42 %
MCH RBC QN AUTO: 29.3 PG (ref 26.5–33)
MCHC RBC AUTO-ENTMCNC: 32.9 G/DL (ref 31.5–36.5)
MCV RBC AUTO: 89 FL (ref 78–100)
MONOCYTES # BLD AUTO: 0.5 10E3/UL (ref 0–1.3)
MONOCYTES NFR BLD AUTO: 9 %
NEUTROPHILS # BLD AUTO: 2.7 10E3/UL (ref 1.6–8.3)
NEUTROPHILS NFR BLD AUTO: 46 %
NRBC # BLD AUTO: 0 10E3/UL
NRBC BLD AUTO-RTO: 0 /100
PLATELET # BLD AUTO: 332 10E3/UL (ref 150–450)
RBC # BLD AUTO: 4.1 10E6/UL (ref 3.8–5.2)
WBC # BLD AUTO: 5.8 10E3/UL (ref 4–11)

## 2024-02-06 PROCEDURE — 85025 COMPLETE CBC W/AUTO DIFF WBC: CPT | Performed by: PHYSICIAN ASSISTANT

## 2024-02-06 PROCEDURE — 82040 ASSAY OF SERUM ALBUMIN: CPT | Performed by: PHYSICIAN ASSISTANT

## 2024-02-07 LAB
ALBUMIN SERPL BCG-MCNC: 3.9 G/DL (ref 3.5–5.2)
ALP SERPL-CCNC: 109 U/L (ref 40–150)
ALT SERPL W P-5'-P-CCNC: 48 U/L (ref 0–50)
ANION GAP SERPL CALCULATED.3IONS-SCNC: 10 MMOL/L (ref 7–15)
AST SERPL W P-5'-P-CCNC: 43 U/L (ref 0–45)
BILIRUB SERPL-MCNC: 0.4 MG/DL
BUN SERPL-MCNC: 8.5 MG/DL (ref 6–20)
CALCIUM SERPL-MCNC: 8.5 MG/DL (ref 8.6–10)
CHLORIDE SERPL-SCNC: 112 MMOL/L (ref 98–107)
CREAT SERPL-MCNC: 0.76 MG/DL (ref 0.51–0.95)
DEPRECATED HCO3 PLAS-SCNC: 19 MMOL/L (ref 22–29)
EGFRCR SERPLBLD CKD-EPI 2021: >90 ML/MIN/1.73M2
GLUCOSE SERPL-MCNC: 110 MG/DL (ref 70–99)
POTASSIUM SERPL-SCNC: 3.9 MMOL/L (ref 3.4–5.3)
PROT SERPL-MCNC: 6.6 G/DL (ref 6.4–8.3)
SODIUM SERPL-SCNC: 141 MMOL/L (ref 135–145)

## 2024-02-13 ENCOUNTER — LAB REQUISITION (OUTPATIENT)
Dept: LAB | Facility: CLINIC | Age: 54
End: 2024-02-13

## 2024-02-13 DIAGNOSIS — Z86.19 PERSONAL HISTORY OF OTHER INFECTIOUS AND PARASITIC DISEASES: ICD-10-CM

## 2024-02-13 DIAGNOSIS — B17.9 ACUTE VIRAL HEPATITIS, UNSPECIFIED: ICD-10-CM

## 2024-02-13 LAB
ALBUMIN SERPL BCG-MCNC: 3.8 G/DL (ref 3.5–5.2)
ALP SERPL-CCNC: 99 U/L (ref 40–150)
ALT SERPL W P-5'-P-CCNC: 34 U/L (ref 0–50)
ANION GAP SERPL CALCULATED.3IONS-SCNC: 9 MMOL/L (ref 7–15)
AST SERPL W P-5'-P-CCNC: 32 U/L (ref 0–45)
BASOPHILS # BLD AUTO: 0 10E3/UL (ref 0–0.2)
BASOPHILS NFR BLD AUTO: 0 %
BILIRUB SERPL-MCNC: 0.3 MG/DL
BUN SERPL-MCNC: 10.1 MG/DL (ref 6–20)
CALCIUM SERPL-MCNC: 8.4 MG/DL (ref 8.6–10)
CHLORIDE SERPL-SCNC: 111 MMOL/L (ref 98–107)
CREAT SERPL-MCNC: 0.79 MG/DL (ref 0.51–0.95)
DEPRECATED HCO3 PLAS-SCNC: 18 MMOL/L (ref 22–29)
EGFRCR SERPLBLD CKD-EPI 2021: 90 ML/MIN/1.73M2
EOSINOPHIL # BLD AUTO: 0.3 10E3/UL (ref 0–0.7)
EOSINOPHIL NFR BLD AUTO: 4 %
ERYTHROCYTE [DISTWIDTH] IN BLOOD BY AUTOMATED COUNT: 13.4 % (ref 10–15)
GLUCOSE SERPL-MCNC: 108 MG/DL (ref 70–99)
HCT VFR BLD AUTO: 36.7 % (ref 35–47)
HGB BLD-MCNC: 11.6 G/DL (ref 11.7–15.7)
IMM GRANULOCYTES # BLD: 0 10E3/UL
IMM GRANULOCYTES NFR BLD: 0 %
LYMPHOCYTES # BLD AUTO: 2.4 10E3/UL (ref 0.8–5.3)
LYMPHOCYTES NFR BLD AUTO: 34 %
MCH RBC QN AUTO: 28 PG (ref 26.5–33)
MCHC RBC AUTO-ENTMCNC: 31.6 G/DL (ref 31.5–36.5)
MCV RBC AUTO: 88 FL (ref 78–100)
MONOCYTES # BLD AUTO: 0.6 10E3/UL (ref 0–1.3)
MONOCYTES NFR BLD AUTO: 9 %
NEUTROPHILS # BLD AUTO: 3.7 10E3/UL (ref 1.6–8.3)
NEUTROPHILS NFR BLD AUTO: 53 %
NRBC # BLD AUTO: 0 10E3/UL
NRBC BLD AUTO-RTO: 0 /100
PLATELET # BLD AUTO: 250 10E3/UL (ref 150–450)
POTASSIUM SERPL-SCNC: 4.1 MMOL/L (ref 3.4–5.3)
PROT SERPL-MCNC: 6.6 G/DL (ref 6.4–8.3)
RBC # BLD AUTO: 4.15 10E6/UL (ref 3.8–5.2)
SODIUM SERPL-SCNC: 138 MMOL/L (ref 135–145)
WBC # BLD AUTO: 7 10E3/UL (ref 4–11)

## 2024-02-13 PROCEDURE — 85025 COMPLETE CBC W/AUTO DIFF WBC: CPT | Performed by: PHYSICIAN ASSISTANT

## 2024-02-13 PROCEDURE — 84075 ASSAY ALKALINE PHOSPHATASE: CPT | Performed by: PHYSICIAN ASSISTANT

## 2024-02-13 PROCEDURE — 84155 ASSAY OF PROTEIN SERUM: CPT | Performed by: PHYSICIAN ASSISTANT

## 2024-06-07 ENCOUNTER — LAB REQUISITION (OUTPATIENT)
Dept: LAB | Facility: CLINIC | Age: 54
End: 2024-06-07

## 2024-06-07 DIAGNOSIS — R82.90 UNSPECIFIED ABNORMAL FINDINGS IN URINE: ICD-10-CM

## 2024-06-07 DIAGNOSIS — R79.89 OTHER SPECIFIED ABNORMAL FINDINGS OF BLOOD CHEMISTRY: ICD-10-CM

## 2024-06-07 PROCEDURE — 80053 COMPREHEN METABOLIC PANEL: CPT | Performed by: PHYSICIAN ASSISTANT

## 2024-06-07 PROCEDURE — 87086 URINE CULTURE/COLONY COUNT: CPT | Performed by: PHYSICIAN ASSISTANT

## 2024-06-07 PROCEDURE — 83690 ASSAY OF LIPASE: CPT | Performed by: PHYSICIAN ASSISTANT

## 2024-06-07 PROCEDURE — 83735 ASSAY OF MAGNESIUM: CPT | Performed by: PHYSICIAN ASSISTANT

## 2024-06-08 LAB
ALBUMIN SERPL BCG-MCNC: 4.6 G/DL (ref 3.5–5.2)
ALP SERPL-CCNC: 102 U/L (ref 40–150)
ALT SERPL W P-5'-P-CCNC: 14 U/L (ref 0–50)
ANION GAP SERPL CALCULATED.3IONS-SCNC: 11 MMOL/L (ref 7–15)
AST SERPL W P-5'-P-CCNC: 19 U/L (ref 0–45)
BILIRUB SERPL-MCNC: 0.4 MG/DL
BUN SERPL-MCNC: 13.6 MG/DL (ref 6–20)
CALCIUM SERPL-MCNC: 9.2 MG/DL (ref 8.6–10)
CHLORIDE SERPL-SCNC: 105 MMOL/L (ref 98–107)
CREAT SERPL-MCNC: 1.1 MG/DL (ref 0.51–0.95)
DEPRECATED HCO3 PLAS-SCNC: 21 MMOL/L (ref 22–29)
EGFRCR SERPLBLD CKD-EPI 2021: 60 ML/MIN/1.73M2
GLUCOSE SERPL-MCNC: 102 MG/DL (ref 70–99)
LIPASE SERPL-CCNC: 59 U/L (ref 13–60)
MAGNESIUM SERPL-MCNC: 2.3 MG/DL (ref 1.7–2.3)
POTASSIUM SERPL-SCNC: 4.1 MMOL/L (ref 3.4–5.3)
PROT SERPL-MCNC: 7.3 G/DL (ref 6.4–8.3)
SODIUM SERPL-SCNC: 137 MMOL/L (ref 135–145)

## 2024-06-09 LAB — BACTERIA UR CULT: NORMAL

## 2024-06-23 ENCOUNTER — HEALTH MAINTENANCE LETTER (OUTPATIENT)
Age: 54
End: 2024-06-23

## 2024-10-04 NOTE — PROGRESS NOTES
In person evaluation    [facility]  2/26/2020, in person visit  8/11/2021, in person visit  1/25/2023, in person visit  10/2/2023, in person visit  10/7/2024, in person visit      54-year-old followed neurologically for:  Sagittal sinus venous thrombosis 1994  Heterozygous factor V gene mutation  Obstructive sleep apnea  Pseudotumor cerebri with increased ICP and visual changes in the past      Since last seen a year ago    Hospitalized 12/26/2023 - 12/28/2023 (influenza A infection/sinus tachycardia) CT angiogram negative for pulmonary embolus, lower extremity venous ultrasounds negative for DVT, INR therapeutic range  Hospitalized 1/15/2024 - 1/19/2024 (sepsis treated with broad-spectrum antibiotics)    Pseudotumor cerebri under good control  Blind spot seems stable no significant enlargement  Headaches very variable  When she has a flurry she takes the increased rescue dose of Diamox  Otherwise is on Diamox 250 mg tablet, 2 at nighttime    Feels the pseudotumor is stable  Discussed past clotting disorder  Has followed with hematology in the past  Is on warfarin followed by family  Family history is positive for clots in multiple family members son without factor V problem        A.  Pseudotumor cerebri        Diagnosed 1994 with sagittal sinus venous thrombosis        Has had some mild cataracts in the past, followed by ophthalmology        Has seen Dr. Julio C Hernandez of ophthalmology    Headaches                          1/2023        10/2023                10/2024  Frequency       3/week       2-3/month             1/month  Duration          4 hours      4 plus hours           4-5 hours  Severity            mild       mild to moderate       moderate      Tylenol and a sinus medicine 1.5 tabs per week  Due to tachycardia is supposed to be off of the Sudafed  No sign of withdrawal headache    Does take an extra Diamox occasionally  Otherwise is on 2 tabs at night  Medications written as 250 mg Diamox 1 tab 3 times daily  This  way she has a rescue          B. Sagittal venous thrombus in 1994.       Heterozygous factor V gene mutation.       Patient on blood thinners per primary MD         Past history, the patient had blood clots also back in 2014 during the early part of the year, and was seen at the [facility].        These occurred in the left arm, left leg, right arm clots, and multiple pulmonary emboli and she was placed back on her Coumadin by her primary.    C.  Obstructive sleep apnea       History of obstructive sleep apnea, uses the CPAP machine.        Working with a new sleep person is using the CPAP machine currently might have some narcoleptic type symptomatology      D. Pre-existing ADD in the past    E.  May 2023 blowout fracture left orbit       Patient has an autistic child that accidentally hit her by the left eye and caused a blowout fracture by the left eye       She had diplopia and numbness in the V2 distribution       She also had some vertigo probably from the diplopia       Some dysesthesia felt like itching in the ear canal         7/7/2023 left orbital floor medial wall fracture repair with implant       Has residual diplopia looking up and to the left but tolerates it       Numbness on the left side of the face is getting to be less       She has some tactile sensation temperature appreciation in V2       There is 2 teeth that are numb upper left side            Past medical history  Heterozygous factor V gene mutation  Sagittal sinus venous thrombosis 1994  Blood clots in 2014  Cataracts  ADD  Depression  Obstructive sleep apnea          She has the history of:  1. Sagittal venous thrombus in 1994.  2. Heterozygous factor V gene mutation.  3. History of obstructive sleep apnea, uses the CPAP machine. Working with a new sleep person is using the CPAP machine currently might have some narcoleptic type symptomatology  4. Intermittent headaches which seem well controlled and responsive to Diamox,  using a low dose of 250 mg, one to two tablets per day. Increasing dose today up to 4 times per day  5. Has had some mild cataracts in the past, followed by  ophthalmology.  6. Pre-existing ADD in the past    Past history, the patient had blood clots also back in 2014 during the early part of the year, and was seen at the [facility]. These occurred in the left arm, left leg, right arm clots, and multiple pulmonary emboli and she was placed back on her Coumadin by her primary.      Past medical history  Heterozygous factor V gene mutation  Sagittal sinus venous thrombosis 1994  Blood clots in 2014  Cataracts  ADD  Depression (She switched from Zoloft to Wellbutrin for depression treatment.)  Obstructive sleep apnea    .      Habits  Non-smoker  Rare alcoholic beverage  She has adopted children with special needs.        Family history  Paternal grandfather with stroke  Father with hypertension and high cholesterol and abdominal aneurysm  Mother with epilepsy, migraines, Alzheimer's disease and arthritis  Father had blood clots  Brother had blood clots  Sister has blood clots and does not have a factor V gene problem        Work-up  History of heterozygous factor V Leiden  7 sinus venous thrombosis 1994  MRI scan brain/MRV head December 2005  A.  Negative MRI scan of the head  B.  Improvement in the superior sagittal sinus thrombosis partially recannulized compared to 2000  EEG December 2007, mild right theta disorganization more so after hyperventilation bursts of theta no epileptiform activity  [facility] August 2009 optic fundi look good  Stop anticoagulation February 2014 developed clot in the left arm left leg and right arm and multiple pulmonary emboli (restarted anticoagulation)  CT head 5/15/2023 see official report  1.  No acute intracranial process  CT sinuses 1/17/2024 see official report  1.  Mild paranasal sinus mucosal thickening.         No CT evidence of acute  sinusitis.  2.  Opacified frontoethmoidal recesses and ostia of the right sphenoethmoidal recess.  3.  Chronic left orbital blowout fracture with extension of extraconal fat into the fracture defect, unchanged.  4.  Focal bony thinning or dehiscence of the medial lamina papyracea on the right      Laboratory review                      2/2014     1/2015   12/2017    10/2018   2/13/2021    6/17/2022      6/2023       6/2024  Na/K          138/2.9     140/2.8   141/3.8     140/2.8    139/3.9          141/4.5       141/4.4       137/4.1  Cl/CO2       111/22     112/19    112/20      109/23     111/19           110/20           108/20       105/21  BUN/Cr                                                                                                                16.1/0.78    13.6/1.10  Glu                                                                               93                  91              100             102  AST                                                                                                   18                                  19    WBC/Hgb                                                                   9.8/14.3          7.2/13.6      12.2/13.0  Plts                                                                            289,000           302,000       297,000   INR                                                                            1.7                                     1.66  TSH                                                                                                    3.23            0.49          Review of systems    Headaches but no significant visual changes see above  Blind spots seem to be stable  No obscurations    No dysarthria  No dysphagia  No chest pain    Has had some flareup of her asthma stable today    Dry eyes due to her contacts following with optometrist    Numbness left V2  Blowout fracture May 2023 residual diplopia looking left and  superiorly    Otherwise review systems negative            Exam  Blood pressure 120/88, pulse 120  HEENT normal  Lungs clear  Heart rate regular  Abdomen soft  Symmetrical pulses  No edema in the feet    Neurologic exam  Alert oriented x3  No aphasia  No neglect  Normal memory recall    Cranial nerves II through XII significant for  Mild increased blind spot bilaterally by confrontation  Optic fundi look okay  Extraocular movements relatively intact some diplopia looking to the left and superiorly from her block fracture  Asymmetric palpebral fissures right being greater than left  No nystagmus  Visual fields intact  Face symmetrical  Temperature appreciation symmetrical  Decreased light touch circular area V2 and 2 front teeth off to the left  Some itchiness in her left ear canal      Upper extremities  No drift no tremor normal finger-nose    Lower extremity strength normal    Reflexes symmetrical toes downgoing    Gait normal      Assessment/Plan     1.  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 Mcleod/ ophthalmologist following and tracking visual fields for good visual field to see if there is increased papilledema      2.  Factor V Leiden (D68.51)       Continue blood thinner warfarin INRs checked through primary       1994 maulik sinus thrombosis intracranially       2014 multifocal peripheral venous thrombosis and bilateral pulmonary emboli restarted warfarin    3.  RENETTA (Obstructive Sleep Apnea) (G47.33) Is wearing the CPAP mask at this time    She knows the risks and benefits of Diamox and the need for using this.   She stays well hydrated.   She knows about kidney stones, and this was re-discussed today also.   It can also cause electrolyte abnormalities, but she is on  low dose at this time.    Current plan:    1. Diamox 250 mg tablet up to 3 tablets/day, usually on 2 uses a third as a rescue  2. Should follow up yearly basis sooner  if there is problems  3. See ophthalmologist for formal visual fields  4. Anticoagulation is controlled by her primary for her difficulty with clots, pulmonary emboli, and her clotting disorder as above.    In the past  5. Follow-up with a sleep specialist to see if she has narcoleptic tendencies or not.    She has some ADD the Adderall probably helps with that with concentration   I do not feel that she has a progressive memory problem but if she is getting poor sleep she had ADD concentration may be difficult we had a long discussion about that she probably did not have any significant brain damage from her past clots but needs to treat her symptoms when they arise    Feels the pseudotumor is stable  Discussed past clotting disorder  Has followed with hematology in the past  Is on warfarin followed by family  Family history is positive for clots in multiple family members son without factor V problem      Refilled her Diamox  Multiple issues as above discussed  Patient will follow-up on a yearly basis    Total care time today 30 minutes  The longitudinal plan of care for the diagnosis(es)/condition(s) as documented were addressed during this visit. Due to the added complexity in care, I will continue to support Cayetano in the subsequent management and with ongoing continuity of care.        As per the visit today  Reviewed hospitalization 12/26/2023  Reviewed hospitalization 1/15/2024  Reviewed laboratory data

## 2024-10-07 ENCOUNTER — OFFICE VISIT (OUTPATIENT)
Dept: NEUROLOGY | Facility: CLINIC | Age: 54
End: 2024-10-07
Payer: COMMERCIAL

## 2024-10-07 VITALS
BODY MASS INDEX: 43.87 KG/M2 | HEIGHT: 64 IN | SYSTOLIC BLOOD PRESSURE: 120 MMHG | DIASTOLIC BLOOD PRESSURE: 88 MMHG | HEART RATE: 120 BPM | WEIGHT: 257 LBS

## 2024-10-07 DIAGNOSIS — D68.51 FACTOR 5 LEIDEN MUTATION, HETEROZYGOUS (H): ICD-10-CM

## 2024-10-07 DIAGNOSIS — G93.2 PSEUDOTUMOR CEREBRI SYNDROME: Primary | ICD-10-CM

## 2024-10-07 PROCEDURE — 99214 OFFICE O/P EST MOD 30 MIN: CPT | Performed by: PSYCHIATRY & NEUROLOGY

## 2024-10-07 PROCEDURE — G2211 COMPLEX E/M VISIT ADD ON: HCPCS | Performed by: PSYCHIATRY & NEUROLOGY

## 2024-10-07 RX ORDER — ACETAZOLAMIDE 250 MG/1
250 TABLET ORAL
Qty: 270 TABLET | Refills: 3 | Status: SHIPPED | OUTPATIENT
Start: 2024-10-07

## 2024-10-07 NOTE — NURSING NOTE
Chief Complaint   Patient presents with    Pseudotumor cerebri syndrome     Annual follow up. Pt states she is doing well.     Nisreen Horner LPN on 10/7/2024 at 11:34 AM

## 2024-10-07 NOTE — LETTER
10/7/2024      Anna Nieves  [address] [facility] [id number]      Dear Colleague,    Thank you for referring your patient, Anna Nieves, to the [facility] [place]. Please see a copy of my visit note below.    In person evaluation    HPI  2/26/2020, in person visit  8/11/2021, in person visit  1/25/2023, in person visit  10/2/2023, in person visit  10/7/2024, in person visit      54-year-old followed neurologically for:  Sagittal sinus venous thrombosis 1994  Heterozygous factor V gene mutation  Obstructive sleep apnea  Pseudotumor cerebri with increased ICP and visual changes in the past      Since last seen a year ago    Hospitalized 12/26/2023 - 12/28/2023 (influenza A infection/sinus tachycardia) CT angiogram negative for pulmonary embolus, lower extremity venous ultrasounds negative for DVT, INR therapeutic range  Hospitalized 1/15/2024 - 1/19/2024 (sepsis treated with broad-spectrum antibiotics)    Pseudotumor cerebri under good control  Blind spot seems stable no significant enlargement  Headaches very variable  When she has a flurry she takes the increased rescue dose of Diamox  Otherwise is on Diamox 250 mg tablet, 2 at nighttime    Feels the pseudotumor is stable  Discussed past clotting disorder  Has followed with hematology in the past  Is on warfarin followed by family  Family history is positive for clots in multiple family members son without factor V problem        A.  Pseudotumor cerebri        Diagnosed 1994 with sagittal sinus venous thrombosis        Has had some mild cataracts in the past, followed by ophthalmology        Has seen Dr. Julio C Hernandez of ophthalmology    Headaches                          1/2023        10/2023                10/2024  Frequency       3/week       2-3/month             1/month  Duration          4 hours      4 plus hours           4-5 hours  Severity            mild       mild to moderate       moderate      Tylenol and a sinus  medicine 1.5 tabs per week  Due to tachycardia is supposed to be off of the Sudafed  No sign of withdrawal headache    Does take an extra Diamox occasionally  Otherwise is on 2 tabs at night  Medications written as 250 mg Diamox 1 tab 3 times daily  This way she has a rescue          B. Sagittal venous thrombus in 1994.       Heterozygous factor V gene mutation.       Patient on blood thinners per primary MD         Past history, the patient had blood clots also back in 2014 during the early part of the year, and was seen at the [facility].        These occurred in the left arm, left leg, right arm clots, and multiple pulmonary emboli and she was placed back on her Coumadin by her primary.    C.  Obstructive sleep apnea       History of obstructive sleep apnea, uses the CPAP machine.        Working with a new sleep person is using the CPAP machine currently might have some narcoleptic type symptomatology      D. Pre-existing ADD in the past    E.  May 2023 blowout fracture left orbit       Patient has an autistic child that accidentally hit her by the left eye and caused a blowout fracture by the left eye       She had diplopia and numbness in the V2 distribution       She also had some vertigo probably from the diplopia       Some dysesthesia felt like itching in the ear canal         7/7/2023 left orbital floor medial wall fracture repair with implant       Has residual diplopia looking up and to the left but tolerates it       Numbness on the left side of the face is getting to be less       She has some tactile sensation temperature appreciation in V2       There is 2 teeth that are numb upper left side            Past medical history  Heterozygous factor V gene mutation  Sagittal sinus venous thrombosis 1994  Blood clots in 2014  Cataracts  ADD  Depression  Obstructive sleep apnea          She has the history of:  1. Sagittal venous thrombus in 1994.  2. Heterozygous factor V gene mutation.  3.  History of obstructive sleep apnea, uses the CPAP machine. Working with a new sleep person is using the CPAP machine currently might have some narcoleptic type symptomatology  4. Intermittent headaches which seem well controlled and responsive to Diamox, using a low dose of 250 mg, one to two tablets per day. Increasing dose today up to 4 times per day  5. Has had some mild cataracts in the past, followed by  ophthalmology.  6. Pre-existing ADD in the past    Past history, the patient had blood clots also back in 2014 during the early part of the year, and was seen at the [facility]. These occurred in the left arm, left leg, right arm clots, and multiple pulmonary emboli and she was placed back on her Coumadin by her primary.      Past medical history  Heterozygous factor V gene mutation  Sagittal sinus venous thrombosis 1994  Blood clots in 2014  Cataracts  ADD  Depression (She switched from Zoloft to Wellbutrin for depression treatment.)  Obstructive sleep apnea    .      Habits  Non-smoker  Rare alcoholic beverage  She has adopted children with special needs.        Family history  Paternal grandfather with stroke  Father with hypertension and high cholesterol and abdominal aneurysm  Mother with epilepsy, migraines, Alzheimer's disease and arthritis  Father had blood clots  Brother had blood clots  Sister has blood clots and does not have a factor V gene problem        Work-up  History of heterozygous factor V Leiden  7 sinus venous thrombosis 1994  MRI scan brain/MRV head December 2005  A.  Negative MRI scan of the head  B.  Improvement in the superior sagittal sinus thrombosis partially recannulized compared to 2000  EEG December 2007, mild right theta disorganization more so after hyperventilation bursts of theta no epileptiform activity  [facility] August 2009 optic fundi look good  Stop anticoagulation February 2014 developed clot in the left arm left leg and right arm and  multiple pulmonary emboli (restarted anticoagulation)  CT head 5/15/2023 see official report  1.  No acute intracranial process  CT sinuses 1/17/2024 see official report  1.  Mild paranasal sinus mucosal thickening.         No CT evidence of acute sinusitis.  2.  Opacified frontoethmoidal recesses and ostia of the right sphenoethmoidal recess.  3.  Chronic left orbital blowout fracture with extension of extraconal fat into the fracture defect, unchanged.  4.  Focal bony thinning or dehiscence of the medial lamina papyracea on the right      Laboratory review                      2/2014     1/2015   12/2017    10/2018   2/13/2021    6/17/2022      6/2023       6/2024  Na/K          138/2.9     140/2.8   141/3.8     140/2.8    139/3.9          141/4.5       141/4.4       137/4.1  Cl/CO2       111/22     112/19    112/20      109/23     111/19           110/20           108/20       105/21  BUN/Cr                                                                                                                16.1/0.78    13.6/1.10  Glu                                                                               93                  91              100             102  AST                                                                                                   18                                  19    WBC/Hgb                                                                   9.8/14.3          7.2/13.6      12.2/13.0  Plts                                                                            289,000           302,000       297,000   INR                                                                            1.7                                     1.66  TSH                                                                                                    3.23            0.49          Review of systems    Headaches but no significant visual changes see above  Blind spots seem to be stable  No  obscurations    No dysarthria  No dysphagia  No chest pain    Has had some flareup of her asthma stable today    Dry eyes due to her contacts following with optometrist    Numbness left V2  Blowout fracture May 2023 residual diplopia looking left and superiorly    Otherwise review systems negative            Exam  Blood pressure 120/88, pulse 120  HEENT normal  Lungs clear  Heart rate regular  Abdomen soft  Symmetrical pulses  No edema in the feet    Neurologic exam  Alert oriented x3  No aphasia  No neglect  Normal memory recall    Cranial nerves II through XII significant for  Mild increased blind spot bilaterally by confrontation  Optic fundi look okay  Extraocular movements relatively intact some diplopia looking to the left and superiorly from her block fracture  Asymmetric palpebral fissures right being greater than left  No nystagmus  Visual fields intact  Face symmetrical  Temperature appreciation symmetrical  Decreased light touch circular area V2 and 2 front teeth off to the left  Some itchiness in her left ear canal      Upper extremities  No drift no tremor normal finger-nose    Lower extremity strength normal    Reflexes symmetrical toes downgoing    Gait normal      Assessment/Plan     1.  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 Mcleod/ ophthalmologist following and tracking visual fields for good visual field to see if there is increased papilledema      2.  Factor V Leiden (D68.51)       Continue blood thinner warfarin INRs checked through primary       1994 [place] sinus thrombosis intracranially       2014 multifocal peripheral venous thrombosis and bilateral pulmonary emboli restarted warfarin    3.  RENETTA (Obstructive Sleep Apnea) (G47.33) Is wearing the CPAP mask at this time    She knows the risks and benefits of Diamox and the need for using this.   She stays well hydrated.   She knows about kidney stones, and this was  re-discussed today also.   It can also cause electrolyte abnormalities, but she is on  low dose at this time.    Current plan:    1. Diamox 250 mg tablet up to 3 tablets/day, usually on 2 uses a third as a rescue  2. Should follow up yearly basis sooner if there is problems  3. See ophthalmologist for formal visual fields  4. Anticoagulation is controlled by her primary for her difficulty with clots, pulmonary emboli, and her clotting disorder as above.    In the past  5. Follow-up with a sleep specialist to see if she has narcoleptic tendencies or not.    She has some ADD the Adderall probably helps with that with concentration   I do not feel that she has a progressive memory problem but if she is getting poor sleep she had ADD concentration may be difficult we had a long discussion about that she probably did not have any significant brain damage from her past clots but needs to treat her symptoms when they arise    Feels the pseudotumor is stable  Discussed past clotting disorder  Has followed with hematology in the past  Is on warfarin followed by family  Family history is positive for clots in multiple family members son without factor V problem      Refilled her Diamox  Multiple issues as above discussed  Patient will follow-up on a yearly basis    Total care time today 30 minutes  The longitudinal plan of care for the diagnosis(es)/condition(s) as documented were addressed during this visit. Due to the added complexity in care, I will continue to support Cayetano in the subsequent management and with ongoing continuity of care.        As per the visit today  Reviewed hospitalization 12/26/2023  Reviewed hospitalization 1/15/2024  Reviewed laboratory data          Again, thank you for allowing me to participate in the care of your patient.        Sincerely,        julissa Riggs MD

## 2024-11-16 NOTE — TELEPHONE ENCOUNTER
I spoke to the patient who describes the IV site on her hand as being hard and tender to touch.  She states she has had blood clots before. She states she is back on her blood thinner after surgery.   I consulted with the [employer] team who asks that she use warm compresses and reach out to her PCP.  The patient agreed with plan.   CHF

## 2025-04-19 ENCOUNTER — HEALTH MAINTENANCE LETTER (OUTPATIENT)
Age: 55
End: 2025-04-19

## 2025-05-27 ENCOUNTER — LAB REQUISITION (OUTPATIENT)
Dept: LAB | Facility: CLINIC | Age: 55
End: 2025-05-27

## 2025-05-27 DIAGNOSIS — Z13.6 ENCOUNTER FOR SCREENING FOR CARDIOVASCULAR DISORDERS: ICD-10-CM

## 2025-05-27 DIAGNOSIS — E03.9 HYPOTHYROIDISM, UNSPECIFIED: ICD-10-CM

## 2025-05-27 DIAGNOSIS — R73.09 OTHER ABNORMAL GLUCOSE: ICD-10-CM

## 2025-05-27 PROCEDURE — 84439 ASSAY OF FREE THYROXINE: CPT | Performed by: PHYSICIAN ASSISTANT

## 2025-05-27 PROCEDURE — 80053 COMPREHEN METABOLIC PANEL: CPT | Performed by: PHYSICIAN ASSISTANT

## 2025-05-27 PROCEDURE — 84443 ASSAY THYROID STIM HORMONE: CPT | Performed by: PHYSICIAN ASSISTANT

## 2025-05-27 PROCEDURE — 80061 LIPID PANEL: CPT | Performed by: PHYSICIAN ASSISTANT

## 2025-05-28 LAB
ALBUMIN SERPL BCG-MCNC: 3.8 G/DL (ref 3.5–5.2)
ALP SERPL-CCNC: 108 U/L (ref 40–150)
ALT SERPL W P-5'-P-CCNC: 15 U/L (ref 0–50)
ANION GAP SERPL CALCULATED.3IONS-SCNC: 9 MMOL/L (ref 7–15)
AST SERPL W P-5'-P-CCNC: 20 U/L (ref 0–45)
BILIRUB SERPL-MCNC: 0.3 MG/DL
BUN SERPL-MCNC: 9.1 MG/DL (ref 6–20)
CALCIUM SERPL-MCNC: 8.1 MG/DL (ref 8.8–10.4)
CHLORIDE SERPL-SCNC: 108 MMOL/L (ref 98–107)
CHOLEST SERPL-MCNC: 246 MG/DL
CREAT SERPL-MCNC: 1.19 MG/DL (ref 0.51–0.95)
EGFRCR SERPLBLD CKD-EPI 2021: 54 ML/MIN/1.73M2
FASTING STATUS PATIENT QL REPORTED: YES
FASTING STATUS PATIENT QL REPORTED: YES
GLUCOSE SERPL-MCNC: 113 MG/DL (ref 70–99)
HCO3 SERPL-SCNC: 22 MMOL/L (ref 22–29)
HDLC SERPL-MCNC: 40 MG/DL
LDLC SERPL CALC-MCNC: 180 MG/DL
NONHDLC SERPL-MCNC: 206 MG/DL
POTASSIUM SERPL-SCNC: 3.8 MMOL/L (ref 3.4–5.3)
PROT SERPL-MCNC: 6.4 G/DL (ref 6.4–8.3)
SODIUM SERPL-SCNC: 139 MMOL/L (ref 135–145)
T4 FREE SERPL-MCNC: 0.12 NG/DL (ref 0.9–1.7)
TRIGL SERPL-MCNC: 129 MG/DL
TSH SERPL DL<=0.005 MIU/L-ACNC: 145 UIU/ML (ref 0.3–4.2)

## 2025-07-12 ENCOUNTER — HEALTH MAINTENANCE LETTER (OUTPATIENT)
Age: 55
End: 2025-07-12

## 2025-07-25 ENCOUNTER — LAB REQUISITION (OUTPATIENT)
Dept: LAB | Facility: CLINIC | Age: 55
End: 2025-07-25

## 2025-07-25 DIAGNOSIS — R10.9 UNSPECIFIED ABDOMINAL PAIN: ICD-10-CM

## 2025-07-25 PROCEDURE — 87338 HPYLORI STOOL AG IA: CPT | Performed by: PHYSICIAN ASSISTANT

## 2025-07-29 LAB — H PYLORI AG STL QL IA: NEGATIVE

## (undated) DEVICE — DECANTER BAG 2002S

## (undated) DEVICE — ESU PENCIL W/HOLSTER E2350H

## (undated) DEVICE — TUBING SUCTION 12"X1/4" N612

## (undated) DEVICE — LINEN TOWEL PACK X5 5464

## (undated) DEVICE — TUBING SUCTION MEDI-VAC SOFT 3/16"X20' N520A

## (undated) DEVICE — ESU NDL COLORADO MICRO 3CM STR N103A

## (undated) DEVICE — EYE DRSG PAD OVAL

## (undated) DEVICE — SOL NACL 0.9% IRRIG 1000ML BOTTLE 2F7124

## (undated) DEVICE — TRACKER PATIENT NON-INVASIVE AXIEM 9734887XOM

## (undated) DEVICE — PACK OCULOPLATIC SEN15OCFSD

## (undated) DEVICE — SOL WATER IRRIG 1000ML BOTTLE 2F7114

## (undated) DEVICE — TRACKER ENT OTS INSTRUMENT FUSION 9733533

## (undated) DEVICE — ESU PENCIL W/SMOKE EVAC CVPLP2000

## (undated) DEVICE — ESU ELEC BLADE 2.75" COATED/INSULATED E1455

## (undated) DEVICE — GLOVE BIOGEL PI MICRO SZ 7.5 48575

## (undated) DEVICE — DECANTER VIAL 2006S

## (undated) DEVICE — EYE PREP BETADINE 5% SOLUTION 30ML 0065-0411-30

## (undated) DEVICE — ESU ELEC NDL 1" COATED/INSULATED E1465

## (undated) DEVICE — SU PLAIN 6-0 G-1DA 18" 770G

## (undated) RX ORDER — FENTANYL CITRATE 50 UG/ML
INJECTION, SOLUTION INTRAMUSCULAR; INTRAVENOUS
Status: DISPENSED
Start: 2023-07-07

## (undated) RX ORDER — ONDANSETRON 2 MG/ML
INJECTION INTRAMUSCULAR; INTRAVENOUS
Status: DISPENSED
Start: 2023-07-07

## (undated) RX ORDER — HYDROMORPHONE HYDROCHLORIDE 1 MG/ML
INJECTION, SOLUTION INTRAMUSCULAR; INTRAVENOUS; SUBCUTANEOUS
Status: DISPENSED
Start: 2023-07-07

## (undated) RX ORDER — PROPOFOL 10 MG/ML
INJECTION, EMULSION INTRAVENOUS
Status: DISPENSED
Start: 2023-07-07

## (undated) RX ORDER — DEXAMETHASONE SODIUM PHOSPHATE 4 MG/ML
INJECTION, SOLUTION INTRA-ARTICULAR; INTRALESIONAL; INTRAMUSCULAR; INTRAVENOUS; SOFT TISSUE
Status: DISPENSED
Start: 2023-07-07

## (undated) RX ORDER — OXYCODONE HYDROCHLORIDE 5 MG/1
TABLET ORAL
Status: DISPENSED
Start: 2023-07-07